# Patient Record
Sex: FEMALE | Race: WHITE | NOT HISPANIC OR LATINO | Employment: OTHER | URBAN - METROPOLITAN AREA
[De-identification: names, ages, dates, MRNs, and addresses within clinical notes are randomized per-mention and may not be internally consistent; named-entity substitution may affect disease eponyms.]

---

## 2017-02-15 RX ORDER — DULOXETIN HYDROCHLORIDE 30 MG/1
30 CAPSULE, DELAYED RELEASE ORAL DAILY
Qty: 90 CAPSULE | Refills: 2 | Status: SHIPPED | OUTPATIENT
Start: 2017-02-15 | End: 2017-08-22 | Stop reason: SDUPTHER

## 2017-02-15 RX ORDER — ISOSORBIDE MONONITRATE 30 MG/1
30 TABLET, EXTENDED RELEASE ORAL DAILY
Qty: 90 TABLET | Refills: 2 | Status: SHIPPED | OUTPATIENT
Start: 2017-02-15 | End: 2017-08-22 | Stop reason: SDUPTHER

## 2017-03-03 ENCOUNTER — OFFICE VISIT (OUTPATIENT)
Dept: INTERNAL MEDICINE | Facility: CLINIC | Age: 77
End: 2017-03-03

## 2017-03-03 ENCOUNTER — HOSPITAL ENCOUNTER (OUTPATIENT)
Dept: GENERAL RADIOLOGY | Facility: HOSPITAL | Age: 77
Discharge: HOME | End: 2017-03-03
Admitting: NURSE PRACTITIONER

## 2017-03-03 VITALS
SYSTOLIC BLOOD PRESSURE: 120 MMHG | RESPIRATION RATE: 16 BRPM | OXYGEN SATURATION: 96 % | DIASTOLIC BLOOD PRESSURE: 70 MMHG | BODY MASS INDEX: 29.87 KG/M2 | WEIGHT: 174 LBS | HEART RATE: 76 BPM | TEMPERATURE: 97.6 F

## 2017-03-03 DIAGNOSIS — M27.0 TORUS PALATINUS: ICD-10-CM

## 2017-03-03 DIAGNOSIS — M25.562 ACUTE PAIN OF LEFT KNEE: Primary | ICD-10-CM

## 2017-03-03 PROCEDURE — 99213 OFFICE O/P EST LOW 20 MIN: CPT | Performed by: NURSE PRACTITIONER

## 2017-03-03 PROCEDURE — 73562 X-RAY EXAM OF KNEE 3: CPT

## 2017-03-03 NOTE — PROGRESS NOTES
Vitals:    03/03/17 0959   BP: 120/70   Pulse: 76   Resp: 16   Temp: 97.6 °F (36.4 °C)   SpO2: 96%     Last 2 weights    03/03/17  0959   Weight: 174 lb (78.9 kg)     Social History   Substance Use Topics   • Smoking status: Former Smoker   • Smokeless tobacco: Not on file   • Alcohol use No       Subjective     HPI  Pt presents to office today with left knee pain for the past few weeks. She denies any trauma or acute injury. She states she just noticed it starting to hurt, especially when she exercised on her stationary bike. Some day she has to limp but is still able to bear weight on it. Pain is primarily located right under the knee cap. She has not been taking anything for pain relief at this time.     Also pts roof of mouth has been hurting for the past couple of days. She is unsure what happened. She does see a dentist regularly.     The following portions of the patient's history were reviewed and updated as appropriate: allergies, current medications, past medical history, past social history and problem list.    Review of Systems   Constitutional: Negative.    HENT:        Roof of mouth soreness   Respiratory: Negative.    Cardiovascular: Negative.    Musculoskeletal:        Left knee pain         Objective     Physical Exam   Constitutional: She is oriented to person, place, and time. She appears well-developed and well-nourished.   HENT:   Head: Normocephalic and atraumatic.   Torus palatinus noted that is slightly inflamed. No signs of infections noted   Neck: Normal range of motion.   Cardiovascular: Normal rate, regular rhythm and normal heart sounds.    Pulmonary/Chest: Effort normal and breath sounds normal.   Musculoskeletal: Normal range of motion.        Left knee: Tenderness found.        Legs:  Full extension pt can feel pain below patella. When palpating pain is reproducible as well. No redness, swelling noted.   Neurological: She is alert and oriented to person, place, and time.   Nursing  note and vitals reviewed.      Assessment/Plan   Navi was seen today for knee pain and oral pain.    Diagnoses and all orders for this visit:    Acute pain of left knee  -     XR Knee 3 View Left    Torus palatinus           -x ray left knee  -likely pt ate something hot or something sharp his torus palatinus. If pt notices foul odor, increase in size, or abnormal taste in mouth she is to go to her dentist. In the meantime salt water gargles  -ibuprofen, rest, knee sleeve, avoid heavy lifting and strain on knee.  -cont home meds  -FU prn or if symptoms persist/worsen

## 2017-04-20 RX ORDER — ATORVASTATIN CALCIUM 10 MG/1
10 TABLET, FILM COATED ORAL DAILY
Qty: 30 TABLET | Refills: 6 | Status: SHIPPED | OUTPATIENT
Start: 2017-04-20 | End: 2017-10-26 | Stop reason: SDUPTHER

## 2017-04-20 RX ORDER — AMLODIPINE BESYLATE AND BENAZEPRIL HYDROCHLORIDE 2.5; 1 MG/1; MG/1
1 CAPSULE ORAL DAILY
Qty: 30 CAPSULE | Refills: 6 | Status: SHIPPED | OUTPATIENT
Start: 2017-04-20 | End: 2017-10-26 | Stop reason: SDUPTHER

## 2017-05-04 ENCOUNTER — OFFICE VISIT (OUTPATIENT)
Dept: INTERNAL MEDICINE | Facility: CLINIC | Age: 77
End: 2017-05-04

## 2017-05-04 VITALS
TEMPERATURE: 98.2 F | WEIGHT: 182 LBS | SYSTOLIC BLOOD PRESSURE: 156 MMHG | DIASTOLIC BLOOD PRESSURE: 79 MMHG | BODY MASS INDEX: 31.07 KG/M2 | HEIGHT: 64 IN | RESPIRATION RATE: 16 BRPM | OXYGEN SATURATION: 92 % | HEART RATE: 78 BPM

## 2017-05-04 DIAGNOSIS — I10 ESSENTIAL HYPERTENSION: ICD-10-CM

## 2017-05-04 DIAGNOSIS — E11.9 NON-INSULIN DEPENDENT TYPE 2 DIABETES MELLITUS (HCC): ICD-10-CM

## 2017-05-04 DIAGNOSIS — E78.49 OTHER HYPERLIPIDEMIA: ICD-10-CM

## 2017-05-04 DIAGNOSIS — Z00.00 MEDICARE ANNUAL WELLNESS VISIT, INITIAL: Primary | ICD-10-CM

## 2017-05-04 DIAGNOSIS — M17.12 PRIMARY OSTEOARTHRITIS OF LEFT KNEE: ICD-10-CM

## 2017-05-04 DIAGNOSIS — I25.10 ATHEROSCLEROSIS OF NATIVE CORONARY ARTERY OF NATIVE HEART WITHOUT ANGINA PECTORIS: ICD-10-CM

## 2017-05-04 PROBLEM — M17.9 OSTEOARTHRITIS OF KNEE: Status: ACTIVE | Noted: 2017-05-04

## 2017-05-04 PROCEDURE — G0438 PPPS, INITIAL VISIT: HCPCS | Performed by: INTERNAL MEDICINE

## 2017-05-04 PROCEDURE — 96160 PT-FOCUSED HLTH RISK ASSMT: CPT | Performed by: INTERNAL MEDICINE

## 2017-05-04 PROCEDURE — 99214 OFFICE O/P EST MOD 30 MIN: CPT | Performed by: INTERNAL MEDICINE

## 2017-05-04 RX ORDER — LISINOPRIL 10 MG/1
10 TABLET ORAL DAILY
Qty: 90 TABLET | Refills: 3 | Status: SHIPPED | OUTPATIENT
Start: 2017-05-04 | End: 2018-01-15

## 2017-05-05 LAB
ALBUMIN SERPL-MCNC: 4.2 G/DL (ref 3.5–5.2)
ALBUMIN/GLOB SERPL: 1.5 G/DL
ALP SERPL-CCNC: 117 U/L (ref 39–117)
ALT SERPL-CCNC: 19 U/L (ref 1–33)
APPEARANCE UR: CLEAR
AST SERPL-CCNC: 18 U/L (ref 1–32)
BACTERIA #/AREA URNS HPF: ABNORMAL /HPF
BASOPHILS # BLD AUTO: 0.07 10*3/MM3 (ref 0–0.2)
BASOPHILS NFR BLD AUTO: 0.7 % (ref 0–1.5)
BILIRUB SERPL-MCNC: 0.3 MG/DL (ref 0.1–1.2)
BILIRUB UR QL STRIP: NEGATIVE
BUN SERPL-MCNC: 22 MG/DL (ref 8–23)
BUN/CREAT SERPL: 19.8 (ref 7–25)
CALCIUM SERPL-MCNC: 10.2 MG/DL (ref 8.6–10.5)
CASTS URNS MICRO: ABNORMAL
CHLORIDE SERPL-SCNC: 96 MMOL/L (ref 98–107)
CO2 SERPL-SCNC: 26.3 MMOL/L (ref 22–29)
COLOR UR: YELLOW
CREAT SERPL-MCNC: 1.11 MG/DL (ref 0.57–1)
EOSINOPHIL # BLD AUTO: 0.23 10*3/MM3 (ref 0–0.7)
EOSINOPHIL NFR BLD AUTO: 2.2 % (ref 0.3–6.2)
EPI CELLS #/AREA URNS HPF: ABNORMAL /HPF
ERYTHROCYTE [DISTWIDTH] IN BLOOD BY AUTOMATED COUNT: 13.2 % (ref 11.7–13)
GLOBULIN SER CALC-MCNC: 2.8 GM/DL
GLUCOSE SERPL-MCNC: 103 MG/DL (ref 65–99)
GLUCOSE UR QL: NEGATIVE
HBA1C MFR BLD: 5.94 % (ref 4.8–5.6)
HCT VFR BLD AUTO: 42.8 % (ref 35.6–45.5)
HGB BLD-MCNC: 14.3 G/DL (ref 11.9–15.5)
HGB UR QL STRIP: NEGATIVE
IMM GRANULOCYTES # BLD: 0.03 10*3/MM3 (ref 0–0.03)
IMM GRANULOCYTES NFR BLD: 0.3 % (ref 0–0.5)
KETONES UR QL STRIP: NEGATIVE
LEUKOCYTE ESTERASE UR QL STRIP: (no result)
LYMPHOCYTES # BLD AUTO: 2.87 10*3/MM3 (ref 0.9–4.8)
LYMPHOCYTES NFR BLD AUTO: 27.2 % (ref 19.6–45.3)
MCH RBC QN AUTO: 31.5 PG (ref 26.9–32)
MCHC RBC AUTO-ENTMCNC: 33.4 G/DL (ref 32.4–36.3)
MCV RBC AUTO: 94.3 FL (ref 80.5–98.2)
MICROALBUMIN UR-MCNC: 176.5 UG/ML
MONOCYTES # BLD AUTO: 0.64 10*3/MM3 (ref 0.2–1.2)
MONOCYTES NFR BLD AUTO: 6.1 % (ref 5–12)
NEUTROPHILS # BLD AUTO: 6.72 10*3/MM3 (ref 1.9–8.1)
NEUTROPHILS NFR BLD AUTO: 63.5 % (ref 42.7–76)
NITRITE UR QL STRIP: NEGATIVE
PH UR STRIP: 5.5 [PH] (ref 5–8)
PLATELET # BLD AUTO: 271 10*3/MM3 (ref 140–500)
POTASSIUM SERPL-SCNC: 4.1 MMOL/L (ref 3.5–5.2)
PROT SERPL-MCNC: 7 G/DL (ref 6–8.5)
PROT UR QL STRIP: (no result)
RBC # BLD AUTO: 4.54 10*6/MM3 (ref 3.9–5.2)
RBC #/AREA URNS HPF: ABNORMAL /HPF
SODIUM SERPL-SCNC: 139 MMOL/L (ref 136–145)
SP GR UR: 1.03 (ref 1–1.03)
T4 FREE SERPL-MCNC: 1.03 NG/DL (ref 0.93–1.7)
TSH SERPL DL<=0.005 MIU/L-ACNC: 0.77 MIU/ML (ref 0.27–4.2)
UROBILINOGEN UR STRIP-MCNC: (no result) MG/DL
WBC # BLD AUTO: 10.56 10*3/MM3 (ref 4.5–10.7)
WBC #/AREA URNS HPF: ABNORMAL /HPF

## 2017-05-09 RX ORDER — METOPROLOL SUCCINATE 50 MG/1
50 TABLET, EXTENDED RELEASE ORAL DAILY
Qty: 30 TABLET | Refills: 6 | Status: SHIPPED | OUTPATIENT
Start: 2017-05-09 | End: 2017-10-13 | Stop reason: SDUPTHER

## 2017-05-23 ENCOUNTER — OFFICE VISIT (OUTPATIENT)
Dept: INTERNAL MEDICINE | Facility: CLINIC | Age: 77
End: 2017-05-23

## 2017-05-23 VITALS
HEIGHT: 64 IN | RESPIRATION RATE: 16 BRPM | TEMPERATURE: 98 F | DIASTOLIC BLOOD PRESSURE: 90 MMHG | SYSTOLIC BLOOD PRESSURE: 130 MMHG | BODY MASS INDEX: 29.71 KG/M2 | HEART RATE: 66 BPM | OXYGEN SATURATION: 90 % | WEIGHT: 174 LBS

## 2017-05-23 DIAGNOSIS — I10 ESSENTIAL HYPERTENSION: Primary | ICD-10-CM

## 2017-05-23 PROCEDURE — 99213 OFFICE O/P EST LOW 20 MIN: CPT | Performed by: NURSE PRACTITIONER

## 2017-05-23 RX ORDER — CHLORTHALIDONE 25 MG/1
25 TABLET ORAL DAILY
Qty: 30 TABLET | Refills: 2 | Status: SHIPPED | OUTPATIENT
Start: 2017-05-23 | End: 2017-08-15 | Stop reason: SDUPTHER

## 2017-05-23 RX ORDER — CHLORTHALIDONE 25 MG/1
25 TABLET ORAL DAILY
COMMUNITY
End: 2017-05-23 | Stop reason: SDUPTHER

## 2017-06-02 ENCOUNTER — OFFICE VISIT (OUTPATIENT)
Dept: ORTHOPEDIC SURGERY | Facility: CLINIC | Age: 77
End: 2017-06-02

## 2017-06-02 VITALS — HEIGHT: 64 IN | TEMPERATURE: 99.6 F | WEIGHT: 175 LBS | BODY MASS INDEX: 29.88 KG/M2

## 2017-06-02 DIAGNOSIS — M25.562 ACUTE PAIN OF LEFT KNEE: Primary | ICD-10-CM

## 2017-06-02 PROCEDURE — 20610 DRAIN/INJ JOINT/BURSA W/O US: CPT | Performed by: NURSE PRACTITIONER

## 2017-06-02 PROCEDURE — 99213 OFFICE O/P EST LOW 20 MIN: CPT | Performed by: NURSE PRACTITIONER

## 2017-06-02 RX ORDER — BUPIVACAINE HYDROCHLORIDE 2.5 MG/ML
2 INJECTION, SOLUTION INFILTRATION; PERINEURAL
Status: COMPLETED | OUTPATIENT
Start: 2017-06-02 | End: 2017-06-02

## 2017-06-02 RX ORDER — LIDOCAINE HYDROCHLORIDE 10 MG/ML
4 INJECTION, SOLUTION INFILTRATION; PERINEURAL
Status: COMPLETED | OUTPATIENT
Start: 2017-06-02 | End: 2017-06-02

## 2017-06-02 RX ORDER — METHYLPREDNISOLONE ACETATE 80 MG/ML
80 INJECTION, SUSPENSION INTRA-ARTICULAR; INTRALESIONAL; INTRAMUSCULAR; SOFT TISSUE
Status: COMPLETED | OUTPATIENT
Start: 2017-06-02 | End: 2017-06-02

## 2017-06-02 RX ADMIN — BUPIVACAINE HYDROCHLORIDE 2 ML: 2.5 INJECTION, SOLUTION INFILTRATION; PERINEURAL at 14:11

## 2017-06-02 RX ADMIN — LIDOCAINE HYDROCHLORIDE 4 ML: 10 INJECTION, SOLUTION INFILTRATION; PERINEURAL at 14:11

## 2017-06-02 RX ADMIN — METHYLPREDNISOLONE ACETATE 80 MG: 80 INJECTION, SUSPENSION INTRA-ARTICULAR; INTRALESIONAL; INTRAMUSCULAR; SOFT TISSUE at 14:11

## 2017-06-02 NOTE — PROGRESS NOTES
Patient: Navi Kinney  YOB: 1940 76 y.o. female  Medical Record Number: [id number]    Chief Complaints:   Chief Complaint   Patient presents with   • Left Knee - Pain       History of Present Illness:Navi Kinney is a 76 y.o. female who presents With complaints of left knee pain.  Patient reports that it started about 3 weeks ago.  She denies any specific injury.  She describes the pain as a moderate ache with swelling, worse with sitting standing, slightly better with rest.  She saw her primary care physician who sent her for x-rays and then referred her here.    Allergies: No Known Allergies    Medications:   Current Outpatient Prescriptions   Medication Sig Dispense Refill   • amLODIPine-benazepril (LOTREL 2.5-10) 2.5-10 MG per capsule Take 1 capsule by mouth Daily. 30 capsule 6   • aspirin 81 MG tablet Take  by mouth.     • Cyanocobalamin (VITAMIN B-12 PO) Take  by mouth.     • DULoxetine (CYMBALTA) 30 MG capsule Take 1 capsule by mouth Daily. 90 capsule 2   • isosorbide mononitrate (IMDUR) 30 MG 24 hr tablet Take 1 tablet by mouth Daily. 90 tablet 2   • lisinopril (PRINIVIL,ZESTRIL) 10 MG tablet Take 1 tablet by mouth Daily. 90 tablet 3   • metoprolol succinate XL (TOPROL-XL) 50 MG 24 hr tablet Take 1 tablet by mouth Daily. 30 tablet 6   • nitroglycerin (NITROSTAT) 0.4 MG SL tablet Place  under the tongue.     • atorvastatin (LIPITOR) 10 MG tablet Take 1 tablet by mouth Daily. 30 tablet 6   • carvedilol (COREG) 6.25 MG tablet Take  by mouth daily.     • chlorthalidone (HYGROTON) 25 MG tablet Take 1 tablet by mouth Daily. 30 tablet 2   • pioglitazone (ACTOS) 15 MG tablet Take  by mouth.       No current facility-administered medications for this visit.          The following portions of the patient's history were reviewed and updated as appropriate: allergies, current medications, past family history, past medical history, past social history, past surgical history and problem list.    Review  "of Systems:   A 14 point review of systems was performed. All systems negative except pertinent positives/negative listed in HPI above    Physical Exam:   Vitals:    06/02/17 1321   Temp: 99.6 °F (37.6 °C)   Weight: 175 lb (79.4 kg)   Height: 64\" (162.6 cm)       General: A and O x 3, ASA, NAD    SCLERA:    Normal    DENTITION:   Normal  Skin clear no unusual lesions noted  Left knee patient has trace amount of effusion noted with 115° flexion neutral in extension with a positive Jada negative Lockman calf is soft and nontender    Radiology:  Xrays previous x-rays were reviewed and show moderate arthritic changes.    Assessment/Plan:  Osteoarthritis left knee    We will proceed with left knee aspiration then injection, refer the patient to outpatient physical therapy, and we'll otherwise see her back as needed    Large Joint Arthrocentesis  Date/Time: 6/2/2017 2:11 PM  Consent given by: patient  Site marked: site marked  Timeout: Immediately prior to procedure a time out was called to verify the correct patient, procedure, equipment, support staff and site/side marked as required   Supporting Documentation  Indications: pain and joint swelling   Procedure Details  Location: knee - L knee  Preparation: Patient was prepped and draped in the usual sterile fashion  Needle size: 18 G  Approach: anterolateral  Medications administered: 4 mL lidocaine 1 %; 80 mg methylPREDNISolone acetate 80 MG/ML; 2 mL bupivacaine  Aspirate amount: 12 mL  Aspirate: clear          "

## 2017-08-15 RX ORDER — CHLORTHALIDONE 25 MG/1
25 TABLET ORAL DAILY
Qty: 30 TABLET | Refills: 2 | Status: SHIPPED | OUTPATIENT
Start: 2017-08-15 | End: 2017-11-27 | Stop reason: SDUPTHER

## 2017-08-22 RX ORDER — ISOSORBIDE MONONITRATE 30 MG/1
TABLET, EXTENDED RELEASE ORAL
Qty: 90 TABLET | Refills: 0 | Status: SHIPPED | OUTPATIENT
Start: 2017-08-22 | End: 2018-01-15 | Stop reason: SDUPTHER

## 2017-08-22 RX ORDER — DULOXETIN HYDROCHLORIDE 30 MG/1
CAPSULE, DELAYED RELEASE ORAL
Qty: 90 CAPSULE | Refills: 0 | Status: SHIPPED | OUTPATIENT
Start: 2017-08-22 | End: 2018-01-15 | Stop reason: SDUPTHER

## 2017-08-31 ENCOUNTER — OFFICE VISIT (OUTPATIENT)
Dept: INTERNAL MEDICINE | Facility: CLINIC | Age: 77
End: 2017-08-31

## 2017-08-31 VITALS
HEIGHT: 64 IN | DIASTOLIC BLOOD PRESSURE: 68 MMHG | BODY MASS INDEX: 30.05 KG/M2 | TEMPERATURE: 98.5 F | SYSTOLIC BLOOD PRESSURE: 110 MMHG | OXYGEN SATURATION: 92 % | HEART RATE: 78 BPM | WEIGHT: 176 LBS | RESPIRATION RATE: 16 BRPM

## 2017-08-31 DIAGNOSIS — R10.33 PERIUMBILICAL ABDOMINAL PAIN: ICD-10-CM

## 2017-08-31 DIAGNOSIS — R10.32 LEFT LOWER QUADRANT PAIN: Primary | ICD-10-CM

## 2017-08-31 PROCEDURE — 99213 OFFICE O/P EST LOW 20 MIN: CPT | Performed by: NURSE PRACTITIONER

## 2017-09-02 LAB
ALBUMIN SERPL-MCNC: 4.1 G/DL (ref 3.5–5.2)
ALBUMIN/GLOB SERPL: 1.6 G/DL
ALP SERPL-CCNC: 87 U/L (ref 39–117)
ALT SERPL-CCNC: 14 U/L (ref 1–33)
APPEARANCE UR: ABNORMAL
AST SERPL-CCNC: 12 U/L (ref 1–32)
BACTERIA #/AREA URNS HPF: ABNORMAL /HPF
BACTERIA UR CULT: NORMAL
BACTERIA UR CULT: NORMAL
BASOPHILS # BLD AUTO: 0.05 10*3/MM3 (ref 0–0.2)
BASOPHILS NFR BLD AUTO: 0.5 % (ref 0–1.5)
BILIRUB SERPL-MCNC: 0.6 MG/DL (ref 0.1–1.2)
BILIRUB UR QL STRIP: NEGATIVE
BUN SERPL-MCNC: 24 MG/DL (ref 8–23)
BUN/CREAT SERPL: 19.5 (ref 7–25)
CALCIUM SERPL-MCNC: 9.7 MG/DL (ref 8.6–10.5)
CASTS URNS MICRO: ABNORMAL
CASTS URNS QL MICRO: PRESENT /LPF
CHLORIDE SERPL-SCNC: 99 MMOL/L (ref 98–107)
CO2 SERPL-SCNC: 28.6 MMOL/L (ref 22–29)
COLOR UR: YELLOW
CREAT SERPL-MCNC: 1.23 MG/DL (ref 0.57–1)
EOSINOPHIL # BLD AUTO: 0.15 10*3/MM3 (ref 0–0.7)
EOSINOPHIL NFR BLD AUTO: 1.5 % (ref 0.3–6.2)
EPI CELLS #/AREA URNS HPF: >10 /HPF
ERYTHROCYTE [DISTWIDTH] IN BLOOD BY AUTOMATED COUNT: 13.3 % (ref 11.7–13)
GLOBULIN SER CALC-MCNC: 2.6 GM/DL
GLUCOSE SERPL-MCNC: 120 MG/DL (ref 65–99)
GLUCOSE UR QL: NEGATIVE
HCT VFR BLD AUTO: 40.5 % (ref 35.6–45.5)
HGB BLD-MCNC: 13.4 G/DL (ref 11.9–15.5)
HGB UR QL STRIP: NEGATIVE
IMM GRANULOCYTES # BLD: 0.05 10*3/MM3 (ref 0–0.03)
IMM GRANULOCYTES NFR BLD: 0.5 % (ref 0–0.5)
KETONES UR QL STRIP: NEGATIVE
LEUKOCYTE ESTERASE UR QL STRIP: ABNORMAL
LYMPHOCYTES # BLD AUTO: 2.02 10*3/MM3 (ref 0.9–4.8)
LYMPHOCYTES NFR BLD AUTO: 20.4 % (ref 19.6–45.3)
MCH RBC QN AUTO: 31.5 PG (ref 26.9–32)
MCHC RBC AUTO-ENTMCNC: 33.1 G/DL (ref 32.4–36.3)
MCV RBC AUTO: 95.3 FL (ref 80.5–98.2)
MICRO URNS: ABNORMAL
MONOCYTES # BLD AUTO: 0.77 10*3/MM3 (ref 0.2–1.2)
MONOCYTES NFR BLD AUTO: 7.8 % (ref 5–12)
MUCOUS THREADS URNS QL MICRO: PRESENT /HPF
NEUTROPHILS # BLD AUTO: 6.85 10*3/MM3 (ref 1.9–8.1)
NEUTROPHILS NFR BLD AUTO: 69.3 % (ref 42.7–76)
NITRITE UR QL STRIP: NEGATIVE
PH UR STRIP: 6 [PH] (ref 5–7.5)
PLATELET # BLD AUTO: 277 10*3/MM3 (ref 140–500)
POTASSIUM SERPL-SCNC: 4.1 MMOL/L (ref 3.5–5.2)
PROT SERPL-MCNC: 6.7 G/DL (ref 6–8.5)
PROT UR QL STRIP: NEGATIVE
RBC # BLD AUTO: 4.25 10*6/MM3 (ref 3.9–5.2)
RBC #/AREA URNS HPF: ABNORMAL /HPF
SODIUM SERPL-SCNC: 142 MMOL/L (ref 136–145)
SP GR UR: 1.02 (ref 1–1.03)
URINALYSIS REFLEX: ABNORMAL
UROBILINOGEN UR STRIP-MCNC: 0.2 MG/DL (ref 0.2–1)
WBC # BLD AUTO: 9.89 10*3/MM3 (ref 4.5–10.7)
WBC #/AREA URNS HPF: ABNORMAL /HPF

## 2017-10-13 ENCOUNTER — TRANSCRIBE ORDERS (OUTPATIENT)
Dept: ADMINISTRATIVE | Facility: HOSPITAL | Age: 77
End: 2017-10-13

## 2017-10-13 ENCOUNTER — OFFICE VISIT (OUTPATIENT)
Dept: INTERNAL MEDICINE | Facility: CLINIC | Age: 77
End: 2017-10-13

## 2017-10-13 VITALS
BODY MASS INDEX: 29.37 KG/M2 | HEIGHT: 64 IN | OXYGEN SATURATION: 95 % | DIASTOLIC BLOOD PRESSURE: 68 MMHG | RESPIRATION RATE: 16 BRPM | SYSTOLIC BLOOD PRESSURE: 116 MMHG | TEMPERATURE: 98 F | WEIGHT: 172 LBS | HEART RATE: 92 BPM

## 2017-10-13 DIAGNOSIS — Z12.31 VISIT FOR SCREENING MAMMOGRAM: Primary | ICD-10-CM

## 2017-10-13 DIAGNOSIS — K52.9 CHRONIC DIARRHEA: Primary | ICD-10-CM

## 2017-10-13 DIAGNOSIS — F43.9 STRESS AT HOME: ICD-10-CM

## 2017-10-13 PROCEDURE — 99214 OFFICE O/P EST MOD 30 MIN: CPT | Performed by: NURSE PRACTITIONER

## 2017-10-13 NOTE — PROGRESS NOTES
Vitals:    10/13/17 1232   BP: 116/68   Pulse: 92   Resp: 16   Temp: 98 °F (36.7 °C)   SpO2: 95%     Last 2 weights    10/13/17  1232   Weight: 172 lb (78 kg)     Social History   Substance Use Topics   • Smoking status: Former Smoker   • Smokeless tobacco: Not on file      Comment: quit 28 yrs ago   • Alcohol use No       Subjective     HPI  Pt presents to office today  With chronic diarrhea. After having baby 30 years starting having diarrhea that persisted, went to specialist which they didn't find anything. About 5 years after start of symptoms it stopped for years, and flared up again. Pt suffered through it as she didn't think anything could be done and recently the past 6 months it came back again. She has no control over it and often soils self. immodium works for her but she didn't know if it safe to take long term. Pt does no have gallbladder and had this removed 10 years ago. Past colonoscopy was normal per pt. Unable to find report. She would like to know if there is some she may take every day of if there is any testing that can be done. Denies weight loss, abdominal cramping, fever, night sweats. Denies recent travel or abnormal foods.    The following portions of the patient's history were reviewed and updated as appropriate: allergies, current medications, past medical history, past social history and problem list.    Review of Systems   Constitutional: Negative.    Respiratory: Negative.    Cardiovascular: Negative.    Gastrointestinal: Positive for diarrhea.       Objective     Physical Exam   Constitutional: She is oriented to person, place, and time. Vital signs are normal. She appears well-developed and well-nourished.   HENT:   Head: Normocephalic and atraumatic.   Neck: Normal range of motion.   Cardiovascular: Normal rate, regular rhythm and normal heart sounds.    Pulmonary/Chest: Effort normal and breath sounds normal.   Abdominal: Soft. Normal appearance. Bowel sounds are increased. There  is no tenderness.   Musculoskeletal: Normal range of motion.   Neurological: She is oriented to person, place, and time.   Nursing note and vitals reviewed.      Assessment/Plan   Navi was seen today for diarrhea.    Diagnoses and all orders for this visit:    Chronic diarrhea  -     CBC & Differential  -     Comprehensive Metabolic Panel  -     Amylase  -     Lipase  -     Stool Culture - Stool, Per Rectum  -     Clostridium Difficile EIA - Stool, Per Rectum    Stress at home  -     CBC & Differential  -     Comprehensive Metabolic Panel  -     Amylase  -     Lipase  -     Stool Culture - Stool, Per Rectum  -     Clostridium Difficile EIA - Stool, Per Rectum         -lab work today  -gave pt names of viberzi and humberto to see what issuance would cover.  -cont home meds  -FU in 2 weeks  -also advise pt to try metamucil, increase fiber, and probiotic  -spent more than 30 min in room with pt discussing hpi/pe/plan of care

## 2017-10-14 LAB
ALBUMIN SERPL-MCNC: 4.3 G/DL (ref 3.5–5.2)
ALBUMIN/GLOB SERPL: 1.7 G/DL
ALP SERPL-CCNC: 95 U/L (ref 39–117)
ALT SERPL-CCNC: 18 U/L (ref 1–33)
AMYLASE SERPL-CCNC: 76 U/L (ref 28–100)
AST SERPL-CCNC: 14 U/L (ref 1–32)
BASOPHILS # BLD AUTO: 0.06 10*3/MM3 (ref 0–0.2)
BASOPHILS NFR BLD AUTO: 0.7 % (ref 0–1.5)
BILIRUB SERPL-MCNC: 0.4 MG/DL (ref 0.1–1.2)
BUN SERPL-MCNC: 23 MG/DL (ref 8–23)
BUN/CREAT SERPL: 19.2 (ref 7–25)
CALCIUM SERPL-MCNC: 10.1 MG/DL (ref 8.6–10.5)
CHLORIDE SERPL-SCNC: 101 MMOL/L (ref 98–107)
CO2 SERPL-SCNC: 29.5 MMOL/L (ref 22–29)
CREAT SERPL-MCNC: 1.2 MG/DL (ref 0.57–1)
EOSINOPHIL # BLD AUTO: 0.11 10*3/MM3 (ref 0–0.7)
EOSINOPHIL NFR BLD AUTO: 1.3 % (ref 0.3–6.2)
ERYTHROCYTE [DISTWIDTH] IN BLOOD BY AUTOMATED COUNT: 12.9 % (ref 11.7–13)
GLOBULIN SER CALC-MCNC: 2.5 GM/DL
GLUCOSE SERPL-MCNC: 192 MG/DL (ref 65–99)
HCT VFR BLD AUTO: 43 % (ref 35.6–45.5)
HGB BLD-MCNC: 13.8 G/DL (ref 11.9–15.5)
IMM GRANULOCYTES # BLD: 0 10*3/MM3 (ref 0–0.03)
IMM GRANULOCYTES NFR BLD: 0 % (ref 0–0.5)
LIPASE SERPL-CCNC: 39 U/L (ref 13–60)
LYMPHOCYTES # BLD AUTO: 1.7 10*3/MM3 (ref 0.9–4.8)
LYMPHOCYTES NFR BLD AUTO: 19.6 % (ref 19.6–45.3)
MCH RBC QN AUTO: 31.6 PG (ref 26.9–32)
MCHC RBC AUTO-ENTMCNC: 32.1 G/DL (ref 32.4–36.3)
MCV RBC AUTO: 98.4 FL (ref 80.5–98.2)
MONOCYTES # BLD AUTO: 0.72 10*3/MM3 (ref 0.2–1.2)
MONOCYTES NFR BLD AUTO: 8.3 % (ref 5–12)
NEUTROPHILS # BLD AUTO: 6.08 10*3/MM3 (ref 1.9–8.1)
NEUTROPHILS NFR BLD AUTO: 70.1 % (ref 42.7–76)
PLATELET # BLD AUTO: 271 10*3/MM3 (ref 140–500)
POTASSIUM SERPL-SCNC: 4 MMOL/L (ref 3.5–5.2)
PROT SERPL-MCNC: 6.8 G/DL (ref 6–8.5)
RBC # BLD AUTO: 4.37 10*6/MM3 (ref 3.9–5.2)
SODIUM SERPL-SCNC: 142 MMOL/L (ref 136–145)
WBC # BLD AUTO: 8.67 10*3/MM3 (ref 4.5–10.7)

## 2017-10-16 RX ORDER — METOPROLOL SUCCINATE 50 MG/1
TABLET, EXTENDED RELEASE ORAL
Qty: 30 TABLET | Refills: 6 | Status: SHIPPED | OUTPATIENT
Start: 2017-10-16 | End: 2018-01-15 | Stop reason: SDUPTHER

## 2017-10-22 LAB
BACTERIA SPEC CULT: NORMAL
BACTERIA SPEC CULT: NORMAL
C DIFF TOX A+B STL QL IA: NEGATIVE
CAMPYLOBACTER STL CULT: NORMAL
E COLI SXT STL QL IA: NEGATIVE
SALM + SHIG STL CULT: NORMAL

## 2017-10-26 DIAGNOSIS — K52.9 CHRONIC DIARRHEA: Primary | ICD-10-CM

## 2017-10-26 DIAGNOSIS — K58.0 IRRITABLE BOWEL SYNDROME WITH DIARRHEA: ICD-10-CM

## 2017-10-26 RX ORDER — AMLODIPINE BESYLATE AND BENAZEPRIL HYDROCHLORIDE 2.5; 1 MG/1; MG/1
CAPSULE ORAL
Qty: 30 CAPSULE | Refills: 5 | Status: SHIPPED | OUTPATIENT
Start: 2017-10-26 | End: 2018-01-15 | Stop reason: SDUPTHER

## 2017-10-26 RX ORDER — ATORVASTATIN CALCIUM 10 MG/1
TABLET, FILM COATED ORAL
Qty: 30 TABLET | Refills: 5 | Status: SHIPPED | OUTPATIENT
Start: 2017-10-26 | End: 2018-01-15 | Stop reason: SDUPTHER

## 2017-11-07 ENCOUNTER — OFFICE VISIT (OUTPATIENT)
Dept: GASTROENTEROLOGY | Facility: CLINIC | Age: 77
End: 2017-11-07

## 2017-11-07 ENCOUNTER — OFFICE VISIT (OUTPATIENT)
Dept: INTERNAL MEDICINE | Facility: CLINIC | Age: 77
End: 2017-11-07

## 2017-11-07 ENCOUNTER — HOSPITAL ENCOUNTER (OUTPATIENT)
Dept: MAMMOGRAPHY | Facility: HOSPITAL | Age: 77
Discharge: HOME | End: 2017-11-07
Attending: INTERNAL MEDICINE | Admitting: INTERNAL MEDICINE

## 2017-11-07 VITALS
TEMPERATURE: 98.1 F | WEIGHT: 176.6 LBS | HEIGHT: 64 IN | SYSTOLIC BLOOD PRESSURE: 106 MMHG | DIASTOLIC BLOOD PRESSURE: 78 MMHG | BODY MASS INDEX: 30.15 KG/M2

## 2017-11-07 VITALS
TEMPERATURE: 98.4 F | DIASTOLIC BLOOD PRESSURE: 75 MMHG | HEART RATE: 81 BPM | WEIGHT: 176.8 LBS | HEIGHT: 64 IN | BODY MASS INDEX: 30.19 KG/M2 | SYSTOLIC BLOOD PRESSURE: 119 MMHG | OXYGEN SATURATION: 94 %

## 2017-11-07 DIAGNOSIS — E78.49 OTHER HYPERLIPIDEMIA: ICD-10-CM

## 2017-11-07 DIAGNOSIS — E11.9 NON-INSULIN DEPENDENT TYPE 2 DIABETES MELLITUS (HCC): ICD-10-CM

## 2017-11-07 DIAGNOSIS — I10 ESSENTIAL HYPERTENSION: Primary | ICD-10-CM

## 2017-11-07 DIAGNOSIS — K58.0 IRRITABLE BOWEL SYNDROME WITH DIARRHEA: Primary | ICD-10-CM

## 2017-11-07 DIAGNOSIS — N18.30 CHRONIC KIDNEY DISEASE, STAGE III (MODERATE) (HCC): ICD-10-CM

## 2017-11-07 DIAGNOSIS — K43.2 INCISIONAL HERNIA, WITHOUT OBSTRUCTION OR GANGRENE: ICD-10-CM

## 2017-11-07 DIAGNOSIS — F32.89 OTHER DEPRESSION: ICD-10-CM

## 2017-11-07 DIAGNOSIS — Z12.31 VISIT FOR SCREENING MAMMOGRAM: ICD-10-CM

## 2017-11-07 DIAGNOSIS — I25.10 ATHEROSCLEROSIS OF NATIVE CORONARY ARTERY OF NATIVE HEART WITHOUT ANGINA PECTORIS: ICD-10-CM

## 2017-11-07 PROCEDURE — G0202 SCR MAMMO BI INCL CAD: HCPCS

## 2017-11-07 PROCEDURE — 77063 BREAST TOMOSYNTHESIS BI: CPT

## 2017-11-07 PROCEDURE — G0008 ADMIN INFLUENZA VIRUS VAC: HCPCS | Performed by: INTERNAL MEDICINE

## 2017-11-07 PROCEDURE — 99214 OFFICE O/P EST MOD 30 MIN: CPT | Performed by: INTERNAL MEDICINE

## 2017-11-07 PROCEDURE — 90662 IIV NO PRSV INCREASED AG IM: CPT | Performed by: INTERNAL MEDICINE

## 2017-11-07 PROCEDURE — 99203 OFFICE O/P NEW LOW 30 MIN: CPT | Performed by: INTERNAL MEDICINE

## 2017-11-07 RX ORDER — DICYCLOMINE HCL 20 MG
20 TABLET ORAL
Qty: 90 TABLET | Refills: 5 | Status: SHIPPED | OUTPATIENT
Start: 2017-11-07 | End: 2017-12-07

## 2017-11-07 RX ORDER — LOPERAMIDE HYDROCHLORIDE 2 MG/1
2 CAPSULE ORAL 4 TIMES DAILY PRN
COMMUNITY
End: 2017-12-13

## 2017-11-07 NOTE — PROGRESS NOTES
Chief Complaint   Patient presents with   • Diarrhea     Subjective      HPI     Navi Kinney is a 76 y.o. female who presents for evaluation of diarrhea.  Symptoms have been present dating back at least 30 years.  She reports bowels open 2-3 times/day.  Her biggest issue is with fecal urgency.  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 She has used imodium a few times with very good results, but has been reluctant to use on a more regular basis.  She has had prior workup including colonoscopy in 2013.  She had recent stool testing which was normal.  She had cholecystectomy 10yrs ago.  No nocturnal sx.  No unintentional weight loss.      Past Medical History:   Diagnosis Date   • Arthritis    • Cancer    • Coronary artery disease    • Depression    • Diabetes mellitus    • Hyperlipidemia    • Hypertension    • Neuromuscular disorder        Current Outpatient Prescriptions:   •  amLODIPine-benazepril (LOTREL 2.5-10) 2.5-10 MG per capsule, TAKE ONE CAPSULE BY MOUTH EVERY DAY, Disp: 30 capsule, Rfl: 5  •  aspirin 81 MG tablet, Take  by mouth., Disp: , Rfl:   •  atorvastatin (LIPITOR) 10 MG tablet, TAKE ONE TABLET BY MOUTH EVERY DAY, Disp: 30 tablet, Rfl: 5  •  carvedilol (COREG) 6.25 MG tablet, Take  by mouth daily., Disp: , Rfl:   •  chlorthalidone (HYGROTON) 25 MG tablet, Take 1 tablet by mouth Daily., Disp: 30 tablet, Rfl: 2  •  Cyanocobalamin (VITAMIN B-12 PO), Take  by mouth., Disp: , Rfl:   •  DULoxetine (CYMBALTA) 30 MG capsule, TAKE ONE CAPSULE BY MOUTH EVERY DAY, Disp: 90 capsule, Rfl: 0  •  isosorbide mononitrate (IMDUR) 30 MG 24 hr tablet, TAKE ONE TABLET BY MOUTH EVERY DAY, Disp: 90 tablet, Rfl: 0  •  lisinopril (PRINIVIL,ZESTRIL) 10 MG tablet, Take 1 tablet by mouth Daily., Disp: 90 tablet, Rfl: 3  •  metoprolol succinate XL (TOPROL-XL) 50 MG 24 hr tablet, TAKE ONE TABLET BY MOUTH DAILY, Disp: 30 tablet, Rfl: 6  •  nitroglycerin (NITROSTAT) 0.4 MG SL tablet, Place  under the tongue.,  Disp: , Rfl:   •  pioglitazone (ACTOS) 15 MG tablet, Take  by mouth., Disp: , Rfl:   •  dicyclomine (BENTYL) 20 MG tablet, Take 1 tablet by mouth 4 (Four) Times a Day Before Meals & at Bedtime As Needed (cramping) for up to 30 days., Disp: 90 tablet, Rfl: 5  •  loperamide (IMODIUM) 2 MG capsule, Take 2 mg by mouth 4 (Four) Times a Day As Needed for Diarrhea., Disp: , Rfl:      No Known Allergies  Social History     Social History   • Marital status:      Spouse name: N/A   • Number of children: N/A   • Years of education: N/A     Occupational History   • Not on file.     Social History Main Topics   • Smoking status: Former Smoker   • Smokeless tobacco: Not on file      Comment: quit 28 yrs ago   • Alcohol use No   • Drug use: Defer   • Sexual activity: Defer     Other Topics Concern   • Not on file     Social History Narrative     Family History   Problem Relation Age of Onset   • Heart disease Mother    • Hypertension Mother      Review of Systems   Constitutional: Positive for fatigue.   Gastrointestinal: Positive for diarrhea.   All other systems reviewed and are negative.       Objective   Vitals:    11/07/17 1526   BP: 106/78   Temp: 98.1 °F (36.7 °C)     Physical Exam   Constitutional: She is oriented to person, place, and time. She appears well-developed and well-nourished.   HENT:   Head: Normocephalic and atraumatic.   Mouth/Throat: Oropharynx is clear and moist.   Eyes: EOM are normal. No scleral icterus.   Neck: Normal range of motion. Neck supple. No thyromegaly present.   Cardiovascular: Normal rate, regular rhythm and normal heart sounds.  Exam reveals no gallop and no friction rub.    No murmur heard.  Pulmonary/Chest: Effort normal and breath sounds normal. She has no wheezes. She has no rales. She exhibits no tenderness.   Abdominal: Soft. Bowel sounds are normal. She exhibits no distension. There is no tenderness. There is no rebound and no guarding. No hernia.   Musculoskeletal: Normal  range of motion. She exhibits no edema.   Lymphadenopathy:     She has no cervical adenopathy.   Neurological: She is alert and oriented to person, place, and time.   Skin: Skin is warm and dry.   Psychiatric: She has a normal mood and affect. Judgment and thought content normal.   Vitals reviewed.       Assessment/Plan   Assessment:     1. Irritable bowel syndrome with diarrhea      Plan:   Check fecal calprotectin and celiac panel  Trial of Bentyl prior to meals   Ok to use as needed imodium  If above measures not effective, will then given 2 week course of Xifaxin  May also consider testing C74 levels at some point to rule out bile acid diarrhea    Return in about 8 weeks (around 1/2/2018).        Musa Barajas M.D.  [facility]  [address] [place] [address]  Office: [phone]

## 2017-11-08 ENCOUNTER — TELEPHONE (OUTPATIENT)
Dept: GASTROENTEROLOGY | Facility: CLINIC | Age: 77
End: 2017-11-08

## 2017-11-08 LAB
ALBUMIN SERPL-MCNC: 4.2 G/DL (ref 3.5–4.8)
ALBUMIN/GLOB SERPL: 1.6 {RATIO} (ref 1.2–2.2)
ALP SERPL-CCNC: 96 IU/L (ref 39–117)
ALT SERPL-CCNC: 16 IU/L (ref 0–32)
APPEARANCE UR: CLEAR
AST SERPL-CCNC: 16 IU/L (ref 0–40)
BACTERIA #/AREA URNS HPF: NORMAL /HPF
BASOPHILS # BLD AUTO: 0.1 X10E3/UL (ref 0–0.2)
BASOPHILS NFR BLD AUTO: 1 %
BILIRUB SERPL-MCNC: 0.3 MG/DL (ref 0–1.2)
BILIRUB UR QL STRIP: NEGATIVE
BUN SERPL-MCNC: 25 MG/DL (ref 8–27)
BUN/CREAT SERPL: 24 (ref 12–28)
CALCIUM SERPL-MCNC: 9.5 MG/DL (ref 8.7–10.3)
CASTS URNS MICRO: NORMAL
CHLORIDE SERPL-SCNC: 101 MMOL/L (ref 96–106)
CHOLEST SERPL-MCNC: 171 MG/DL (ref 100–199)
CO2 SERPL-SCNC: 24 MMOL/L (ref 18–29)
COLOR UR: YELLOW
CREAT SERPL-MCNC: 1.06 MG/DL (ref 0.57–1)
EOSINOPHIL # BLD AUTO: 0.2 X10E3/UL (ref 0–0.4)
EOSINOPHIL NFR BLD AUTO: 1 %
EPI CELLS #/AREA URNS HPF: NORMAL /HPF
ERYTHROCYTE [DISTWIDTH] IN BLOOD BY AUTOMATED COUNT: 13.2 % (ref 12.3–15.4)
GFR SERPLBLD CREATININE-BSD FMLA CKD-EPI: 51 ML/MIN/1.73
GFR SERPLBLD CREATININE-BSD FMLA CKD-EPI: 59 ML/MIN/1.73
GLIADIN PEPTIDE IGA SER-ACNC: 4 UNITS (ref 0–19)
GLIADIN PEPTIDE IGG SER-ACNC: 4 UNITS (ref 0–19)
GLOBULIN SER CALC-MCNC: 2.6 G/DL (ref 1.5–4.5)
GLUCOSE SERPL-MCNC: 137 MG/DL (ref 65–99)
GLUCOSE UR QL: NEGATIVE
HBA1C MFR BLD: 6.3 % (ref 4.8–5.6)
HCT VFR BLD AUTO: 41.1 % (ref 34–46.6)
HDLC SERPL-MCNC: 45 MG/DL
HGB BLD-MCNC: 13.9 G/DL (ref 11.1–15.9)
HGB UR QL STRIP: NEGATIVE
IGA SERPL-MCNC: 139 MG/DL (ref 64–422)
IMM GRANULOCYTES # BLD: 0 X10E3/UL (ref 0–0.1)
IMM GRANULOCYTES NFR BLD: 0 %
KETONES UR QL STRIP: NEGATIVE
LDLC SERPL CALC-MCNC: 49 MG/DL (ref 0–99)
LDLC/HDLC SERPL: 1.1 RATIO UNITS (ref 0–3.2)
LEUKOCYTE ESTERASE UR QL STRIP: (no result)
LYMPHOCYTES # BLD AUTO: 2.2 X10E3/UL (ref 0.7–3.1)
LYMPHOCYTES NFR BLD AUTO: 20 %
MCH RBC QN AUTO: 31.2 PG (ref 26.6–33)
MCHC RBC AUTO-ENTMCNC: 33.8 G/DL (ref 31.5–35.7)
MCV RBC AUTO: 92 FL (ref 79–97)
MICROALBUMIN UR-MCNC: 42.3 UG/ML
MONOCYTES # BLD AUTO: 0.8 X10E3/UL (ref 0.1–0.9)
MONOCYTES NFR BLD AUTO: 7 %
NEUTROPHILS # BLD AUTO: 7.8 X10E3/UL (ref 1.4–7)
NEUTROPHILS NFR BLD AUTO: 71 %
NITRITE UR QL STRIP: NEGATIVE
PH UR STRIP: 6 [PH] (ref 5–8)
PLATELET # BLD AUTO: 278 X10E3/UL (ref 150–379)
POTASSIUM SERPL-SCNC: 3.9 MMOL/L (ref 3.5–5.2)
PROT SERPL-MCNC: 6.8 G/DL (ref 6–8.5)
PROT UR QL STRIP: NEGATIVE
RBC # BLD AUTO: 4.45 X10E6/UL (ref 3.77–5.28)
RBC #/AREA URNS HPF: NORMAL /HPF
SODIUM SERPL-SCNC: 143 MMOL/L (ref 134–144)
SP GR UR: 1.02 (ref 1–1.03)
T4 FREE SERPL-MCNC: 0.9 NG/DL (ref 0.82–1.77)
TRIGL SERPL-MCNC: 387 MG/DL (ref 0–149)
TSH SERPL DL<=0.005 MIU/L-ACNC: 1.47 UIU/ML (ref 0.45–4.5)
TTG IGA SER-ACNC: <2 U/ML (ref 0–3)
TTG IGG SER-ACNC: <2 U/ML (ref 0–5)
UROBILINOGEN UR STRIP-MCNC: (no result) MG/DL
VLDLC SERPL CALC-MCNC: 77 MG/DL (ref 5–40)
WBC # BLD AUTO: 11.1 X10E3/UL (ref 3.4–10.8)
WBC #/AREA URNS HPF: NORMAL /HPF

## 2017-11-08 NOTE — TELEPHONE ENCOUNTER
----- Message from Musa Barajas MD sent at 11/8/2017  7:37 AM EST -----  Please request colonoscopy report from [facility] circa 2013.  Thanks.

## 2017-11-08 NOTE — TELEPHONE ENCOUNTER
Called [facility] and requested C/S and path results from 2013 be faxed to [phone]. Records will be faxed.

## 2017-11-12 ENCOUNTER — RESULTS ENCOUNTER (OUTPATIENT)
Dept: GASTROENTEROLOGY | Facility: CLINIC | Age: 77
End: 2017-11-12

## 2017-11-12 DIAGNOSIS — K58.0 IRRITABLE BOWEL SYNDROME WITH DIARRHEA: ICD-10-CM

## 2017-11-15 LAB
CALPROTECTIN STL-MCNT: <16 UG/G (ref 0–120)
NTI FECAL TRPT (PP ORANGE): NORMAL
NTI O/P KIT: NORMAL

## 2017-11-17 RX ORDER — FENOFIBRATE 145 MG/1
145 TABLET, COATED ORAL DAILY
Qty: 30 TABLET | Refills: 5 | Status: SHIPPED | OUTPATIENT
Start: 2017-11-17 | End: 2018-05-02 | Stop reason: SDUPTHER

## 2017-11-19 NOTE — PROGRESS NOTES
Subjective   Navi Kinney is a 76 y.o. female.   She is here today for regular follow-up for hypertension hyperlipidemia native CAD non-insulin-dependent type 2 diabetes chronic kidney disease stage III depression incisional hernia  History of Present Illness   She is here today for regular follow-up for hypertension hyperlipidemia native CAD non-insulin-dependent diabetes type 2 along with chronic kidney disease stage III along with depression incisional hernia  The following portions of the patient's history were reviewed and updated as appropriate: allergies, current medications, past family history, past medical history, past social history, past surgical history and problem list.    Review of Systems   Gastrointestinal: Positive for abdominal distention (incisional hernia).   All other systems reviewed and are negative.      Objective   Physical Exam   Constitutional: She is oriented to person, place, and time. She appears well-developed and well-nourished. She is cooperative.   HENT:   Head: Normocephalic and atraumatic.   Right Ear: Hearing, tympanic membrane, external ear and ear canal normal.   Left Ear: Hearing, tympanic membrane, external ear and ear canal normal.   Nose: Nose normal.   Mouth/Throat: Uvula is midline, oropharynx is clear and moist and mucous membranes are normal.   Eyes: Conjunctivae, EOM and lids are normal. Pupils are equal, round, and reactive to light.   Neck: Phonation normal. Neck supple. Carotid bruit is not present.   Cardiovascular: Normal rate, regular rhythm and normal heart sounds.  Exam reveals no gallop and no friction rub.    No murmur heard.  Pulmonary/Chest: Effort normal and breath sounds normal. No respiratory distress.   Abdominal: Soft. Bowel sounds are normal. She exhibits no distension and no mass. There is no hepatosplenomegaly. There is no tenderness. There is no rebound and no guarding. A hernia (incisional hernia) is present.   Musculoskeletal: She exhibits no  edema.   Neurological: She is alert and oriented to person, place, and time. Coordination and gait normal.   Skin: Skin is warm and dry.   Psychiatric: She has a normal mood and affect. Her speech is normal and behavior is normal. Judgment and thought content normal.   Nursing note and vitals reviewed.      Assessment/Plan   Diagnoses and all orders for this visit:    Essential hypertension  -     Hemoglobin A1c  -     Lipid Panel With LDL / HDL Ratio  -     MicroAlbumin, Urine, Random - Urine, Clean Catch  -     Comprehensive Metabolic Panel  -     CBC & Differential  -     Urinalysis With Microscopic - Urine, Clean Catch  -     TSH  -     T4, Free    Other hyperlipidemia  -     Hemoglobin A1c  -     Lipid Panel With LDL / HDL Ratio  -     MicroAlbumin, Urine, Random - Urine, Clean Catch  -     Comprehensive Metabolic Panel  -     CBC & Differential  -     Urinalysis With Microscopic - Urine, Clean Catch  -     TSH  -     T4, Free    Atherosclerosis of native coronary artery of native heart without angina pectoris  -     Hemoglobin A1c  -     Lipid Panel With LDL / HDL Ratio  -     MicroAlbumin, Urine, Random - Urine, Clean Catch  -     Comprehensive Metabolic Panel  -     CBC & Differential  -     Urinalysis With Microscopic - Urine, Clean Catch  -     TSH  -     T4, Free    Non-insulin dependent type 2 diabetes mellitus  -     Hemoglobin A1c  -     Lipid Panel With LDL / HDL Ratio  -     MicroAlbumin, Urine, Random - Urine, Clean Catch  -     Comprehensive Metabolic Panel  -     CBC & Differential  -     Urinalysis With Microscopic - Urine, Clean Catch  -     TSH  -     T4, Free    Chronic kidney disease, stage III (moderate)  -     Hemoglobin A1c  -     Lipid Panel With LDL / HDL Ratio  -     MicroAlbumin, Urine, Random - Urine, Clean Catch  -     Comprehensive Metabolic Panel  -     CBC & Differential  -     Urinalysis With Microscopic - Urine, Clean Catch  -     TSH  -     T4, Free    Other depression  -      Hemoglobin A1c  -     Lipid Panel With LDL / HDL Ratio  -     MicroAlbumin, Urine, Random - Urine, Clean Catch  -     Comprehensive Metabolic Panel  -     CBC & Differential  -     Urinalysis With Microscopic - Urine, Clean Catch  -     TSH  -     T4, Free    Incisional hernia, without obstruction or gangrene  -     Ambulatory Referral to General Surgery    Other orders  -     Flu Vaccine High Dose PF 65YR+  -     Microscopic Examination      Hypertension well-controlled on current medication including Lotrel and other medications  Chronic kidney disease stage III follow closely with nephrology  Depression stable on current medication  Incisional hernia referral general surgery for possible repair  Non-insulin-dependent type 2 diabetes follow hemoglobin A 1C with yearly of multiple visits and low-carb diet  Native CAD no evidence of chest pain or anginal equivalence but she is female and she is diabetic so look for any clues or symptoms  Hyperlipidemia keep LDL less than 70 with proper diet exercise medication and she is tolerating Lipitor we will look for CK and liver enzymes and no evidence of muscle aches

## 2017-11-27 RX ORDER — CHLORTHALIDONE 25 MG/1
25 TABLET ORAL DAILY
Qty: 30 TABLET | Refills: 5 | Status: SHIPPED | OUTPATIENT
Start: 2017-11-27 | End: 2018-07-05 | Stop reason: SDUPTHER

## 2017-11-27 RX ORDER — CHLORTHALIDONE 25 MG/1
25 TABLET ORAL DAILY
Qty: 30 TABLET | Refills: 5 | Status: SHIPPED | OUTPATIENT
Start: 2017-11-27 | End: 2017-11-27 | Stop reason: SDUPTHER

## 2017-12-06 ENCOUNTER — TELEPHONE (OUTPATIENT)
Dept: GASTROENTEROLOGY | Facility: CLINIC | Age: 77
End: 2017-12-06

## 2017-12-06 NOTE — TELEPHONE ENCOUNTER
Called pt and advised that per Dr Barajas: her celiac panel was negative and her fecal calprotectin stool test was normal. Pt verb understanding and states the medication Dr Barajas started on her seems to be helping.

## 2017-12-13 ENCOUNTER — PREP FOR SURGERY (OUTPATIENT)
Dept: OTHER | Facility: HOSPITAL | Age: 77
End: 2017-12-13

## 2017-12-13 ENCOUNTER — OFFICE VISIT (OUTPATIENT)
Dept: SURGERY | Facility: CLINIC | Age: 77
End: 2017-12-13

## 2017-12-13 VITALS — HEART RATE: 63 BPM | BODY MASS INDEX: 29.88 KG/M2 | OXYGEN SATURATION: 96 % | WEIGHT: 175 LBS | HEIGHT: 64 IN

## 2017-12-13 DIAGNOSIS — K43.2 RECURRENT INCISIONAL HERNIA: Primary | ICD-10-CM

## 2017-12-13 DIAGNOSIS — E11.9 NON-INSULIN DEPENDENT TYPE 2 DIABETES MELLITUS (HCC): ICD-10-CM

## 2017-12-13 DIAGNOSIS — I25.10 ATHEROSCLEROSIS OF NATIVE CORONARY ARTERY OF NATIVE HEART WITHOUT ANGINA PECTORIS: Primary | ICD-10-CM

## 2017-12-13 DIAGNOSIS — R06.09 DYSPNEA ON EXERTION: ICD-10-CM

## 2017-12-13 DIAGNOSIS — I10 ESSENTIAL HYPERTENSION: ICD-10-CM

## 2017-12-13 DIAGNOSIS — N18.30 CHRONIC KIDNEY DISEASE, STAGE III (MODERATE) (HCC): ICD-10-CM

## 2017-12-13 DIAGNOSIS — K43.2 INCISIONAL HERNIA, WITHOUT OBSTRUCTION OR GANGRENE: ICD-10-CM

## 2017-12-13 PROCEDURE — 99214 OFFICE O/P EST MOD 30 MIN: CPT | Performed by: SURGERY

## 2017-12-13 RX ORDER — CEFAZOLIN SODIUM 2 G/100ML
2 INJECTION, SOLUTION INTRAVENOUS ONCE
Status: CANCELLED | OUTPATIENT
Start: 2018-01-26 | End: 2018-01-26

## 2017-12-13 RX ORDER — SODIUM CHLORIDE 0.9 % (FLUSH) 0.9 %
1-10 SYRINGE (ML) INJECTION AS NEEDED
Status: CANCELLED | OUTPATIENT
Start: 2018-01-26

## 2017-12-13 RX ORDER — ACETAMINOPHEN 325 MG/1
650 TABLET ORAL ONCE
Status: CANCELLED | OUTPATIENT
Start: 2018-01-26 | End: 2018-01-26

## 2017-12-13 RX ORDER — DICYCLOMINE HCL 20 MG
20 TABLET ORAL EVERY 6 HOURS PRN
COMMUNITY
End: 2019-11-08

## 2017-12-13 NOTE — PROGRESS NOTES
SURGERY  Navi VESTA Kinney   1940 12/13/17    Chief Complaint:  Incisional hernia    HPI    Patient is a very nice 76 y.o. female who comes in for follow-up visit regarding a surgical hernia.  I actually saw her back in 2016, at which time the hernia was detected, but was not very symptomatic.  Despite that, I recommended that we go ahead and proceed, and that she get clearance from her cardiologist.  She did not follow-up thereafter    Her situation is already in that she has a hernia from a coronary artery bypass graft.  These are not extremely rare, but are unusual.  They pose a significant difficulty for repair, due to the rigidity of the structures surrounding it, which are the sternum in the ribs, and the morbidity associated with any attempt to envelop those ribs as an anchoring device.    She does say the area has gotten larger, and is not particularly painful.  It sticks out all the time except when she is lying down.  She doesn't feel that there is any times when her bowel function is impeded by the hernia, and doesn't feel that her hernia ever contains intestinal contents.  She does have chronic diarrhea, but this has improved since she saw Dr. Lee, and was started on Bentyl.    I looked back at her old records, and she's already had this repaired once, by Dr. Jaqueline grossman, when her cholecystectomy was carried out.  She does not recall that.  For confirmation, I signed into our old records system, found the operative note, and the report in fact does list an incisional hernia repair, which Dr. grossman did with figure-of-eight 0 Ethibonds.    Complicating factors surrounding our intended surgery including her chronic kidney disease stage III, diabetes, coronary artery disease (Dr. Nereida Henson), sleep apnea with noncompliance of use of apparatus, hypertension.  She is concerned about her cardiac status and will follow up with Dr. Henson prior to considering surgery.    Past Medical History:   Diagnosis  Date   • Anxiety    • Arthritis    • Carotid artery stenosis     mild stenosis bilaterally per US   • CKD (chronic kidney disease)     Stage III   • Coronary artery disease    • Depression    • Diabetes mellitus     Type II   • Hyperlipidemia    • Hypertension    • Neuromuscular disorder    • Sinus bradycardia    • Sleep apnea     does not uses CPAP or BiPAP     Past Surgical History:   Procedure Laterality Date   • CARDIAC CATHETERIZATION N/A 2010    Dr. Araiza; total RCA, occ SVG to RCA, subtotal small mild Cx, patent LIMA to LAD, MYA to OM; EF normal   • CATARACT EXTRACTION W/ INTRAOCULAR LENS IMPLANT  2012    Dr. Carlos   • CHOLECYSTECTOMY N/A 08/22/2008    Dr. Karlie Rabago   • COLONOSCOPY N/A 12/01/2013   • CORONARY ARTERY BYPASS GRAFT N/A 11/11/1997    Quadruple CABG-Dr. Henson   • INCISIONAL HERNIA REPAIR N/A 08/22/2008    Dr. Karlie Rabago   • TOTAL HIP ARTHROPLASTY Right 04/29/2015    Right anterior approach total hip arthroplasty-Dr. Albert Espino   • TOTAL HIP ARTHROPLASTY Left 12/22/2014    Left anterior approach total hip arthroplasty-Dr. Albert Espino     Family History   Problem Relation Age of Onset   • Heart disease Mother    • Hypertension Mother      Social History     Social History   • Marital status:      Spouse name: N/A   • Number of children: 3   • Years of education: N/A     Occupational History   •  Retired     Social History Main Topics   • Smoking status: Former Smoker     Types: Cigarettes     Quit date: 1989   • Smokeless tobacco: Never Used      Comment: quit 28 yrs ago   • Alcohol use No   • Drug use: Defer   • Sexual activity: Defer     Other Topics Concern   • Not on file     Social History Narrative     Occupation/Additional Social Hx: Retired, will have assistance from a tearing Genoveva, if surgery is needed.  If planning a trip to [place] in April with her sister and 2 other ladies, and is very intent upon being recuperated by that time      Current Outpatient Prescriptions:   •  " amLODIPine-benazepril (LOTREL 2.5-10) 2.5-10 MG per capsule, TAKE ONE CAPSULE BY MOUTH EVERY DAY, Disp: 30 capsule, Rfl: 5  •  aspirin 81 MG tablet, Take 81 mg by mouth Daily., Disp: , Rfl:   •  atorvastatin (LIPITOR) 10 MG tablet, TAKE ONE TABLET BY MOUTH EVERY DAY, Disp: 30 tablet, Rfl: 5  •  Cyanocobalamin (VITAMIN B-12 PO), Take 1 tablet by mouth Daily., Disp: , Rfl:   •  dicyclomine (BENTYL) 20 MG tablet, Take 20 mg by mouth Every 6 (Six) Hours., Disp: , Rfl:   •  DULoxetine (CYMBALTA) 30 MG capsule, TAKE ONE CAPSULE BY MOUTH EVERY DAY, Disp: 90 capsule, Rfl: 0  •  isosorbide mononitrate (IMDUR) 30 MG 24 hr tablet, TAKE ONE TABLET BY MOUTH EVERY DAY, Disp: 90 tablet, Rfl: 0  •  metoprolol succinate XL (TOPROL-XL) 50 MG 24 hr tablet, TAKE ONE TABLET BY MOUTH DAILY, Disp: 30 tablet, Rfl: 6  •  nitroglycerin (NITROSTAT) 0.4 MG SL tablet, Place 0.4 mg under the tongue Every 5 (Five) Minutes As Needed., Disp: , Rfl:   •  carvedilol (COREG) 6.25 MG tablet, Take 6.25 mg by mouth Daily., Disp: , Rfl:   •  chlorthalidone (HYGROTON) 25 MG tablet, Take 1 tablet by mouth Daily., Disp: 30 tablet, Rfl: 5  •  fenofibrate (TRICOR) 145 MG tablet, Take 1 tablet by mouth Daily., Disp: 30 tablet, Rfl: 5  •  lisinopril (PRINIVIL,ZESTRIL) 10 MG tablet, Take 1 tablet by mouth Daily., Disp: 90 tablet, Rfl: 3    No Known Allergies    Review of Systems   HENT: Positive for postnasal drip.    Gastrointestinal: Positive for diarrhea.   Hematological: Bruises/bleeds easily.   All other systems reviewed and are negative.      Vitals:    12/13/17 1428   Pulse: 63   SpO2: 96%   Weight: 79.4 kg (175 lb)   Height: 162.6 cm (64\")       PHYSICAL EXAM:    Pulse 63  Ht 162.6 cm (64\")  Wt 79.4 kg (175 lb)  LMP  (LMP Unknown)  SpO2 96%  BMI 30.04 kg/m2  Body mass index is 30.04 kg/(m^2).    Constitutional: well developed, well nourished, Appears stated age  Eyes: sclera nonicteric, conjunctiva not injected or edematous  ENMT: Hearing intact, " trachea midline, thyroid without masses  CVS: RRR, no murmur, peripheral edema not present, inferior to the sternum which has a bicycle the nature at the inferior aspect, is a very protuberant mass, at least 8 cm in diameters, protruding at least 3 cm, thin overlying skin, with large sac, and tissue that somewhat difficult to reduce, fatty in nature, with mediastinal chest tube incisional scars inferior to that  Respiratory: CTA, normal respiratory effort   Gastrointestinal: no hepatosplenomegaly, abdomen soft, nontender, nondistended, abdominal hernia not identified  Genitourinary: inguinal hernia not identified  Musculoskeletal: gait normal, muscle mass normal  Skin: warm and dry, no rashes visible  Neurological: awake and alert, seems to have reasonable capacity for understanding for medical decision making  Psychiatric: appears to have reasonable judgement, pleasant  Lymphatics: no cervical adenopathy     Radiographic Findings: None, except chest x-ray with mild cardiomegaly, 2015    Lab Findings: Creatinine is 1.06, hemoglobin A1c 6.3, triglycerides 387, hemoglobin 13.9, platelets 278    Pamphlet reviewed: Hernia    IMPRESSION:  · Subxiphoid incisional hernia, recurrent, with defect probably at least 6 cm, with the surrounding structures being that of bone associated with the sternum and ribs area and this is not a situation in which a advancement flap will allow the fascia to come together such that a nonsynthetic mesh can be used.  In this location, a permanent synthetic mesh will be necessary  · Diabetes with elevated hemoglobin A1c at 6.3  · Sleep apnea with noncompliance  · Hypertension and hypertriglyceridemia, followed by Dr. Nereida Henson, on amlodipine, Lipitor, isosorbide, metoprolol, Nitrostat when necessary, lisinopril, chlorthalidone  · Reported chronic kidney disease stage III, with creatinine of 1.06  · Cymbalta use    PLAN:  · Open recurrent incisional hernia repair with mesh.  I discussed with  her the difficulty of this location, being the immediate proximity to the bones, and the need to use a permanent mesh.  I think is quite likely that she'll need to stay in the hospital overnight due to the location and the discomfort associated with mesh secured in this location.  I think an open repair is going to be in her best interest due to the size of the sac, and the potential for seroma here.  A drain will likely be necessary  · Follow-up with Dr. Henson prior to the surgery, for clearance from a cardiac standpoint.    Nereida Almodovar M.D.  12/13/17      Addendum  1/6/2018  Patient was seen by Dr. Nereida Henson in her office, and has been cleared for surgery with acceptable risk.  She did not recommend any medication changes.  2:58 PM

## 2018-01-15 ENCOUNTER — APPOINTMENT (OUTPATIENT)
Dept: PREADMISSION TESTING | Facility: HOSPITAL | Age: 78
End: 2018-01-15

## 2018-01-15 VITALS
HEART RATE: 67 BPM | WEIGHT: 174.2 LBS | SYSTOLIC BLOOD PRESSURE: 110 MMHG | OXYGEN SATURATION: 96 % | DIASTOLIC BLOOD PRESSURE: 63 MMHG | HEIGHT: 64 IN | RESPIRATION RATE: 16 BRPM | TEMPERATURE: 96.9 F | BODY MASS INDEX: 29.74 KG/M2

## 2018-01-15 LAB
ANION GAP SERPL CALCULATED.3IONS-SCNC: 12.3 MMOL/L
BUN BLD-MCNC: 36 MG/DL (ref 8–23)
BUN/CREAT SERPL: 28.6 (ref 7–25)
CALCIUM SPEC-SCNC: 9.4 MG/DL (ref 8.6–10.5)
CHLORIDE SERPL-SCNC: 100 MMOL/L (ref 98–107)
CO2 SERPL-SCNC: 24.7 MMOL/L (ref 22–29)
CREAT BLD-MCNC: 1.26 MG/DL (ref 0.57–1)
DEPRECATED RDW RBC AUTO: 44.3 FL (ref 37–54)
ERYTHROCYTE [DISTWIDTH] IN BLOOD BY AUTOMATED COUNT: 12.8 % (ref 11.7–13)
GFR SERPL CREATININE-BSD FRML MDRD: 41 ML/MIN/1.73
GLUCOSE BLD-MCNC: 118 MG/DL (ref 65–99)
HCT VFR BLD AUTO: 42.9 % (ref 35.6–45.5)
HGB BLD-MCNC: 13.9 G/DL (ref 11.9–15.5)
MCH RBC QN AUTO: 30.8 PG (ref 26.9–32)
MCHC RBC AUTO-ENTMCNC: 32.4 G/DL (ref 32.4–36.3)
MCV RBC AUTO: 94.9 FL (ref 80.5–98.2)
PLATELET # BLD AUTO: 301 10*3/MM3 (ref 140–500)
PMV BLD AUTO: 11.2 FL (ref 6–12)
POTASSIUM BLD-SCNC: 4.1 MMOL/L (ref 3.5–5.2)
RBC # BLD AUTO: 4.52 10*6/MM3 (ref 3.9–5.2)
SODIUM BLD-SCNC: 137 MMOL/L (ref 136–145)
WBC NRBC COR # BLD: 8.53 10*3/MM3 (ref 4.5–10.7)

## 2018-01-15 PROCEDURE — 85027 COMPLETE CBC AUTOMATED: CPT | Performed by: SURGERY

## 2018-01-15 PROCEDURE — 36415 COLL VENOUS BLD VENIPUNCTURE: CPT

## 2018-01-15 PROCEDURE — 80048 BASIC METABOLIC PNL TOTAL CA: CPT | Performed by: SURGERY

## 2018-01-15 RX ORDER — ISOSORBIDE DINITRATE 30 MG/1
30 TABLET ORAL NIGHTLY
Status: ON HOLD | COMMUNITY
End: 2018-01-26

## 2018-01-15 RX ORDER — DULOXETIN HYDROCHLORIDE 30 MG/1
30 CAPSULE, DELAYED RELEASE ORAL NIGHTLY
COMMUNITY
End: 2018-02-23 | Stop reason: SDUPTHER

## 2018-01-15 RX ORDER — AMLODIPINE BESYLATE 2.5 MG/1
2.5 TABLET ORAL NIGHTLY
Status: ON HOLD | COMMUNITY
End: 2018-01-26

## 2018-01-15 RX ORDER — ATORVASTATIN CALCIUM 10 MG/1
10 TABLET, FILM COATED ORAL NIGHTLY
COMMUNITY
End: 2018-03-05 | Stop reason: ALTCHOICE

## 2018-01-15 RX ORDER — METOPROLOL TARTRATE 50 MG/1
50 TABLET, FILM COATED ORAL NIGHTLY
Status: ON HOLD | COMMUNITY
End: 2018-01-26

## 2018-01-15 NOTE — DISCHARGE INSTRUCTIONS
Take the following medications the morning of surgery with a small sip of water:  PLEASE CALL OFFICE SO WE KNOW WHAT MEDS YOU NEED TO TAKE THE MORNING OF SURGERY    ARRIVE AT HOSPITAL AT 05:30 AM ON January 26, 2018    General Instructions:  • Do not eat solid food after midnight the night before surgery.  • You may drink clear liquids day of surgery but must stop at least one hour before your hospital arrival time.  • It is beneficial for you to have a clear drink that contains carbohydrates the day of surgery.  We suggest a 12 to 20 ounce bottle of Gatorade or Powerade for non-diabetic patients or a 12 to 20 ounce bottle of G2 or Powerade Zero for diabetic patients. (Pediatric patients, are not advised to drink a 12 to 20 ounce carbohydrate drink)    Clear liquids are liquids you can see through.  Nothing red in color.     Plain water                               Sports drinks  Sodas                                   Gelatin (Jell-O)  Fruit juices without pulp such as white grape juice and apple juice  Popsicles that contain no fruit or yogurt  Tea or coffee (no cream or milk added)  Gatorade / Powerade  G2 / Powerade Zero    • Infants may have breast milk up to four hours before surgery.  • Infants drinking formula may drink formula up to six hours before surgery.   • Patients who avoid smoking, chewing tobacco and alcohol for 4 weeks prior to surgery have a reduced risk of post-operative complications.  Quit smoking as many days before surgery as you can.  • Do not smoke, use chewing tobacco or drink alcohol the day of surgery.   • If applicable bring your C-PAP/ BI-PAP machine.  • Bring any papers given to you in the doctor’s office.  • Wear clean comfortable clothes and socks.  • Do not wear contact lenses or make-up.  Bring a case for your glasses.   • Bring crutches or walker if applicable.  • Remove all piercings.  Leave jewelry and any other valuables at home.  • Hair extensions with metal clips must be  removed prior to surgery.  • The Pre-Admission Testing nurse will instruct you to bring medications if unable to obtain an accurate list in Pre-Admission Testing.      Preventing a Surgical Site Infection:  • For 2 to 3 days before surgery, avoid shaving with a razor because the razor can irritate skin and make it easier to develop an infection.  • The night prior to surgery sleep in a clean bed with clean clothing.  Do not allow pets to sleep with you.  • Shower on the morning of surgery using a fresh bar of anti-bacterial soap (such as Dial) and clean washcloth.  Dry with a clean towel and dress in clean clothing.  • Ask your surgeon if you will be receiving antibiotics prior to surgery.  • Make sure you, your family, and all healthcare providers clean their hands with soap and water or an alcohol based hand  before caring for you or your wound.    Day of surgery:  Upon arrival, a Pre-op nurse and Anesthesiologist will review your health history, obtain vital signs, and answer questions you may have.  The only belongings needed at this time will be your home medications and if applicable your C-PAP/BI-PAP machine.  If you are staying overnight your family can leave the rest of your belongings in the car and bring them to your room later.  A Pre-op nurse will start an IV and you may receive medication in preparation for surgery, including something to help you relax.  Your family will be able to see you in the Pre-op area.  While you are in surgery your family should notify the waiting room  if they leave the waiting room area and provide a contact phone number.    Please be aware that surgery does come with discomfort.  We want to make every effort to control your discomfort so please discuss any uncontrolled symptoms with your nurse.   Your doctor will most likely have prescribed pain medications.      If you are going home after surgery you will receive individualized written care  instructions before being discharged.  A responsible adult must drive you to and from the hospital on the day of your surgery and stay with you for 24 hours.    If you are staying overnight following surgery, you will be transported to your hospital room following the recovery period.  [facility] has all private rooms.    If you have any questions please call Pre-Admission Testing at [phone].  Deductibles and co-payments are collected on the day of service. Please be prepared to pay the required co-pay, deductible or deposit on the day of service as defined by your plan.

## 2018-01-26 ENCOUNTER — HOSPITAL ENCOUNTER (OUTPATIENT)
Facility: HOSPITAL | Age: 78
Setting detail: OBSERVATION
Discharge: HOME | End: 2018-01-27
Attending: SURGERY | Admitting: SURGERY

## 2018-01-26 ENCOUNTER — ANESTHESIA (OUTPATIENT)
Dept: PERIOP | Facility: HOSPITAL | Age: 78
End: 2018-01-26

## 2018-01-26 ENCOUNTER — ANESTHESIA EVENT (OUTPATIENT)
Dept: PERIOP | Facility: HOSPITAL | Age: 78
End: 2018-01-26

## 2018-01-26 DIAGNOSIS — K43.2 RECURRENT INCISIONAL HERNIA: ICD-10-CM

## 2018-01-26 PROBLEM — K43.9 VENTRAL HERNIA WITHOUT OBSTRUCTION OR GANGRENE: Status: ACTIVE | Noted: 2018-01-26

## 2018-01-26 LAB
GLUCOSE BLDC GLUCOMTR-MCNC: 125 MG/DL (ref 70–130)
GLUCOSE BLDC GLUCOMTR-MCNC: 167 MG/DL (ref 70–130)
GLUCOSE BLDC GLUCOMTR-MCNC: 167 MG/DL (ref 70–130)
GLUCOSE BLDC GLUCOMTR-MCNC: 206 MG/DL (ref 70–130)
GLUCOSE BLDC GLUCOMTR-MCNC: 209 MG/DL (ref 70–130)

## 2018-01-26 PROCEDURE — 25010000003 CEFAZOLIN IN DEXTROSE 2-4 GM/100ML-% SOLUTION: Performed by: SURGERY

## 2018-01-26 PROCEDURE — 49568 PR IMPLANT MESH HERNIA REPAIR/DEBRIDEMENT CLOSURE: CPT | Performed by: SURGERY

## 2018-01-26 PROCEDURE — C1781 MESH (IMPLANTABLE): HCPCS | Performed by: SURGERY

## 2018-01-26 PROCEDURE — G0378 HOSPITAL OBSERVATION PER HR: HCPCS

## 2018-01-26 PROCEDURE — 82962 GLUCOSE BLOOD TEST: CPT

## 2018-01-26 PROCEDURE — 49565 PR REPAIR RECURR INCIS HERNIA,REDUC: CPT | Performed by: SURGERY

## 2018-01-26 PROCEDURE — 25010000002 ONDANSETRON PER 1 MG: Performed by: ANESTHESIOLOGY

## 2018-01-26 PROCEDURE — 25010000002 DEXAMETHASONE PER 1 MG: Performed by: ANESTHESIOLOGY

## 2018-01-26 PROCEDURE — 25010000002 PROPOFOL 10 MG/ML EMULSION: Performed by: ANESTHESIOLOGY

## 2018-01-26 PROCEDURE — 25010000002 FENTANYL CITRATE (PF) 100 MCG/2ML SOLUTION: Performed by: ANESTHESIOLOGY

## 2018-01-26 DEVICE — BARD MESH
Type: IMPLANTABLE DEVICE | Site: ABDOMEN | Status: FUNCTIONAL
Brand: BARD MESH

## 2018-01-26 RX ORDER — ISOSORBIDE MONONITRATE 30 MG/1
30 TABLET, EXTENDED RELEASE ORAL NIGHTLY
COMMUNITY
End: 2018-02-23 | Stop reason: SDUPTHER

## 2018-01-26 RX ORDER — ACETAMINOPHEN 325 MG/1
650 TABLET ORAL ONCE
Status: COMPLETED | OUTPATIENT
Start: 2018-01-26 | End: 2018-01-26

## 2018-01-26 RX ORDER — DICYCLOMINE HYDROCHLORIDE 10 MG/1
10 CAPSULE ORAL 4 TIMES DAILY PRN
Status: DISCONTINUED | OUTPATIENT
Start: 2018-01-26 | End: 2018-01-27 | Stop reason: HOSPADM

## 2018-01-26 RX ORDER — OXYCODONE AND ACETAMINOPHEN 7.5; 325 MG/1; MG/1
1 TABLET ORAL ONCE AS NEEDED
Status: DISCONTINUED | OUTPATIENT
Start: 2018-01-26 | End: 2018-01-27 | Stop reason: HOSPADM

## 2018-01-26 RX ORDER — DEXTROSE, SODIUM CHLORIDE, AND POTASSIUM CHLORIDE 5; .45; .15 G/100ML; G/100ML; G/100ML
100 INJECTION INTRAVENOUS CONTINUOUS
Status: DISCONTINUED | OUTPATIENT
Start: 2018-01-26 | End: 2018-01-27 | Stop reason: HOSPADM

## 2018-01-26 RX ORDER — ONDANSETRON 4 MG/1
4 TABLET, ORALLY DISINTEGRATING ORAL EVERY 6 HOURS PRN
Status: DISCONTINUED | OUTPATIENT
Start: 2018-01-26 | End: 2018-01-27 | Stop reason: HOSPADM

## 2018-01-26 RX ORDER — FENTANYL CITRATE 50 UG/ML
25 INJECTION, SOLUTION INTRAMUSCULAR; INTRAVENOUS
Status: DISCONTINUED | OUTPATIENT
Start: 2018-01-26 | End: 2018-01-26 | Stop reason: HOSPADM

## 2018-01-26 RX ORDER — DULOXETIN HYDROCHLORIDE 30 MG/1
30 CAPSULE, DELAYED RELEASE ORAL NIGHTLY
Status: DISCONTINUED | OUTPATIENT
Start: 2018-01-26 | End: 2018-01-27 | Stop reason: HOSPADM

## 2018-01-26 RX ORDER — BUPIVACAINE HYDROCHLORIDE AND EPINEPHRINE 5; 5 MG/ML; UG/ML
INJECTION, SOLUTION PERINEURAL AS NEEDED
Status: DISCONTINUED | OUTPATIENT
Start: 2018-01-26 | End: 2018-01-26 | Stop reason: HOSPADM

## 2018-01-26 RX ORDER — PROMETHAZINE HYDROCHLORIDE 25 MG/ML
12.5 INJECTION, SOLUTION INTRAMUSCULAR; INTRAVENOUS ONCE AS NEEDED
Status: DISCONTINUED | OUTPATIENT
Start: 2018-01-26 | End: 2018-01-26 | Stop reason: HOSPADM

## 2018-01-26 RX ORDER — METOPROLOL SUCCINATE 50 MG/1
50 TABLET, EXTENDED RELEASE ORAL NIGHTLY
Status: DISCONTINUED | OUTPATIENT
Start: 2018-01-26 | End: 2018-01-27 | Stop reason: HOSPADM

## 2018-01-26 RX ORDER — MAGNESIUM HYDROXIDE 1200 MG/15ML
LIQUID ORAL AS NEEDED
Status: DISCONTINUED | OUTPATIENT
Start: 2018-01-26 | End: 2018-01-26 | Stop reason: HOSPADM

## 2018-01-26 RX ORDER — PROMETHAZINE HYDROCHLORIDE 25 MG/1
12.5 TABLET ORAL ONCE AS NEEDED
Status: DISCONTINUED | OUTPATIENT
Start: 2018-01-26 | End: 2018-01-26 | Stop reason: HOSPADM

## 2018-01-26 RX ORDER — NITROGLYCERIN 0.4 MG/1
0.4 TABLET SUBLINGUAL
Status: DISCONTINUED | OUTPATIENT
Start: 2018-01-26 | End: 2018-01-27 | Stop reason: HOSPADM

## 2018-01-26 RX ORDER — ROCURONIUM BROMIDE 10 MG/ML
INJECTION, SOLUTION INTRAVENOUS AS NEEDED
Status: DISCONTINUED | OUTPATIENT
Start: 2018-01-26 | End: 2018-01-26 | Stop reason: SURG

## 2018-01-26 RX ORDER — KETAMINE HYDROCHLORIDE 100 MG/ML
INJECTION INTRAMUSCULAR; INTRAVENOUS AS NEEDED
Status: DISCONTINUED | OUTPATIENT
Start: 2018-01-26 | End: 2018-01-26 | Stop reason: SURG

## 2018-01-26 RX ORDER — ONDANSETRON 4 MG/1
4 TABLET, FILM COATED ORAL EVERY 6 HOURS PRN
Status: DISCONTINUED | OUTPATIENT
Start: 2018-01-26 | End: 2018-01-27 | Stop reason: HOSPADM

## 2018-01-26 RX ORDER — KETOROLAC TROMETHAMINE 30 MG/ML
15 INJECTION, SOLUTION INTRAMUSCULAR; INTRAVENOUS EVERY 6 HOURS PRN
Status: DISCONTINUED | OUTPATIENT
Start: 2018-01-26 | End: 2018-01-27 | Stop reason: HOSPADM

## 2018-01-26 RX ORDER — SODIUM CHLORIDE 0.9 % (FLUSH) 0.9 %
1-10 SYRINGE (ML) INJECTION AS NEEDED
Status: DISCONTINUED | OUTPATIENT
Start: 2018-01-26 | End: 2018-01-26 | Stop reason: HOSPADM

## 2018-01-26 RX ORDER — FENTANYL CITRATE 50 UG/ML
50 INJECTION, SOLUTION INTRAMUSCULAR; INTRAVENOUS
Status: DISCONTINUED | OUTPATIENT
Start: 2018-01-26 | End: 2018-01-26 | Stop reason: HOSPADM

## 2018-01-26 RX ORDER — AMLODIPINE BESYLATE AND BENAZEPRIL HYDROCHLORIDE 2.5; 1 MG/1; MG/1
1 CAPSULE ORAL NIGHTLY
COMMUNITY
End: 2018-05-02 | Stop reason: SDUPTHER

## 2018-01-26 RX ORDER — NALOXONE HCL 0.4 MG/ML
0.2 VIAL (ML) INJECTION AS NEEDED
Status: DISCONTINUED | OUTPATIENT
Start: 2018-01-26 | End: 2018-01-26 | Stop reason: HOSPADM

## 2018-01-26 RX ORDER — PROMETHAZINE HYDROCHLORIDE 25 MG/1
25 SUPPOSITORY RECTAL ONCE AS NEEDED
Status: DISCONTINUED | OUTPATIENT
Start: 2018-01-26 | End: 2018-01-26 | Stop reason: HOSPADM

## 2018-01-26 RX ORDER — LABETALOL HYDROCHLORIDE 5 MG/ML
5 INJECTION, SOLUTION INTRAVENOUS
Status: DISCONTINUED | OUTPATIENT
Start: 2018-01-26 | End: 2018-01-26 | Stop reason: HOSPADM

## 2018-01-26 RX ORDER — DIPHENHYDRAMINE HYDROCHLORIDE 50 MG/ML
12.5 INJECTION INTRAMUSCULAR; INTRAVENOUS
Status: DISCONTINUED | OUTPATIENT
Start: 2018-01-26 | End: 2018-01-26 | Stop reason: HOSPADM

## 2018-01-26 RX ORDER — CEFAZOLIN SODIUM 2 G/100ML
2 INJECTION, SOLUTION INTRAVENOUS ONCE
Status: COMPLETED | OUTPATIENT
Start: 2018-01-26 | End: 2018-01-26

## 2018-01-26 RX ORDER — DEXAMETHASONE SODIUM PHOSPHATE 10 MG/ML
INJECTION INTRAMUSCULAR; INTRAVENOUS AS NEEDED
Status: DISCONTINUED | OUTPATIENT
Start: 2018-01-26 | End: 2018-01-26 | Stop reason: SURG

## 2018-01-26 RX ORDER — PROPOFOL 10 MG/ML
VIAL (ML) INTRAVENOUS AS NEEDED
Status: DISCONTINUED | OUTPATIENT
Start: 2018-01-26 | End: 2018-01-26 | Stop reason: SURG

## 2018-01-26 RX ORDER — METOPROLOL SUCCINATE 50 MG/1
50 TABLET, EXTENDED RELEASE ORAL NIGHTLY
COMMUNITY
End: 2018-05-16 | Stop reason: SDUPTHER

## 2018-01-26 RX ORDER — ATORVASTATIN CALCIUM 10 MG/1
10 TABLET, FILM COATED ORAL NIGHTLY
Status: DISCONTINUED | OUTPATIENT
Start: 2018-01-26 | End: 2018-01-27 | Stop reason: HOSPADM

## 2018-01-26 RX ORDER — EPHEDRINE SULFATE 50 MG/ML
5 INJECTION, SOLUTION INTRAVENOUS ONCE AS NEEDED
Status: DISCONTINUED | OUTPATIENT
Start: 2018-01-26 | End: 2018-01-26 | Stop reason: HOSPADM

## 2018-01-26 RX ORDER — FENTANYL CITRATE 50 UG/ML
INJECTION, SOLUTION INTRAMUSCULAR; INTRAVENOUS AS NEEDED
Status: DISCONTINUED | OUTPATIENT
Start: 2018-01-26 | End: 2018-01-26 | Stop reason: SURG

## 2018-01-26 RX ORDER — CEFAZOLIN SODIUM 2 G/100ML
2 INJECTION, SOLUTION INTRAVENOUS EVERY 8 HOURS
Status: COMPLETED | OUTPATIENT
Start: 2018-01-26 | End: 2018-01-27

## 2018-01-26 RX ORDER — MIDAZOLAM HYDROCHLORIDE 1 MG/ML
2 INJECTION INTRAMUSCULAR; INTRAVENOUS
Status: DISCONTINUED | OUTPATIENT
Start: 2018-01-26 | End: 2018-01-26 | Stop reason: HOSPADM

## 2018-01-26 RX ORDER — ONDANSETRON 2 MG/ML
4 INJECTION INTRAMUSCULAR; INTRAVENOUS ONCE AS NEEDED
Status: DISCONTINUED | OUTPATIENT
Start: 2018-01-26 | End: 2018-01-26 | Stop reason: HOSPADM

## 2018-01-26 RX ORDER — NALOXONE HCL 0.4 MG/ML
0.1 VIAL (ML) INJECTION
Status: DISCONTINUED | OUTPATIENT
Start: 2018-01-26 | End: 2018-01-27 | Stop reason: HOSPADM

## 2018-01-26 RX ORDER — METOCLOPRAMIDE HYDROCHLORIDE 5 MG/ML
10 INJECTION INTRAMUSCULAR; INTRAVENOUS EVERY 6 HOURS PRN
Status: DISCONTINUED | OUTPATIENT
Start: 2018-01-26 | End: 2018-01-27 | Stop reason: HOSPADM

## 2018-01-26 RX ORDER — OXYCODONE AND ACETAMINOPHEN 10; 325 MG/1; MG/1
1 TABLET ORAL EVERY 4 HOURS PRN
Status: DISCONTINUED | OUTPATIENT
Start: 2018-01-26 | End: 2018-01-27 | Stop reason: HOSPADM

## 2018-01-26 RX ORDER — PROMETHAZINE HYDROCHLORIDE 25 MG/1
25 TABLET ORAL ONCE AS NEEDED
Status: DISCONTINUED | OUTPATIENT
Start: 2018-01-26 | End: 2018-01-26 | Stop reason: HOSPADM

## 2018-01-26 RX ORDER — OXYCODONE AND ACETAMINOPHEN 7.5; 325 MG/1; MG/1
1 TABLET ORAL ONCE AS NEEDED
Status: DISCONTINUED | OUTPATIENT
Start: 2018-01-26 | End: 2018-01-26 | Stop reason: HOSPADM

## 2018-01-26 RX ORDER — HYDROMORPHONE HCL 110MG/55ML
0.5 PATIENT CONTROLLED ANALGESIA SYRINGE INTRAVENOUS
Status: DISCONTINUED | OUTPATIENT
Start: 2018-01-26 | End: 2018-01-26 | Stop reason: HOSPADM

## 2018-01-26 RX ORDER — FAMOTIDINE 10 MG/ML
20 INJECTION, SOLUTION INTRAVENOUS ONCE
Status: COMPLETED | OUTPATIENT
Start: 2018-01-26 | End: 2018-01-26

## 2018-01-26 RX ORDER — ONDANSETRON 2 MG/ML
INJECTION INTRAMUSCULAR; INTRAVENOUS AS NEEDED
Status: DISCONTINUED | OUTPATIENT
Start: 2018-01-26 | End: 2018-01-26 | Stop reason: SURG

## 2018-01-26 RX ORDER — HYDROMORPHONE HYDROCHLORIDE 1 MG/ML
0.5 INJECTION, SOLUTION INTRAMUSCULAR; INTRAVENOUS; SUBCUTANEOUS
Status: DISCONTINUED | OUTPATIENT
Start: 2018-01-26 | End: 2018-01-27 | Stop reason: HOSPADM

## 2018-01-26 RX ORDER — ISOSORBIDE MONONITRATE 30 MG/1
30 TABLET, EXTENDED RELEASE ORAL NIGHTLY
Status: DISCONTINUED | OUTPATIENT
Start: 2018-01-26 | End: 2018-01-27 | Stop reason: HOSPADM

## 2018-01-26 RX ORDER — LIDOCAINE HYDROCHLORIDE 10 MG/ML
0.5 INJECTION, SOLUTION EPIDURAL; INFILTRATION; INTRACAUDAL; PERINEURAL ONCE AS NEEDED
Status: DISCONTINUED | OUTPATIENT
Start: 2018-01-26 | End: 2018-01-26 | Stop reason: HOSPADM

## 2018-01-26 RX ORDER — HYDRALAZINE HYDROCHLORIDE 20 MG/ML
5 INJECTION INTRAMUSCULAR; INTRAVENOUS
Status: DISCONTINUED | OUTPATIENT
Start: 2018-01-26 | End: 2018-01-26 | Stop reason: HOSPADM

## 2018-01-26 RX ORDER — OXYCODONE HYDROCHLORIDE AND ACETAMINOPHEN 5; 325 MG/1; MG/1
TABLET ORAL
Qty: 20 TABLET | Refills: 0 | Status: SHIPPED | OUTPATIENT
Start: 2018-01-26 | End: 2018-02-05

## 2018-01-26 RX ORDER — ONDANSETRON 4 MG/1
4 TABLET, FILM COATED ORAL EVERY 8 HOURS PRN
Qty: 20 TABLET | Refills: 0 | Status: SHIPPED | OUTPATIENT
Start: 2018-01-26 | End: 2018-02-05

## 2018-01-26 RX ORDER — MIDAZOLAM HYDROCHLORIDE 1 MG/ML
1 INJECTION INTRAMUSCULAR; INTRAVENOUS
Status: DISCONTINUED | OUTPATIENT
Start: 2018-01-26 | End: 2018-01-26 | Stop reason: HOSPADM

## 2018-01-26 RX ORDER — SODIUM CHLORIDE, SODIUM LACTATE, POTASSIUM CHLORIDE, CALCIUM CHLORIDE 600; 310; 30; 20 MG/100ML; MG/100ML; MG/100ML; MG/100ML
9 INJECTION, SOLUTION INTRAVENOUS CONTINUOUS
Status: DISCONTINUED | OUTPATIENT
Start: 2018-01-26 | End: 2018-01-26

## 2018-01-26 RX ORDER — FLUMAZENIL 0.1 MG/ML
0.2 INJECTION INTRAVENOUS AS NEEDED
Status: DISCONTINUED | OUTPATIENT
Start: 2018-01-26 | End: 2018-01-26 | Stop reason: HOSPADM

## 2018-01-26 RX ORDER — CHLORTHALIDONE 25 MG/1
25 TABLET ORAL NIGHTLY
Status: DISCONTINUED | OUTPATIENT
Start: 2018-01-26 | End: 2018-01-27 | Stop reason: HOSPADM

## 2018-01-26 RX ORDER — ONDANSETRON 2 MG/ML
4 INJECTION INTRAMUSCULAR; INTRAVENOUS EVERY 6 HOURS PRN
Status: DISCONTINUED | OUTPATIENT
Start: 2018-01-26 | End: 2018-01-27 | Stop reason: HOSPADM

## 2018-01-26 RX ORDER — HYDROCODONE BITARTRATE AND ACETAMINOPHEN 7.5; 325 MG/1; MG/1
1 TABLET ORAL ONCE AS NEEDED
Status: DISCONTINUED | OUTPATIENT
Start: 2018-01-26 | End: 2018-01-26 | Stop reason: HOSPADM

## 2018-01-26 RX ADMIN — ISOSORBIDE MONONITRATE 30 MG: 30 TABLET ORAL at 20:53

## 2018-01-26 RX ADMIN — FENTANYL CITRATE 50 MCG: 50 INJECTION INTRAMUSCULAR; INTRAVENOUS at 07:49

## 2018-01-26 RX ADMIN — DULOXETINE HYDROCHLORIDE 30 MG: 30 CAPSULE, DELAYED RELEASE ORAL at 20:53

## 2018-01-26 RX ADMIN — KETAMINE HYDROCHLORIDE 20 MG: 100 INJECTION, SOLUTION INTRAMUSCULAR; INTRAVENOUS at 08:16

## 2018-01-26 RX ADMIN — SODIUM CHLORIDE, POTASSIUM CHLORIDE, SODIUM LACTATE AND CALCIUM CHLORIDE 9 ML/HR: 600; 310; 30; 20 INJECTION, SOLUTION INTRAVENOUS at 06:30

## 2018-01-26 RX ADMIN — FAMOTIDINE 20 MG: 10 INJECTION, SOLUTION INTRAVENOUS at 07:19

## 2018-01-26 RX ADMIN — CEFAZOLIN SODIUM 2 G: 2 INJECTION, SOLUTION INTRAVENOUS at 07:41

## 2018-01-26 RX ADMIN — CEFAZOLIN SODIUM 2 G: 2 INJECTION, SOLUTION INTRAVENOUS at 15:59

## 2018-01-26 RX ADMIN — METOPROLOL SUCCINATE 50 MG: 50 TABLET, FILM COATED, EXTENDED RELEASE ORAL at 20:53

## 2018-01-26 RX ADMIN — ROCURONIUM BROMIDE 50 MG: 10 INJECTION INTRAVENOUS at 07:27

## 2018-01-26 RX ADMIN — PROPOFOL 135 MG: 10 INJECTION, EMULSION INTRAVENOUS at 07:27

## 2018-01-26 RX ADMIN — ONDANSETRON 4 MG: 2 INJECTION INTRAMUSCULAR; INTRAVENOUS at 08:54

## 2018-01-26 RX ADMIN — ATORVASTATIN CALCIUM 10 MG: 10 TABLET, FILM COATED ORAL at 20:53

## 2018-01-26 RX ADMIN — SUGAMMADEX 313 MG: 100 INJECTION, SOLUTION INTRAVENOUS at 09:01

## 2018-01-26 RX ADMIN — CHLORTHALIDONE 25 MG: 25 TABLET ORAL at 20:53

## 2018-01-26 RX ADMIN — DEXAMETHASONE SODIUM PHOSPHATE 8 MG: 10 INJECTION INTRAMUSCULAR; INTRAVENOUS at 07:51

## 2018-01-26 RX ADMIN — ACETAMINOPHEN 650 MG: 325 TABLET ORAL at 06:31

## 2018-01-26 RX ADMIN — DICYCLOMINE HYDROCHLORIDE 10 MG: 10 CAPSULE ORAL at 22:01

## 2018-01-26 RX ADMIN — POTASSIUM CHLORIDE, DEXTROSE MONOHYDRATE AND SODIUM CHLORIDE 100 ML/HR: 150; 5; 450 INJECTION, SOLUTION INTRAVENOUS at 15:59

## 2018-01-26 RX ADMIN — FENTANYL CITRATE 100 MCG: 50 INJECTION INTRAMUSCULAR; INTRAVENOUS at 07:27

## 2018-01-26 RX ADMIN — DAKIN'S SOLUTION 0.125% (QUARTER STRENGTH) 946 ML: 0.12 SOLUTION at 16:32

## 2018-01-26 RX ADMIN — CEFAZOLIN SODIUM 2 G: 2 INJECTION, SOLUTION INTRAVENOUS at 23:29

## 2018-01-26 NOTE — H&P
Chief Complaint:  Incisional hernia     HPI    Patient here for her hernia repair, being recurrent and subxyphoid.     Patient is a very nice 76 y.o. female who comes in for follow-up visit regarding a surgical hernia.  I actually saw her back in 2016, at which time the hernia was detected, but was not very symptomatic.  Despite that, I recommended that we go ahead and proceed, and that she get clearance from her cardiologist.  She did not follow-up thereafter     Her situation is already in that she has a hernia from a coronary artery bypass graft.  These are not extremely rare, but are unusual.  They pose a significant difficulty for repair, due to the rigidity of the structures surrounding it, which are the sternum in the ribs, and the morbidity associated with any attempt to envelop those ribs as an anchoring device.     She does say the area has gotten larger, and is not particularly painful.  It sticks out all the time except when she is lying down.  She doesn't feel that there is any times when her bowel function is impeded by the hernia, and doesn't feel that her hernia ever contains intestinal contents.  She does have chronic diarrhea, but this has improved since she saw Dr. Lee, and was started on Bentyl.     I looked back at her old records, and she's already had this repaired once, by Dr. Jaqueline grossman, when her cholecystectomy was carried out.  She does not recall that.  For confirmation, I signed into our old records system, found the operative note, and the report in fact does list an incisional hernia repair, which Dr. grossman did with figure-of-eight 0 Ethibonds.     Complicating factors surrounding our intended surgery including her chronic kidney disease stage III, diabetes, coronary artery disease (Dr. Nereida Henson), sleep apnea with noncompliance of use of apparatus, hypertension.  She is concerned about her cardiac status and will follow up with Dr. Henson prior to considering surgery.       Medical History         Past Medical History:   Diagnosis Date   • Anxiety     • Arthritis     • Carotid artery stenosis       mild stenosis bilaterally per US   • CKD (chronic kidney disease)       Stage III   • Coronary artery disease     • Depression     • Diabetes mellitus       Type II   • Hyperlipidemia     • Hypertension     • Neuromuscular disorder     • Sinus bradycardia     • Sleep apnea       does not uses CPAP or BiPAP          Surgical History          Past Surgical History:   Procedure Laterality Date   • CARDIAC CATHETERIZATION N/A 2010     Dr. Araiza; total RCA, occ SVG to RCA, subtotal small mild Cx, patent LIMA to LAD, MYA to OM; EF normal   • CATARACT EXTRACTION W/ INTRAOCULAR LENS IMPLANT   2012     Dr. Carlos   • CHOLECYSTECTOMY N/A 08/22/2008     Dr. Karlie Rabago   • COLONOSCOPY N/A 12/01/2013   • CORONARY ARTERY BYPASS GRAFT N/A 11/11/1997     Quadruple CABG-Dr. Henson   • INCISIONAL HERNIA REPAIR N/A 08/22/2008     Dr. Karlie Rabago   • TOTAL HIP ARTHROPLASTY Right 04/29/2015     Right anterior approach total hip arthroplasty-Dr. Albert Espino   • TOTAL HIP ARTHROPLASTY Left 12/22/2014     Left anterior approach total hip arthroplasty-Dr. Albert Espino               Family History   Problem Relation Age of Onset   • Heart disease Mother     • Hypertension Mother         Social History    Social History            Social History   • Marital status:        Spouse name: N/A   • Number of children: 3   • Years of education: N/A           Occupational History   •   Retired             Social History Main Topics   • Smoking status: Former Smoker       Types: Cigarettes       Quit date: 1989   • Smokeless tobacco: Never Used         Comment: quit 28 yrs ago   • Alcohol use No   • Drug use: Defer   • Sexual activity: Defer           Other Topics Concern   • Not on file      Social History Narrative         Occupation/Additional Social Hx: Retired, will have assistance from a marquita Tomas, if  "surgery is needed.  If planning a trip to [place] in April with her sister and 2 other ladies, and is very intent upon being recuperated by that time        Current Outpatient Prescriptions:   •  amLODIPine-benazepril (LOTREL 2.5-10) 2.5-10 MG per capsule, TAKE ONE CAPSULE BY MOUTH EVERY DAY, Disp: 30 capsule, Rfl: 5  •  aspirin 81 MG tablet, Take 81 mg by mouth Daily., Disp: , Rfl:   •  atorvastatin (LIPITOR) 10 MG tablet, TAKE ONE TABLET BY MOUTH EVERY DAY, Disp: 30 tablet, Rfl: 5  •  Cyanocobalamin (VITAMIN B-12 PO), Take 1 tablet by mouth Daily., Disp: , Rfl:   •  dicyclomine (BENTYL) 20 MG tablet, Take 20 mg by mouth Every 6 (Six) Hours., Disp: , Rfl:   •  DULoxetine (CYMBALTA) 30 MG capsule, TAKE ONE CAPSULE BY MOUTH EVERY DAY, Disp: 90 capsule, Rfl: 0  •  isosorbide mononitrate (IMDUR) 30 MG 24 hr tablet, TAKE ONE TABLET BY MOUTH EVERY DAY, Disp: 90 tablet, Rfl: 0  •  metoprolol succinate XL (TOPROL-XL) 50 MG 24 hr tablet, TAKE ONE TABLET BY MOUTH DAILY, Disp: 30 tablet, Rfl: 6  •  nitroglycerin (NITROSTAT) 0.4 MG SL tablet, Place 0.4 mg under the tongue Every 5 (Five) Minutes As Needed., Disp: , Rfl:   •  carvedilol (COREG) 6.25 MG tablet, Take 6.25 mg by mouth Daily., Disp: , Rfl:   •  chlorthalidone (HYGROTON) 25 MG tablet, Take 1 tablet by mouth Daily., Disp: 30 tablet, Rfl: 5  •  fenofibrate (TRICOR) 145 MG tablet, Take 1 tablet by mouth Daily., Disp: 30 tablet, Rfl: 5  •  lisinopril (PRINIVIL,ZESTRIL) 10 MG tablet, Take 1 tablet by mouth Daily., Disp: 90 tablet, Rfl: 3     No Known Allergies     Review of Systems   HENT: Positive for postnasal drip.    Gastrointestinal: Positive for diarrhea.   Hematological: Bruises/bleeds easily.   All other systems reviewed and are negative.         Vitals        Vitals:     12/13/17 1428   Pulse: 63   SpO2: 96%   Weight: 79.4 kg (175 lb)   Height: 162.6 cm (64\")            PHYSICAL EXAM:     Pulse 63  Ht 162.6 cm (64\")  Wt 79.4 kg (175 lb)  LMP  (LMP Unknown)  " SpO2 96%  BMI 30.04 kg/m2  Body mass index is 30.04 kg/(m^2).     Constitutional: well developed, well nourished, Appears stated age  Eyes: sclera nonicteric, conjunctiva not injected or edematous  ENMT: Hearing intact, trachea midline, thyroid without masses  CVS: RRR, no murmur, peripheral edema not present, inferior to the sternum which has a bicycle the nature at the inferior aspect, is a very protuberant mass, at least 8 cm in diameters, protruding at least 3 cm, thin overlying skin, with large sac, and tissue that somewhat difficult to reduce, fatty in nature, with mediastinal chest tube incisional scars inferior to that  Respiratory: CTA, normal respiratory effort   Gastrointestinal: no hepatosplenomegaly, abdomen soft, nontender, nondistended, abdominal hernia not identified  Genitourinary: inguinal hernia not identified  Musculoskeletal: gait normal, muscle mass normal  Skin: warm and dry, no rashes visible  Neurological: awake and alert, seems to have reasonable capacity for understanding for medical decision making  Psychiatric: appears to have reasonable judgement, pleasant  Lymphatics: no cervical adenopathy      Radiographic Findings: None, except chest x-ray with mild cardiomegaly, 2015     Lab Findings: Creatinine is 1.06, hemoglobin A1c 6.3, triglycerides 387, hemoglobin 13.9, platelets 278     Pamphlet reviewed: Hernia     IMPRESSION:  · Subxiphoid incisional hernia, recurrent, with defect probably at least 6 cm, with the surrounding structures being that of bone associated with the sternum and ribs area and this is not a situation in which a advancement flap will allow the fascia to come together such that a nonsynthetic mesh can be used.  In this location, a permanent synthetic mesh will be necessary  · Diabetes with elevated hemoglobin A1c at 6.3  · Sleep apnea with noncompliance  · Hypertension and hypertriglyceridemia, followed by Dr. Nereida Henson, on amlodipine, Lipitor, isosorbide,  metoprolol, Nitrostat when necessary, lisinopril, chlorthalidone  · Reported chronic kidney disease stage III, with creatinine of 1.06  · Cymbalta use     PLAN:  · Open recurrent incisional hernia repair with mesh.  I discussed with her the difficulty of this location, being the immediate proximity to the bones, and the need to use a permanent mesh.  I think is quite likely that she'll need to stay in the hospital overnight due to the location and the discomfort associated with mesh secured in this location.  I think an open repair is going to be in her best interest due to the size of the sac, and the potential for seroma here.  A drain will likely be necessary  · Follow-up with Dr. Henson prior to the surgery, for clearance from a cardiac standpoint.     Nereida Almodovar M.D.  12/13/17        Addendum  1/6/2018  Patient was seen by Dr. Nereida Henson in her office, and has been cleared for surgery with acceptable risk.  She did not recommend any medication changes.

## 2018-01-26 NOTE — PLAN OF CARE
Problem: Perioperative Period (Adult)  Goal: Signs and Symptoms of Listed Potential Problems Will be Absent or Manageable (Perioperative Period)  Outcome: Ongoing (interventions implemented as appropriate)   01/26/18 0943   Perioperative Period   Problems Assessed (Perioperative Period) all   Problems Present (Perioperative Period) pain

## 2018-01-26 NOTE — ANESTHESIA PREPROCEDURE EVALUATION
Anesthesia Evaluation     Patient summary reviewed and Nursing notes reviewed   NPO Solid Status: > 8 hours  NPO Liquid Status: > 8 hours     Airway   Mallampati: II  TM distance: >3 FB  Neck ROM: full  no difficulty expected  Dental          Pulmonary - normal exam   (+) shortness of breath (dyspnea on exertion), sleep apnea,   (-) recent URI  Cardiovascular   Exercise tolerance: good (4-7 METS)    NYHA Classification: I  ECG reviewed  Patient on routine beta blocker and Beta blocker given within 24 hours of surgery  Rhythm: regular  Rate: normal    (+) hypertension well controlled, CAD, CABG (1997), PVD (carotid artery stenosis), hyperlipidemia  (-) angina, CHF      Neuro/Psych  (+) numbness (lumbar radiculopathy), psychiatric history Depression,     GI/Hepatic/Renal/Endo    (+)  renal disease (ckd stage III, folllowed by nephrology) CRI, diabetes mellitus type 2 well controlled,   (-) hypothyroidism    Musculoskeletal     Abdominal  - normal exam   Substance History   (-) alcohol use, drug use     OB/GYN negative ob/gyn ROS         Other   (+) arthritis                                           Anesthesia Plan    ASA 3     general     intravenous induction   Anesthetic plan and risks discussed with patient and child.    Plan discussed with attending.

## 2018-01-26 NOTE — ANESTHESIA POSTPROCEDURE EVALUATION
"Patient: Navi Kinney    Procedure Summary     Date Anesthesia Start Anesthesia Stop Room / Location    01/26/18 0723 0919  [facility] /  [facility]       Procedure Diagnosis Surgeon Provider    open recurrent INCISIONAL HERNIA REPAIR with mesh (N/A Abdomen) Recurrent incisional hernia  (Recurrent incisional hernia [K43.2]) MD Radha Hernandez MD          Anesthesia Type: general  Last vitals  BP   123/74 (01/26/18 1015)   Temp   36.4 °C (97.6 °F) (01/26/18 1015)   Pulse   64 (01/26/18 1015)   Resp   16 (01/26/18 1015)     SpO2   97 % (01/26/18 1015)     Post Anesthesia Care and Evaluation    Patient location during evaluation: PACU  Patient participation: complete - patient participated  Level of consciousness: awake and alert  Pain management: adequate  Airway patency: patent  Anesthetic complications: No anesthetic complications    Cardiovascular status: acceptable  Respiratory status: acceptable  Hydration status: acceptable    Comments: /74  Pulse 64  Temp 36.4 °C (97.6 °F) (Oral)   Resp 16  Ht 162.6 cm (64\")  Wt 78.3 kg (172 lb 9.6 oz)  LMP  (LMP Unknown)  SpO2 97%  BMI 29.63 kg/m2        "

## 2018-01-26 NOTE — PLAN OF CARE
Problem: Patient Care Overview (Adult)  Goal: Plan of Care Review  Outcome: Ongoing (interventions implemented as appropriate)   01/26/18 0655   Coping/Psychosocial Response Interventions   Plan Of Care Reviewed With patient   Patient Care Overview   Progress progress toward functional goals as expected     Goal: Adult Individualization and Mutuality  Outcome: Ongoing (interventions implemented as appropriate)   01/26/18 0655   Individualization   Patient Specific Preferences pt goes by Navi and is hard of hearing, does not wear hearing aides.     Goal: Discharge Needs Assessment  Outcome: Ongoing (interventions implemented as appropriate)   01/26/18 0633 01/26/18 0655   Discharge Needs Assessment   Concerns To Be Addressed --  denies needs/concerns at this time   Living Environment   Transportation Available car;family or friend will provide --    Self-Care   Equipment Currently Used at Home none --        Problem: Perioperative Period (Adult)  Goal: Signs and Symptoms of Listed Potential Problems Will be Absent or Manageable (Perioperative Period)  Outcome: Ongoing (interventions implemented as appropriate)   01/26/18 0655   Perioperative Period   Problems Assessed (Perioperative Period) pain;infection;perioperative injury   Problems Present (Perioperative Period) none

## 2018-01-26 NOTE — OP NOTE
SURGERY  Operative Note :  NISHI    Navi Kinney  1940    Procedure Date: 01/26/18    Pre-op Diagnosis:  · Recurrent incisional hernia    Post-op Diagnosis:  · Recurrent subxiphoid incisional hernia, CABG incision  · Mediastinal chest tube incisional hernia    Procedure:   · Repair of recurrent and initial ventral hernia, with underlay, extraperitoneal polypropylene mesh    Surgeon: Nishi    Assistant: Bryce    Indications:  · Nice 77-year-old, who underwent a CABG, and had a resultant subxiphoid hernia.  She thereafter had surgery by Dr. Jaqueline grossman, where the hernia was fixed.  She presents now with a large protrusion at the site, at least 8 cm.    Findings:   · Defect at subxiphoid region, 3 cm, with adjacent defect 8 mm from mediastinal chest tube,  by about 2 cm  · Mesh 10 x 7 cm, vertically oriented, laying beneath the xiphoid, and extending below the mediastinal chest tube defect by 3 cm  · PRP and Exparel used    Recommendations:   · Overnight observation, and instruction on drain care, with discharge in the morning.  Rx will be written for such, and likely can be discharged by phone confirmation of stable evening.  · Follow-up in the office when drain output is less than 20 cc daily, for removal    Specimens:   · None    EBL: Less than 50 cc    Technique:     Gen. anesthetic was induced, Daly catheter placed, abdomen prepped with Hibiclens and draped sterilely.  Area had been marked in holding, and was situated immediately below a bifid xiphoid.    I began by excising the old scar elliptically, vertically, and then carrying down through subcutaneous tissue with the cautery area in the midportion of my incision, the amount of subcutaneous fat with minimal, only about a centimeter in depth, elsewhere 2-3.  I dissected down around the sac to the neck.  I then transected the sac about 2 cm outside the fascial plane, such that I would be able to reapproximate the peritoneum below the  mesh.    A then used a Ronak, and went circumferentially and dissected the peritoneum off the undersurface of the fascia.  This was done for distance of about 3-4 cm circumferentially.    In the superior aspect, this clearly went below the xiphoid and sternum.  Our the xiphoid appeared 0.8 and actually resected this with the cautery.    As I took down the fatty tissue lying over the anterior fascia, to allow placement of our sutures, I detected the additional hernia of the mediastinal chest tube defect, and thus had to dissect down further both extraperitoneally under the fascia, and over the fascia anteriorly.    The peritoneum was then closed with a running 3-0 Vicryl, to ensure that this mesh would be completely extraperitoneal, thus allowing us to use the straightforward polypropylene mesh, without the need for Seprafilm technology.    I then measured the defect, and fashioned my mesh approximately 10 x 7 cm.  Initially I had cut it larger than this, then went into the abdomen, removed again and tailored it.  Thus it was ovoid ultimately.  I then secured on either side of the xiphoid with 0 PDS, through the abdominal wall, through the mesh then back out through the abdominal wall.  This was done circumferentially around the periphery of the mesh.  In particular the inferior sutures were difficult because it was quite a distance from the defect that was 3 cm to the inferior portion of the mesh, due to the adjacent small defect, and thus I had to work under a roof of fascia fairly extensively for this portion.  Ultimately I placed approximately 15 sutures.    I then tied the silk and the mesh laid very nicely in a flat manner.  I then tacked the defect back together with interrupted 0 PDS just to have the mesh covered, with very thin filmy tissue.    I then injected Exparel intramuscularly circumferentially, and sprayed over the fascia with rich PRP.  Related drain into that area, then closed the deep fascial  layer of the subcutaneous fat with interrupted 3-0 Vicryl.  I placed the poor PRP over that, and then closed with interrupted 3-0 Vicryl to the dermis, running 4-0 Vicryl to the skin.  Skin affix    Nereida Almodovar MD  01/26/18  9:11 AM

## 2018-01-27 VITALS
OXYGEN SATURATION: 97 % | SYSTOLIC BLOOD PRESSURE: 129 MMHG | WEIGHT: 172.6 LBS | HEART RATE: 65 BPM | DIASTOLIC BLOOD PRESSURE: 77 MMHG | TEMPERATURE: 99.1 F | RESPIRATION RATE: 18 BRPM | HEIGHT: 64 IN | BODY MASS INDEX: 29.47 KG/M2

## 2018-01-27 LAB — GLUCOSE BLDC GLUCOMTR-MCNC: 118 MG/DL (ref 70–130)

## 2018-01-27 PROCEDURE — G0378 HOSPITAL OBSERVATION PER HR: HCPCS

## 2018-01-27 PROCEDURE — 82962 GLUCOSE BLOOD TEST: CPT

## 2018-01-27 RX ADMIN — DAKIN'S SOLUTION 0.125% (QUARTER STRENGTH) 946 ML: 0.12 SOLUTION at 08:58

## 2018-01-27 RX ADMIN — LISINOPRIL: 10 TABLET ORAL at 08:59

## 2018-01-27 RX ADMIN — OXYCODONE HYDROCHLORIDE AND ACETAMINOPHEN 1 TABLET: 10; 325 TABLET ORAL at 09:59

## 2018-01-27 NOTE — PLAN OF CARE
Problem: Patient Care Overview (Adult)  Goal: Plan of Care Review  Outcome: Ongoing (interventions implemented as appropriate)   01/26/18 1800   Coping/Psychosocial Response Interventions   Plan Of Care Reviewed With patient   Patient Care Overview   Progress improving   Outcome Evaluation   Outcome Summary/Follow up Plan Patient is not complaining of pain at this time. Patient is ambulating independently. Daly catheter removed and patient voiding per BRP. Drain care demostrated and taught to patient. Patient anticipates d/c home tomorrow.      Goal: Adult Individualization and Mutuality  Outcome: Ongoing (interventions implemented as appropriate)   01/26/18 0655   Individualization   Patient Specific Preferences pt goes by Navi and is hard of hearing, does not wear hearing aides.     Goal: Discharge Needs Assessment  Outcome: Ongoing (interventions implemented as appropriate)   01/26/18 0633 01/26/18 1800   Discharge Needs Assessment   Discharge Disposition --  home or self-care   Living Environment   Transportation Available car;family or friend will provide --        Problem: Perioperative Period (Adult)  Goal: Signs and Symptoms of Listed Potential Problems Will be Absent or Manageable (Perioperative Period)  Outcome: Ongoing (interventions implemented as appropriate)   01/26/18 1800   Perioperative Period   Problems Assessed (Perioperative Period) all   Problems Present (Perioperative Period) pain       Problem: Pain, Acute (Adult)  Goal: Identify Related Risk Factors and Signs and Symptoms  Outcome: Ongoing (interventions implemented as appropriate)   01/26/18 1800   Pain, Acute   Related Risk Factors (Acute Pain) surgery   Signs and Symptoms (Acute Pain) verbalization of pain descriptors;facial mask of pain/grimace     Goal: Acceptable Pain Control/Comfort Level  Outcome: Ongoing (interventions implemented as appropriate)   01/26/18 1800   Pain, Acute (Adult)   Acceptable Pain Control/Comfort Level achieves  outcome

## 2018-01-27 NOTE — PLAN OF CARE
Problem: Patient Care Overview (Adult)  Goal: Plan of Care Review  Outcome: Ongoing (interventions implemented as appropriate)   01/27/18 0432   Coping/Psychosocial Response Interventions   Plan Of Care Reviewed With patient   Patient Care Overview   Progress improving   Outcome Evaluation   Outcome Summary/Follow up Plan POST-op hernia repair, incision open to air, no drainage, sabina drain patent, voiding without difficulty, denies any pain, educated on the importance of BP monitoring related to history of HTN     Goal: Adult Individualization and Mutuality  Outcome: Ongoing (interventions implemented as appropriate)   01/27/18 0432   Individualization   Patient Specific Preferences none stated     Goal: Discharge Needs Assessment  Outcome: Ongoing (interventions implemented as appropriate)   01/26/18 0633 01/26/18 0655 01/27/18 0432   Discharge Needs Assessment   Concerns To Be Addressed --  denies needs/concerns at this time --    Equipment Needed After Discharge --  --  wound care supplies   Discharge Facility/Level Of Care Needs --  --  home with home health   Discharge Disposition --  --  still a patient   Current Health   Anticipated Changes Related to Illness --  --  none   Living Environment   Transportation Available car;family or friend will provide --  --    Self-Care   Equipment Currently Used at Home none --  --        Problem: Perioperative Period (Adult)  Goal: Signs and Symptoms of Listed Potential Problems Will be Absent or Manageable (Perioperative Period)  Outcome: Ongoing (interventions implemented as appropriate)   01/27/18 0432   Perioperative Period   Problems Assessed (Perioperative Period) all   Problems Present (Perioperative Period) pain;situational response       Problem: Pain, Acute (Adult)  Goal: Identify Related Risk Factors and Signs and Symptoms  Outcome: Outcome(s) achieved Date Met: 01/27/18 01/27/18 0432   Pain, Acute   Related Risk Factors (Acute Pain) surgery   Signs and  Symptoms (Acute Pain) verbalization of pain descriptors;facial mask of pain/grimace     Goal: Acceptable Pain Control/Comfort Level  Outcome: Ongoing (interventions implemented as appropriate)   01/27/18 0432   Pain, Acute (Adult)   Acceptable Pain Control/Comfort Level achieves outcome       Problem: Fall Risk (Adult)  Goal: Identify Related Risk Factors and Signs and Symptoms  Outcome: Outcome(s) achieved Date Met: 01/27/18 01/27/18 0432   Fall Risk   Fall Risk: Related Risk Factors culprit medication(s);gait/mobility problems;environment unfamiliar   Fall Risk: Signs and Symptoms presence of risk factors     Goal: Absence of Falls  Outcome: Ongoing (interventions implemented as appropriate)   01/27/18 0432   Fall Risk (Adult)   Absence of Falls achieves outcome

## 2018-01-27 NOTE — PLAN OF CARE
Problem: Patient Care Overview (Adult)  Goal: Plan of Care Review  Outcome: Ongoing (interventions implemented as appropriate)   01/27/18 1010   Coping/Psychosocial Response Interventions   Plan Of Care Reviewed With patient   Patient Care Overview   Progress improving   Outcome Evaluation   Outcome Summary/Follow up Plan Postop hernia repair. incision open to air, no drainage. RUPA drain in place and patient, bulb cleansed with dakins solution. educated patient and family on drain care. hx htn, BP stable, continue to monitor. DC home with family.      Goal: Adult Individualization and Mutuality  Outcome: Ongoing (interventions implemented as appropriate)    Goal: Discharge Needs Assessment  Outcome: Ongoing (interventions implemented as appropriate)      Problem: Perioperative Period (Adult)  Goal: Signs and Symptoms of Listed Potential Problems Will be Absent or Manageable (Perioperative Period)  Outcome: Ongoing (interventions implemented as appropriate)   01/27/18 1010   Perioperative Period   Problems Assessed (Perioperative Period) all   Problems Present (Perioperative Period) pain       Problem: Pain, Acute (Adult)  Goal: Acceptable Pain Control/Comfort Level  Outcome: Ongoing (interventions implemented as appropriate)   01/27/18 1010   Pain, Acute (Adult)   Acceptable Pain Control/Comfort Level achieves outcome       Problem: Fall Risk (Adult)  Goal: Absence of Falls  Outcome: Ongoing (interventions implemented as appropriate)   01/27/18 1010   Fall Risk (Adult)   Absence of Falls achieves outcome

## 2018-02-05 ENCOUNTER — OFFICE VISIT (OUTPATIENT)
Dept: SURGERY | Facility: CLINIC | Age: 78
End: 2018-02-05

## 2018-02-05 DIAGNOSIS — K43.9 VENTRAL HERNIA WITHOUT OBSTRUCTION OR GANGRENE: ICD-10-CM

## 2018-02-05 DIAGNOSIS — K43.2 INCISIONAL HERNIA, WITHOUT OBSTRUCTION OR GANGRENE: Primary | ICD-10-CM

## 2018-02-05 DIAGNOSIS — K43.2 RECURRENT INCISIONAL HERNIA: ICD-10-CM

## 2018-02-05 DIAGNOSIS — K43.2 RECURRENT VENTRAL INCISIONAL HERNIA: ICD-10-CM

## 2018-02-05 PROCEDURE — 99024 POSTOP FOLLOW-UP VISIT: CPT | Performed by: SURGERY

## 2018-02-05 NOTE — PROGRESS NOTES
SURGERY: [facility]  Post op Visit  Navi Kinney  02/05/18    Navi comes back in today after a recurrent incisional hernia on 1/26/18 which included both a recurrent subxyphoid and mediastinal chest tube hernias.  This was repaired via underlay extraperitoneal polypropylene mesh, 10 X 7 cm.      She looks great.  Her drain has minimal.  We will remove that.    She wants to go back to work tomorrow.  i've finally acquiesced to her returning 1/2 days starting this Friday and continuing next week, with full days thereafter.    Nereida Almodovar MD

## 2018-02-23 RX ORDER — DULOXETIN HYDROCHLORIDE 30 MG/1
30 CAPSULE, DELAYED RELEASE ORAL NIGHTLY
Qty: 90 CAPSULE | Refills: 1 | Status: SHIPPED | OUTPATIENT
Start: 2018-02-23 | End: 2018-06-01 | Stop reason: SDUPTHER

## 2018-02-23 RX ORDER — ISOSORBIDE MONONITRATE 30 MG/1
30 TABLET, EXTENDED RELEASE ORAL NIGHTLY
Qty: 90 TABLET | Refills: 1 | Status: SHIPPED | OUTPATIENT
Start: 2018-02-23 | End: 2018-06-01 | Stop reason: SDUPTHER

## 2018-03-02 ENCOUNTER — TELEPHONE (OUTPATIENT)
Dept: SURGERY | Facility: CLINIC | Age: 78
End: 2018-03-02

## 2018-03-02 NOTE — TELEPHONE ENCOUNTER
Pt calling stating she had ventral sx in Jan. Says she is still having pain at inc site. Wants to know if this is normal after this long? Wants to know if she should come see you?    Yes, for that location, in the subxyphoid region, it is very common to have pain this long.  If she is having swelling, fever, redness, or escalating pain, it is not normal and she should be seen if that is the case.  Nereida Almodovar MD

## 2018-03-05 ENCOUNTER — OFFICE VISIT (OUTPATIENT)
Dept: SURGERY | Facility: CLINIC | Age: 78
End: 2018-03-05

## 2018-03-05 ENCOUNTER — TELEPHONE (OUTPATIENT)
Dept: SURGERY | Facility: CLINIC | Age: 78
End: 2018-03-05

## 2018-03-05 VITALS — OXYGEN SATURATION: 96 % | HEIGHT: 65 IN | BODY MASS INDEX: 29.66 KG/M2 | WEIGHT: 178 LBS | HEART RATE: 79 BPM

## 2018-03-05 DIAGNOSIS — K43.2 INCISIONAL HERNIA, WITHOUT OBSTRUCTION OR GANGRENE: Primary | ICD-10-CM

## 2018-03-05 PROCEDURE — 99024 POSTOP FOLLOW-UP VISIT: CPT | Performed by: SURGERY

## 2018-03-05 NOTE — TELEPHONE ENCOUNTER
Since i didn't have to add cases on today, i can see her today.  Have her get here at 11:30.  Nereida Almodovar MD

## 2018-03-05 NOTE — PROGRESS NOTES
SURGERY: [facility]  Post op Visit  Navi Kinney  03/05/18    Navi comes back in today after a recurrent incisional hernia on 1/26/18 which included both a recurrent subxyphoid and mediastinal chest tube hernias.  This was repaired via underlay extraperitoneal polypropylene mesh, 10 X 7 cm.  On her postop visit, she looks great, drain was removed, and after much discussion, I finally acquiesced to her returning half days starting quickly after surgery, she saying that she just needed to do that.    She comes in today, now saying that she's having intense pain of a burning nature, tugging nature, that occurs to the left of the incision, that occurs only when she starting a certain direction.  She confides that she's become somewhat emotional over this, as she has a new gram baby, that she plans to go and see in [place].  I believe that she is afraid this will impact her ability to go, or that she will be able to be a full capacity.  At any rate, she becomes tearful about it while discussing him in the office.  She says that this has been a bigger deal than she anticipated.    On exam I can't see any changes physically, which she having a little bit of puffiness at the subcutaneous region, which is expected after the subxiphoid hernias, due to the degree of empty space that's present, with fixed adjacent tissues, and little tissue to fill that void.  She doesn't have point tenderness or anything to suggest a recurrence of a hernia.    We discussed the possibility that this could be fluid accumulation, and which case we could get a CT scan and drain it, but that would increase the risk that the mesh would become infected.  We discussed the fact that it could be a stitch that's pulling, and I think it's less likely that its and neuroma.    Now that she's been reassured, she just can watch it over the next week or so, and with it is still bothering her next week, she can call and we will get a CT.  If there is fluid we can  consider aspiration, and if not, and she has a point tenderness that can be identified, we can consider a steroid injection.    Nereida Almodovar MD  03/05/18

## 2018-03-23 ENCOUNTER — OFFICE VISIT (OUTPATIENT)
Dept: ORTHOPEDIC SURGERY | Facility: CLINIC | Age: 78
End: 2018-03-23

## 2018-03-23 VITALS — HEIGHT: 64 IN | TEMPERATURE: 98.4 F | BODY MASS INDEX: 30.56 KG/M2 | WEIGHT: 179 LBS

## 2018-03-23 DIAGNOSIS — G89.29 CHRONIC PAIN OF LEFT KNEE: Primary | ICD-10-CM

## 2018-03-23 DIAGNOSIS — M25.562 CHRONIC PAIN OF LEFT KNEE: Primary | ICD-10-CM

## 2018-03-23 DIAGNOSIS — M25.562 ACUTE PAIN OF LEFT KNEE: ICD-10-CM

## 2018-03-23 PROCEDURE — 20610 DRAIN/INJ JOINT/BURSA W/O US: CPT | Performed by: NURSE PRACTITIONER

## 2018-03-23 PROCEDURE — 73562 X-RAY EXAM OF KNEE 3: CPT | Performed by: NURSE PRACTITIONER

## 2018-03-23 RX ORDER — METHYLPREDNISOLONE ACETATE 80 MG/ML
80 INJECTION, SUSPENSION INTRA-ARTICULAR; INTRALESIONAL; INTRAMUSCULAR; SOFT TISSUE
Status: COMPLETED | OUTPATIENT
Start: 2018-03-23 | End: 2018-03-23

## 2018-03-23 RX ORDER — LIDOCAINE HYDROCHLORIDE 20 MG/ML
2 INJECTION, SOLUTION EPIDURAL; INFILTRATION; INTRACAUDAL; PERINEURAL
Status: COMPLETED | OUTPATIENT
Start: 2018-03-23 | End: 2018-03-23

## 2018-03-23 RX ADMIN — LIDOCAINE HYDROCHLORIDE 2 ML: 20 INJECTION, SOLUTION EPIDURAL; INFILTRATION; INTRACAUDAL; PERINEURAL at 15:49

## 2018-03-23 RX ADMIN — METHYLPREDNISOLONE ACETATE 80 MG: 80 INJECTION, SUSPENSION INTRA-ARTICULAR; INTRALESIONAL; INTRAMUSCULAR; SOFT TISSUE at 15:49

## 2018-03-23 NOTE — PROGRESS NOTES
Large Joint Arthrocentesis  Date/Time: 3/23/2018 3:49 PM  Consent given by: patient  Site marked: site marked  Timeout: Immediately prior to procedure a time out was called to verify the correct patient, procedure, equipment, support staff and site/side marked as required   Supporting Documentation  Indications: pain   Procedure Details  Location: knee - L knee  Needle size: 25 G  Approach: anteromedial  Medications administered: 2 mL lidocaine PF 2% 2 %; 80 mg methylPREDNISolone acetate 80 MG/ML  Patient tolerance: patient tolerated the procedure well with no immediate complications        3 views left knee were ordered and reviewed today secondary to pain and show arthritic changes.  Comparative views are unchanged.  Patient had previous left knee cortisone injection like to have another one today.  She understands the risks that were discussed including pain, infection, elevated blood sugar.  Patient verbalized understanding would like to proceed with injection    Lyla SARABIA

## 2018-05-02 RX ORDER — FENOFIBRATE 145 MG/1
145 TABLET, COATED ORAL DAILY
Qty: 30 TABLET | Refills: 5 | Status: SHIPPED | OUTPATIENT
Start: 2018-05-02 | End: 2018-10-30 | Stop reason: SDUPTHER

## 2018-05-02 RX ORDER — AMLODIPINE BESYLATE AND BENAZEPRIL HYDROCHLORIDE 2.5; 1 MG/1; MG/1
1 CAPSULE ORAL NIGHTLY
Qty: 90 CAPSULE | Refills: 3 | Status: SHIPPED | OUTPATIENT
Start: 2018-05-02 | End: 2019-03-07 | Stop reason: SDUPTHER

## 2018-05-04 RX ORDER — ATORVASTATIN CALCIUM 10 MG/1
10 TABLET, FILM COATED ORAL DAILY
Qty: 30 TABLET | Refills: 3 | Status: SHIPPED | OUTPATIENT
Start: 2018-05-04 | End: 2018-09-12 | Stop reason: SDUPTHER

## 2018-05-16 RX ORDER — METOPROLOL SUCCINATE 50 MG/1
50 TABLET, EXTENDED RELEASE ORAL NIGHTLY
Qty: 30 TABLET | Refills: 3 | Status: SHIPPED | OUTPATIENT
Start: 2018-05-16 | End: 2018-09-21 | Stop reason: SDUPTHER

## 2018-06-01 RX ORDER — ISOSORBIDE MONONITRATE 30 MG/1
30 TABLET, EXTENDED RELEASE ORAL NIGHTLY
Qty: 90 TABLET | Refills: 1 | Status: SHIPPED | OUTPATIENT
Start: 2018-06-01 | End: 2018-11-28 | Stop reason: SDUPTHER

## 2018-06-01 RX ORDER — DULOXETIN HYDROCHLORIDE 30 MG/1
30 CAPSULE, DELAYED RELEASE ORAL NIGHTLY
Qty: 90 CAPSULE | Refills: 1 | Status: SHIPPED | OUTPATIENT
Start: 2018-06-01 | End: 2018-11-28 | Stop reason: SDUPTHER

## 2018-07-05 RX ORDER — CHLORTHALIDONE 25 MG/1
25 TABLET ORAL DAILY
Qty: 30 TABLET | Refills: 5 | Status: SHIPPED | OUTPATIENT
Start: 2018-07-05 | End: 2019-01-03 | Stop reason: SDUPTHER

## 2018-07-09 ENCOUNTER — OFFICE VISIT (OUTPATIENT)
Dept: INTERNAL MEDICINE | Facility: CLINIC | Age: 78
End: 2018-07-09

## 2018-07-09 VITALS
OXYGEN SATURATION: 96 % | TEMPERATURE: 97.8 F | BODY MASS INDEX: 29.88 KG/M2 | WEIGHT: 175 LBS | DIASTOLIC BLOOD PRESSURE: 68 MMHG | HEIGHT: 64 IN | SYSTOLIC BLOOD PRESSURE: 100 MMHG | HEART RATE: 94 BPM

## 2018-07-09 DIAGNOSIS — E11.9 NON-INSULIN DEPENDENT TYPE 2 DIABETES MELLITUS (HCC): ICD-10-CM

## 2018-07-09 DIAGNOSIS — N18.30 CHRONIC KIDNEY DISEASE, STAGE III (MODERATE) (HCC): ICD-10-CM

## 2018-07-09 DIAGNOSIS — I10 ESSENTIAL HYPERTENSION: ICD-10-CM

## 2018-07-09 DIAGNOSIS — Z00.00 MEDICARE ANNUAL WELLNESS VISIT, SUBSEQUENT: Primary | ICD-10-CM

## 2018-07-09 DIAGNOSIS — E78.2 MIXED HYPERLIPIDEMIA: ICD-10-CM

## 2018-07-09 DIAGNOSIS — M25.512 ACUTE PAIN OF BOTH SHOULDERS: ICD-10-CM

## 2018-07-09 DIAGNOSIS — I25.10 ATHEROSCLEROSIS OF NATIVE CORONARY ARTERY OF NATIVE HEART WITHOUT ANGINA PECTORIS: ICD-10-CM

## 2018-07-09 DIAGNOSIS — M25.511 ACUTE PAIN OF BOTH SHOULDERS: ICD-10-CM

## 2018-07-09 PROCEDURE — G0439 PPPS, SUBSEQ VISIT: HCPCS | Performed by: INTERNAL MEDICINE

## 2018-07-09 PROCEDURE — 99214 OFFICE O/P EST MOD 30 MIN: CPT | Performed by: INTERNAL MEDICINE

## 2018-07-09 PROCEDURE — 96160 PT-FOCUSED HLTH RISK ASSMT: CPT | Performed by: INTERNAL MEDICINE

## 2018-07-09 NOTE — PROGRESS NOTES
QUICK REFERENCE INFORMATION:  The ABCs of the Annual Wellness Visit    Subsequent Medicare Wellness Visit    HEALTH RISK ASSESSMENT    1940    Recent Hospitalizations:  Recently treated at the following:  [facility].        Current Medical Providers:  Patient Care Team:  Prince Ibrahim MD as PCP - General  Nereida Henson MD as Consulting Physician (Cardiology)        Smoking Status:  History   Smoking Status   • Former Smoker   • Packs/day: 1.00   • Years: 25.00   • Types: Cigarettes   • Quit date: 1989   Smokeless Tobacco   • Never Used       Alcohol Consumption:  History   Alcohol Use No       Depression Screen:   PHQ-2/PHQ-9 Depression Screening 7/9/2018   Little interest or pleasure in doing things 0   Feeling down, depressed, or hopeless 0   Trouble falling or staying asleep, or sleeping too much -   Feeling tired or having little energy -   Poor appetite or overeating -   Feeling bad about yourself - or that you are a failure or have let yourself or your family down -   Trouble concentrating on things, such as reading the newspaper or watching television -   Moving or speaking so slowly that other people could have noticed. Or the opposite - being so fidgety or restless that you have been moving around a lot more than usual -   Thoughts that you would be better off dead, or of hurting yourself in some way -   Total Score 0   If you checked off any problems, how difficult have these problems made it for you to do your work, take care of things at home, or get along with other people? -       Health Habits and Functional and Cognitive Screening:  Functional & Cognitive Status 7/9/2018   Do you have difficulty preparing food and eating? No   Do you have difficulty bathing yourself, getting dressed or grooming yourself? No   Do you have difficulty using the toilet? No   Do you have difficulty moving around from place to place? No   Do you have trouble with steps or getting out of a bed  or a chair? No   In the past year have you fallen or experienced a near fall? No   Current Diet Well Balanced Diet   Dental Exam Up to date   Eye Exam Up to date   Exercise (times per week) 0 times per week   Current Exercise Activities Include None   Do you need help using the phone?  No   Are you deaf or do you have serious difficulty hearing?  No   Do you need help with transportation? No   Do you need help shopping? No   Do you need help preparing meals?  No   Do you need help with housework?  No   Do you need help with laundry? No   Do you need help taking your medications? No   Do you need help managing money? No   Do you ever drive or ride in a car without wearing a seat belt? No   Have you felt unusual stress, anger or loneliness in the last month? No   Who do you live with? Alone   If you need help, do you have trouble finding someone available to you? No   Have you been bothered in the last four weeks by sexual problems? No   Do you have difficulty concentrating, remembering or making decisions? No           Does the patient have evidence of cognitive impairment? No    Aspirin use counseling: Taking ASA appropriately as indicated      Recent Lab Results:  CMP:  Lab Results   Component Value Date     (H) 11/07/2017    BUN 36 (H) 01/15/2018    CREATININE 1.26 (H) 01/15/2018    EGFRIFNONA 41 (L) 01/15/2018    EGFRIFAFRI 59 (L) 11/07/2017    BCR 28.6 (H) 01/15/2018     01/15/2018    K 4.1 01/15/2018    CO2 24.7 01/15/2018    CALCIUM 9.4 01/15/2018    PROTENTOTREF 6.8 11/07/2017    ALBUMIN 4.2 11/07/2017    LABGLOBREF 2.6 11/07/2017    LABIL2 1.6 11/07/2017    BILITOT 0.3 11/07/2017    ALKPHOS 96 11/07/2017    AST 16 11/07/2017    ALT 16 11/07/2017     Lipid Panel:  Lab Results   Component Value Date    TRIG 387 (H) 11/07/2017    HDL 45 11/07/2017    VLDL 77 (H) 11/07/2017    LDLHDL 1.1 11/07/2017     HbA1c:  Lab Results   Component Value Date    HGBA1C 6.3 (H) 11/07/2017       Visual Acuity:  No  exam data present    Age-appropriate Screening Schedule:  Refer to the list below for future screening recommendations based on patient's age, sex and/or medical conditions. Orders for these recommended tests are listed in the plan section. The patient has been provided with a written plan.    Health Maintenance   Topic Date Due   • ZOSTER VACCINE (1 of 2) 12/27/1991   • HEMOGLOBIN A1C  05/07/2018   • DIABETIC FOOT EXAM  05/15/2018   • PNEUMOCOCCAL VACCINES (65+ LOW/MEDIUM RISK) (2 of 2 - PPSV23) 11/30/2018 (Originally 10/14/2017)   • INFLUENZA VACCINE  08/01/2018   • LIPID PANEL  11/07/2018   • URINE MICROALBUMIN  11/07/2018   • MAMMOGRAM  11/07/2019   • COLONOSCOPY  01/08/2024   • TDAP/TD VACCINES (2 - Td) 05/04/2027        Subjective   History of Present Illness    Navi Kinney is a 77 y.o. female who presents for an Subsequent Wellness Visit.    The following portions of the patient's history were reviewed and updated as appropriate: allergies, current medications, past family history, past medical history, past social history, past surgical history and problem list.    Outpatient Medications Prior to Visit   Medication Sig Dispense Refill   • amLODIPine-benazepril (LOTREL 2.5-10) 2.5-10 MG per capsule Take 1 capsule by mouth Every Night. 90 capsule 3   • aspirin 81 MG tablet Take 81 mg by mouth Daily.     • atorvastatin (LIPITOR) 10 MG tablet Take 1 tablet by mouth Daily. 30 tablet 3   • chlorthalidone (HYGROTON) 25 MG tablet Take 1 tablet by mouth Daily. 30 tablet 5   • Cyanocobalamin (VITAMIN B-12 PO) Take 500 mcg by mouth Every Night.     • dicyclomine (BENTYL) 20 MG tablet Take 20 mg by mouth Every 6 (Six) Hours As Needed (diarrhea and cramping).     • DULoxetine (CYMBALTA) 30 MG capsule Take 1 capsule by mouth Every Night. 90 capsule 1   • fenofibrate (TRICOR) 145 MG tablet Take 1 tablet by mouth Daily. 30 tablet 5   • isosorbide mononitrate (IMDUR) 30 MG 24 hr tablet Take 1 tablet by mouth Every Night.  "90 tablet 1   • metoprolol succinate XL (TOPROL-XL) 50 MG 24 hr tablet Take 1 tablet by mouth Every Night. 30 tablet 3   • nitroglycerin (NITROSTAT) 0.4 MG SL tablet Place 0.4 mg under the tongue Every 5 (Five) Minutes As Needed.       No facility-administered medications prior to visit.        Patient Active Problem List   Diagnosis   • Acute upper respiratory infection   • Ankle pain   • Arthralgia of hip   • Atherosclerosis of coronary artery   • Chronic kidney disease, stage III (moderate)   • Depression   • Non-insulin dependent type 2 diabetes mellitus (CMS/HCC)   • Dyspnea on exertion   • Foot pain   • Hypertension   • Hyperlipidemia   • Degeneration of intervertebral disc of lumbar region   • Lumbar radiculopathy   • Spinal stenosis of lumbar region   • Osteoarthritis of hip   • Palpitations   • Seborrheic keratosis   • Visit for screening mammogram   • Torus palatinus   • Medicare annual wellness visit, initial   • Osteoarthritis of knee       Advance Care Planning:  has an advance directive - a copy has been provided and is in file    Identification of Risk Factors:  Risk factors include: weight  and inactivity.    Review of Systems    Compared to one year ago, the patient feels her physical health is the same.  Compared to one year ago, the patient feels her mental health is the same.    Objective     Physical Exam    Vitals:    07/09/18 1150   BP: 100/68   BP Location: Left arm   Patient Position: Sitting   Cuff Size: Adult   Pulse: 94   Temp: 97.8 °F (36.6 °C)   TempSrc: Oral   SpO2: 96%   Weight: 79.4 kg (175 lb)   Height: 162.6 cm (64.02\")   PainSc:   8       Patient's Body mass index is 30.02 kg/m². BMI is above normal parameters. Recommendations include: exercise counseling and nutrition counseling.      Assessment/Plan   Patient Self-Management and Personalized Health Advice  The patient has been provided with information about: diet, exercise and weight management and preventive services " including:   · Exercise counseling provided, Nutrition counseling provided.    Visit Diagnoses:  No diagnosis found.    No orders of the defined types were placed in this encounter.      Outpatient Encounter Prescriptions as of 7/9/2018   Medication Sig Dispense Refill   • amLODIPine-benazepril (LOTREL 2.5-10) 2.5-10 MG per capsule Take 1 capsule by mouth Every Night. 90 capsule 3   • aspirin 81 MG tablet Take 81 mg by mouth Daily.     • atorvastatin (LIPITOR) 10 MG tablet Take 1 tablet by mouth Daily. 30 tablet 3   • chlorthalidone (HYGROTON) 25 MG tablet Take 1 tablet by mouth Daily. 30 tablet 5   • Cyanocobalamin (VITAMIN B-12 PO) Take 500 mcg by mouth Every Night.     • dicyclomine (BENTYL) 20 MG tablet Take 20 mg by mouth Every 6 (Six) Hours As Needed (diarrhea and cramping).     • DULoxetine (CYMBALTA) 30 MG capsule Take 1 capsule by mouth Every Night. 90 capsule 1   • fenofibrate (TRICOR) 145 MG tablet Take 1 tablet by mouth Daily. 30 tablet 5   • isosorbide mononitrate (IMDUR) 30 MG 24 hr tablet Take 1 tablet by mouth Every Night. 90 tablet 1   • metoprolol succinate XL (TOPROL-XL) 50 MG 24 hr tablet Take 1 tablet by mouth Every Night. 30 tablet 3   • nitroglycerin (NITROSTAT) 0.4 MG SL tablet Place 0.4 mg under the tongue Every 5 (Five) Minutes As Needed.       No facility-administered encounter medications on file as of 7/9/2018.        Reviewed use of high risk medication in the elderly: yes  Reviewed for potential of harmful drug interactions in the elderly: yes    Follow Up:  No Follow-up on file.     An After Visit Summary and PPPS with all of these plans were given to the patient.

## 2018-07-09 NOTE — PATIENT INSTRUCTIONS
Medicare Wellness  Personal Prevention Plan of Service     Date of Office Visit:  2018  Encounter Provider:  Prince Ibrahim MD  Place of Service:  [facility] INTERNAL MEDICINE  Patient Name: Navi Kinney  :  1940    As part of the Medicare Wellness portion of your visit today, we are providing you with this personalized preventive plan of services (PPPS). This plan is based upon recommendations of the [place] Preventive Services Task Force (USPSTF) and the Advisory Committee on Immunization Practices (ACIP).    This lists the preventive care services that should be considered, and provides dates of when you are due. Items listed as completed are up-to-date and do not require any further intervention.    Health Maintenance   Topic Date Due   • ZOSTER VACCINE (1 of 2) 1991   • HEMOGLOBIN A1C  2018   • PNEUMOCOCCAL VACCINES (65+ LOW/MEDIUM RISK) (2 of 2 - PPSV23) 2018 (Originally 10/14/2017)   • INFLUENZA VACCINE  2018   • LIPID PANEL  2018   • URINE MICROALBUMIN  2018   • MEDICARE ANNUAL WELLNESS  2019   • DIABETIC FOOT EXAM  2019   • MAMMOGRAM  2019   • COLONOSCOPY  2024   • TDAP/TD VACCINES (2 - Td) 2027       No orders of the defined types were placed in this encounter.      No Follow-up on file.

## 2018-07-10 LAB
ALBUMIN SERPL-MCNC: 4.9 G/DL (ref 3.5–5.2)
ALBUMIN/GLOB SERPL: 2 G/DL
ALP SERPL-CCNC: 57 U/L (ref 39–117)
ALT SERPL-CCNC: 15 U/L (ref 1–33)
AST SERPL-CCNC: 13 U/L (ref 1–32)
BASOPHILS # BLD AUTO: 0.04 10*3/MM3 (ref 0–0.2)
BASOPHILS NFR BLD AUTO: 0.5 % (ref 0–1.5)
BILIRUB SERPL-MCNC: 0.5 MG/DL (ref 0.1–1.2)
BUN SERPL-MCNC: 28 MG/DL (ref 8–23)
BUN/CREAT SERPL: 20.9 (ref 7–25)
CALCIUM SERPL-MCNC: 9.9 MG/DL (ref 8.6–10.5)
CHLORIDE SERPL-SCNC: 94 MMOL/L (ref 98–107)
CHOLEST SERPL-MCNC: 207 MG/DL (ref 0–200)
CK SERPL-CCNC: 72 U/L (ref 20–180)
CO2 SERPL-SCNC: 26.3 MMOL/L (ref 22–29)
CREAT SERPL-MCNC: 1.34 MG/DL (ref 0.57–1)
CRP SERPL-MCNC: 0.35 MG/DL (ref 0–0.5)
EOSINOPHIL # BLD AUTO: 0.14 10*3/MM3 (ref 0–0.7)
EOSINOPHIL NFR BLD AUTO: 1.8 % (ref 0.3–6.2)
ERYTHROCYTE [DISTWIDTH] IN BLOOD BY AUTOMATED COUNT: 12.8 % (ref 11.7–13)
GLOBULIN SER CALC-MCNC: 2.4 GM/DL
GLUCOSE SERPL-MCNC: 122 MG/DL (ref 65–99)
HBA1C MFR BLD: 6.7 % (ref 4.8–5.6)
HCT VFR BLD AUTO: 43 % (ref 35.6–45.5)
HDLC SERPL-MCNC: 46 MG/DL (ref 40–60)
HGB BLD-MCNC: 14.2 G/DL (ref 11.9–15.5)
IMM GRANULOCYTES # BLD: 0.04 10*3/MM3 (ref 0–0.03)
IMM GRANULOCYTES NFR BLD: 0.5 % (ref 0–0.5)
LDLC SERPL CALC-MCNC: 114 MG/DL (ref 0–100)
LDLC/HDLC SERPL: 2.48 {RATIO}
LYMPHOCYTES # BLD AUTO: 2.18 10*3/MM3 (ref 0.9–4.8)
LYMPHOCYTES NFR BLD AUTO: 27.8 % (ref 19.6–45.3)
MCH RBC QN AUTO: 31.8 PG (ref 26.9–32)
MCHC RBC AUTO-ENTMCNC: 33 G/DL (ref 32.4–36.3)
MCV RBC AUTO: 96.2 FL (ref 80.5–98.2)
MICROALBUMIN UR-MCNC: 14.2 UG/ML
MONOCYTES # BLD AUTO: 0.58 10*3/MM3 (ref 0.2–1.2)
MONOCYTES NFR BLD AUTO: 7.4 % (ref 5–12)
NEUTROPHILS # BLD AUTO: 4.9 10*3/MM3 (ref 1.9–8.1)
NEUTROPHILS NFR BLD AUTO: 62.5 % (ref 42.7–76)
PLATELET # BLD AUTO: 347 10*3/MM3 (ref 140–500)
POTASSIUM SERPL-SCNC: 4.2 MMOL/L (ref 3.5–5.2)
PROT SERPL-MCNC: 7.3 G/DL (ref 6–8.5)
RBC # BLD AUTO: 4.47 10*6/MM3 (ref 3.9–5.2)
RHEUMATOID FACT SERPL-ACNC: <10 IU/ML (ref 0–13.9)
SODIUM SERPL-SCNC: 137 MMOL/L (ref 136–145)
T4 FREE SERPL-MCNC: 1.21 NG/DL (ref 0.93–1.7)
TRIGL SERPL-MCNC: 234 MG/DL (ref 0–150)
TSH SERPL DL<=0.005 MIU/L-ACNC: 0.85 MIU/ML (ref 0.27–4.2)
VLDLC SERPL CALC-MCNC: 46.8 MG/DL (ref 5–40)
WBC # BLD AUTO: 7.84 10*3/MM3 (ref 4.5–10.7)

## 2018-07-20 DIAGNOSIS — N28.9 RENAL INSUFFICIENCY: Primary | ICD-10-CM

## 2018-07-22 NOTE — PROGRESS NOTES
Subjective   Navi Kinney is a 77 y.o. female.   She is here today for Medicare annual wellness visit subsequent please see recommendations along with follow-up for acute pain of both shoulders since July 2 of this year as well as hypertension mixed hyperlipidemia and native CAD and NIDDM and chronic kidney disease stage III  History of Present Illness   She is here today for Medicare annual wellness visit subsequent with recommendations noted along with follow-up for acute pain of both shoulders since July 2 of this year which is not getting better as well as hypertension which is well-controlled on current medication mixed hyper lipidemia which is stable on current medication native CAD which is stable on current medication with no evidence of chest pain or anginal equivalents and NIDDM which has been fairly stable on current medication and chronic kidney disease stage III which has been stable as well  The following portions of the patient's history were reviewed and updated as appropriate: allergies, current medications, past family history, past medical history, past social history, past surgical history and problem list.    Review of Systems   Musculoskeletal: Positive for arthralgias (both shoulders).   All other systems reviewed and are negative.      Objective   Physical Exam   Constitutional: She is oriented to person, place, and time. She appears well-developed and well-nourished. She is cooperative.   HENT:   Head: Normocephalic and atraumatic.   Right Ear: Hearing, tympanic membrane, external ear and ear canal normal.   Left Ear: Hearing, tympanic membrane, external ear and ear canal normal.   Nose: Nose normal.   Mouth/Throat: Uvula is midline, oropharynx is clear and moist and mucous membranes are normal.   Eyes: Pupils are equal, round, and reactive to light. Conjunctivae, EOM and lids are normal.   Neck: Phonation normal. Neck supple. Carotid bruit is not present.   Cardiovascular: Normal rate,  regular rhythm and normal heart sounds.  Exam reveals no gallop and no friction rub.    No murmur heard.  Pulmonary/Chest: Effort normal and breath sounds normal. No respiratory distress.   Abdominal: Soft. Bowel sounds are normal. She exhibits no distension and no mass. There is no hepatosplenomegaly. There is no tenderness. There is no rebound and no guarding. No hernia.   Musculoskeletal: She exhibits no edema.        Right shoulder: She exhibits tenderness.        Left shoulder: She exhibits tenderness.   Neurological: She is alert and oriented to person, place, and time. Coordination and gait normal.   Skin: Skin is warm and dry.   Psychiatric: She has a normal mood and affect. Her speech is normal and behavior is normal. Judgment and thought content normal.   Nursing note and vitals reviewed.      Assessment/Plan   Diagnoses and all orders for this visit:    Medicare annual wellness visit, subsequent    Acute pain of both shoulders/ since 7/2/18  -     Rheumatoid Factor  -     C-reactive Protein  -     CK    Essential hypertension  -     CBC & Differential  -     Comprehensive Metabolic Panel    Mixed hyperlipidemia  -     Lipid Panel With LDL / HDL Ratio  -     T4, Free  -     TSH    Atherosclerosis of native coronary artery of native heart without angina pectoris    Non-insulin dependent type 2 diabetes mellitus (CMS/HCC)  -     Hemoglobin A1c  -     MicroAlbumin, Urine, Random - Urine, Clean Catch    Chronic kidney disease, stage III (moderate)      Medicare annual wellness visit subsequent please see recommendations  Acute pain of both shoulders since July 2 of this year we will check rheumatoid factor C-reactive protein and CK and she has no orthopedist  Hypertension well-controlled on current medication no changes needed  Mixed hyper lipidemia stable on current medication with no changes needed  Native CAD no evidence of chest pain or anginal equivalents continue current medication  NIDDM has been stable  on current medication we will check hemoglobin A1 C with urine microalbumin

## 2018-07-24 LAB
ALBUMIN SERPL-MCNC: 4.9 G/DL (ref 3.5–5.2)
BUN SERPL-MCNC: 27 MG/DL (ref 8–23)
BUN/CREAT SERPL: 22.1 (ref 7–25)
CALCIUM SERPL-MCNC: 10.1 MG/DL (ref 8.6–10.5)
CHLORIDE SERPL-SCNC: 98 MMOL/L (ref 98–107)
CO2 SERPL-SCNC: 26 MMOL/L (ref 22–29)
CREAT SERPL-MCNC: 1.22 MG/DL (ref 0.57–1)
GLUCOSE SERPL-MCNC: 106 MG/DL (ref 65–99)
PHOSPHATE SERPL-MCNC: 2.9 MG/DL (ref 2.5–4.5)
POTASSIUM SERPL-SCNC: 4.2 MMOL/L (ref 3.5–5.2)
SODIUM SERPL-SCNC: 139 MMOL/L (ref 136–145)

## 2018-07-25 ENCOUNTER — RESULTS ENCOUNTER (OUTPATIENT)
Dept: INTERNAL MEDICINE | Facility: CLINIC | Age: 78
End: 2018-07-25

## 2018-07-25 DIAGNOSIS — N28.9 RENAL INSUFFICIENCY: ICD-10-CM

## 2018-08-07 DIAGNOSIS — N28.9 ABNORMAL KIDNEY FUNCTION: Primary | ICD-10-CM

## 2018-08-12 ENCOUNTER — RESULTS ENCOUNTER (OUTPATIENT)
Dept: INTERNAL MEDICINE | Facility: CLINIC | Age: 78
End: 2018-08-12

## 2018-08-12 DIAGNOSIS — N28.9 ABNORMAL KIDNEY FUNCTION: ICD-10-CM

## 2018-08-15 ENCOUNTER — HOSPITAL ENCOUNTER (OUTPATIENT)
Dept: GENERAL RADIOLOGY | Facility: HOSPITAL | Age: 78
Discharge: HOME | End: 2018-08-15
Admitting: INTERNAL MEDICINE

## 2018-08-15 ENCOUNTER — OFFICE VISIT (OUTPATIENT)
Dept: INTERNAL MEDICINE | Facility: CLINIC | Age: 78
End: 2018-08-15

## 2018-08-15 VITALS
OXYGEN SATURATION: 94 % | HEIGHT: 64 IN | WEIGHT: 175.2 LBS | BODY MASS INDEX: 29.91 KG/M2 | TEMPERATURE: 98.4 F | SYSTOLIC BLOOD PRESSURE: 117 MMHG | HEART RATE: 81 BPM | DIASTOLIC BLOOD PRESSURE: 71 MMHG

## 2018-08-15 DIAGNOSIS — N18.30 CHRONIC KIDNEY DISEASE, STAGE III (MODERATE) (HCC): Primary | ICD-10-CM

## 2018-08-15 DIAGNOSIS — M25.521 RIGHT ELBOW PAIN: ICD-10-CM

## 2018-08-15 PROCEDURE — 99213 OFFICE O/P EST LOW 20 MIN: CPT | Performed by: INTERNAL MEDICINE

## 2018-08-15 PROCEDURE — 73070 X-RAY EXAM OF ELBOW: CPT

## 2018-08-16 LAB
ALBUMIN SERPL-MCNC: 4.3 G/DL (ref 3.5–5.2)
BUN SERPL-MCNC: 28 MG/DL (ref 8–23)
BUN/CREAT SERPL: 24.6 (ref 7–25)
CALCIUM SERPL-MCNC: 9.7 MG/DL (ref 8.6–10.5)
CHLORIDE SERPL-SCNC: 98 MMOL/L (ref 98–107)
CO2 SERPL-SCNC: 26.2 MMOL/L (ref 22–29)
CREAT SERPL-MCNC: 1.14 MG/DL (ref 0.57–1)
GLUCOSE SERPL-MCNC: 217 MG/DL (ref 65–99)
PHOSPHATE SERPL-MCNC: 2.8 MG/DL (ref 2.5–4.5)
POTASSIUM SERPL-SCNC: 3.8 MMOL/L (ref 3.5–5.2)
SODIUM SERPL-SCNC: 139 MMOL/L (ref 136–145)

## 2018-08-29 DIAGNOSIS — M19.021 ARTHRITIS OF RIGHT ELBOW: Primary | ICD-10-CM

## 2018-08-29 DIAGNOSIS — M77.8 TENDONITIS OF ELBOW, RIGHT: ICD-10-CM

## 2018-08-29 NOTE — PROGRESS NOTES
Subjective   aNvi Kinney is a 77 y.o. female.   She is here to follow-up for chronic kidney disease stage III along with chronic right elbow pain as well  History of Present Illness   She is here to follow-up for chronic kidney disease stage III which has been stable and chronic right elbow pain which is bothering her more  The following portions of the patient's history were reviewed and updated as appropriate: allergies, current medications, past family history, past medical history, past social history, past surgical history and problem list.    Review of Systems   Musculoskeletal: Positive for arthralgias (right elbow).   All other systems reviewed and are negative.      Objective   Physical Exam   Constitutional: She is oriented to person, place, and time. She appears well-developed and well-nourished. She is cooperative.   HENT:   Head: Normocephalic and atraumatic.   Right Ear: Hearing, tympanic membrane, external ear and ear canal normal.   Left Ear: Hearing, tympanic membrane, external ear and ear canal normal.   Nose: Nose normal.   Mouth/Throat: Uvula is midline, oropharynx is clear and moist and mucous membranes are normal.   Eyes: Pupils are equal, round, and reactive to light. Conjunctivae, EOM and lids are normal.   Neck: Phonation normal. Neck supple. Carotid bruit is not present.   Cardiovascular: Normal rate, regular rhythm and normal heart sounds.  Exam reveals no gallop and no friction rub.    No murmur heard.  Pulmonary/Chest: Effort normal and breath sounds normal. No respiratory distress.   Abdominal: Soft. Bowel sounds are normal. She exhibits no distension and no mass. There is no hepatosplenomegaly. There is no tenderness. There is no rebound and no guarding. No hernia.   Musculoskeletal: She exhibits no edema.        Right elbow: Tenderness found.   Neurological: She is alert and oriented to person, place, and time. Coordination and gait normal.   Skin: Skin is warm and dry.    Psychiatric: She has a normal mood and affect. Her speech is normal and behavior is normal. Judgment and thought content normal.   Nursing note and vitals reviewed.      Assessment/Plan   Diagnoses and all orders for this visit:    Chronic kidney disease, stage III (moderate)  -     Renal Function Panel    Right elbow pain  -     XR elbow 2 vw right      Chronic kidney disease stage III has been stable we will check renal function panel today  Right elbow pain not stable and we will get x-ray of the right elbow

## 2018-09-12 RX ORDER — ATORVASTATIN CALCIUM 10 MG/1
10 TABLET, FILM COATED ORAL DAILY
Qty: 30 TABLET | Refills: 3 | Status: SHIPPED | OUTPATIENT
Start: 2018-09-12 | End: 2019-01-11 | Stop reason: SDUPTHER

## 2018-09-17 ENCOUNTER — TELEPHONE (OUTPATIENT)
Dept: INTERNAL MEDICINE | Facility: CLINIC | Age: 78
End: 2018-09-17

## 2018-09-17 RX ORDER — AZITHROMYCIN 250 MG/1
TABLET, FILM COATED ORAL
Qty: 6 TABLET | Refills: 0 | Status: SHIPPED | OUTPATIENT
Start: 2018-09-17 | End: 2018-10-30

## 2018-09-17 RX ORDER — METHYLPREDNISOLONE 4 MG/1
TABLET ORAL
Qty: 21 EACH | Refills: 0 | Status: SHIPPED | OUTPATIENT
Start: 2018-09-17 | End: 2018-10-30

## 2018-09-17 NOTE — TELEPHONE ENCOUNTER
Pt called stating that she thinks she has a sinus infection and had a cough and sinus drainage since saturday

## 2018-09-19 RX ORDER — DICYCLOMINE HCL 20 MG
TABLET ORAL
Qty: 90 TABLET | Refills: 0 | Status: SHIPPED | OUTPATIENT
Start: 2018-09-19 | End: 2018-11-28 | Stop reason: SDUPTHER

## 2018-09-21 RX ORDER — METOPROLOL SUCCINATE 50 MG/1
50 TABLET, EXTENDED RELEASE ORAL NIGHTLY
Qty: 30 TABLET | Refills: 3 | Status: SHIPPED | OUTPATIENT
Start: 2018-09-21 | End: 2019-01-30 | Stop reason: SDUPTHER

## 2018-09-24 ENCOUNTER — TELEPHONE (OUTPATIENT)
Dept: INTERNAL MEDICINE | Facility: CLINIC | Age: 78
End: 2018-09-24

## 2018-09-24 NOTE — TELEPHONE ENCOUNTER
Pt was given a zpak and medrol dose pack on 9/17/18 due to Pt thinking she had a sinus inf. Pt called this morning and has completed both dose pack and ABX, she is still coughing, drainage from eyes and cannot get an appt until Friday with you. Please advise what she should do next?

## 2018-10-17 ENCOUNTER — TELEPHONE (OUTPATIENT)
Dept: INTERNAL MEDICINE | Facility: CLINIC | Age: 78
End: 2018-10-17

## 2018-10-30 ENCOUNTER — TRANSCRIBE ORDERS (OUTPATIENT)
Dept: ADMINISTRATIVE | Facility: HOSPITAL | Age: 78
End: 2018-10-30

## 2018-10-30 DIAGNOSIS — Z12.31 VISIT FOR SCREENING MAMMOGRAM: Primary | ICD-10-CM

## 2018-10-30 RX ORDER — FENOFIBRATE 145 MG/1
145 TABLET, COATED ORAL DAILY
Qty: 30 TABLET | Refills: 5 | Status: SHIPPED | OUTPATIENT
Start: 2018-10-30 | End: 2019-03-12

## 2018-11-13 ENCOUNTER — TRANSCRIBE ORDERS (OUTPATIENT)
Dept: ADMINISTRATIVE | Facility: HOSPITAL | Age: 78
End: 2018-11-13

## 2018-11-13 DIAGNOSIS — R91.8 LUNG NODULES: Primary | ICD-10-CM

## 2018-11-16 ENCOUNTER — HOSPITAL ENCOUNTER (OUTPATIENT)
Dept: CT IMAGING | Facility: HOSPITAL | Age: 78
Discharge: HOME | End: 2018-11-16
Attending: INTERNAL MEDICINE | Admitting: INTERNAL MEDICINE

## 2018-11-16 DIAGNOSIS — R91.8 LUNG NODULES: ICD-10-CM

## 2018-11-16 PROCEDURE — 71250 CT THORAX DX C-: CPT

## 2018-11-27 ENCOUNTER — APPOINTMENT (OUTPATIENT)
Dept: MAMMOGRAPHY | Facility: HOSPITAL | Age: 78
End: 2018-11-27

## 2018-11-28 RX ORDER — ISOSORBIDE MONONITRATE 30 MG/1
30 TABLET, EXTENDED RELEASE ORAL NIGHTLY
Qty: 90 TABLET | Refills: 2 | Status: SHIPPED | OUTPATIENT
Start: 2018-11-28 | End: 2019-08-09 | Stop reason: SDUPTHER

## 2018-11-28 RX ORDER — DULOXETIN HYDROCHLORIDE 30 MG/1
30 CAPSULE, DELAYED RELEASE ORAL NIGHTLY
Qty: 90 CAPSULE | Refills: 2 | Status: SHIPPED | OUTPATIENT
Start: 2018-11-28 | End: 2019-08-26 | Stop reason: SDUPTHER

## 2018-12-05 ENCOUNTER — TELEPHONE (OUTPATIENT)
Dept: INTERNAL MEDICINE | Facility: CLINIC | Age: 78
End: 2018-12-05

## 2018-12-05 DIAGNOSIS — R91.1 LUNG NODULE: Primary | ICD-10-CM

## 2018-12-19 ENCOUNTER — TELEPHONE (OUTPATIENT)
Dept: INTERNAL MEDICINE | Facility: CLINIC | Age: 78
End: 2018-12-19

## 2018-12-19 RX ORDER — METHYLPREDNISOLONE 4 MG/1
TABLET ORAL
Qty: 21 EACH | Refills: 0 | Status: SHIPPED | OUTPATIENT
Start: 2018-12-19 | End: 2019-01-15

## 2018-12-19 RX ORDER — DOXYCYCLINE HYCLATE 100 MG/1
100 TABLET, DELAYED RELEASE ORAL 2 TIMES DAILY
Qty: 20 TABLET | Refills: 0 | Status: SHIPPED | OUTPATIENT
Start: 2018-12-19 | End: 2019-01-15

## 2019-01-03 RX ORDER — CHLORTHALIDONE 25 MG/1
25 TABLET ORAL DAILY
Qty: 90 TABLET | Refills: 2 | Status: SHIPPED | OUTPATIENT
Start: 2019-01-03 | End: 2019-08-09 | Stop reason: SDUPTHER

## 2019-01-04 ENCOUNTER — HOSPITAL ENCOUNTER (OUTPATIENT)
Dept: MAMMOGRAPHY | Facility: HOSPITAL | Age: 79
Discharge: HOME | End: 2019-01-04
Admitting: INTERNAL MEDICINE

## 2019-01-04 DIAGNOSIS — Z12.31 VISIT FOR SCREENING MAMMOGRAM: ICD-10-CM

## 2019-01-04 PROCEDURE — 77063 BREAST TOMOSYNTHESIS BI: CPT

## 2019-01-04 PROCEDURE — 77067 SCR MAMMO BI INCL CAD: CPT

## 2019-01-11 RX ORDER — ATORVASTATIN CALCIUM 10 MG/1
10 TABLET, FILM COATED ORAL DAILY
Qty: 90 TABLET | Refills: 1 | Status: SHIPPED | OUTPATIENT
Start: 2019-01-11 | End: 2020-07-08

## 2019-01-15 ENCOUNTER — OFFICE VISIT (OUTPATIENT)
Dept: INTERNAL MEDICINE | Facility: CLINIC | Age: 79
End: 2019-01-15

## 2019-01-15 VITALS
OXYGEN SATURATION: 96 % | WEIGHT: 172 LBS | BODY MASS INDEX: 29.37 KG/M2 | DIASTOLIC BLOOD PRESSURE: 72 MMHG | HEIGHT: 64 IN | SYSTOLIC BLOOD PRESSURE: 107 MMHG | TEMPERATURE: 98.2 F | RESPIRATION RATE: 16 BRPM | HEART RATE: 64 BPM

## 2019-01-15 DIAGNOSIS — E78.2 MIXED HYPERLIPIDEMIA: ICD-10-CM

## 2019-01-15 DIAGNOSIS — I10 ESSENTIAL HYPERTENSION: Primary | ICD-10-CM

## 2019-01-15 DIAGNOSIS — I25.10 ATHEROSCLEROSIS OF NATIVE CORONARY ARTERY OF NATIVE HEART WITHOUT ANGINA PECTORIS: ICD-10-CM

## 2019-01-15 DIAGNOSIS — N18.30 CHRONIC KIDNEY DISEASE, STAGE III (MODERATE) (HCC): ICD-10-CM

## 2019-01-15 DIAGNOSIS — E11.9 NON-INSULIN DEPENDENT TYPE 2 DIABETES MELLITUS (HCC): ICD-10-CM

## 2019-01-15 PROCEDURE — 99214 OFFICE O/P EST MOD 30 MIN: CPT | Performed by: INTERNAL MEDICINE

## 2019-01-15 PROCEDURE — 90732 PPSV23 VACC 2 YRS+ SUBQ/IM: CPT | Performed by: INTERNAL MEDICINE

## 2019-01-15 PROCEDURE — G0009 ADMIN PNEUMOCOCCAL VACCINE: HCPCS | Performed by: INTERNAL MEDICINE

## 2019-01-15 RX ORDER — NITROGLYCERIN 0.4 MG/1
0.4 TABLET SUBLINGUAL
Qty: 90 TABLET | Refills: 3 | Status: SHIPPED | OUTPATIENT
Start: 2019-01-15 | End: 2022-07-27

## 2019-01-18 ENCOUNTER — TRANSCRIBE ORDERS (OUTPATIENT)
Dept: ADMINISTRATIVE | Facility: HOSPITAL | Age: 79
End: 2019-01-18

## 2019-01-18 DIAGNOSIS — R91.1 LUNG NODULE: Primary | ICD-10-CM

## 2019-01-29 NOTE — PROGRESS NOTES
Subjective   Navi Kinney is a 78 y.o. female.   She is here today for regular follow-up for hypertension mixed hyperlipidemia native CAD NIDDM and chronic kidney disease stage III and she has no complaints  History of Present Illness   She is here today for regular follow-up for hypertension which is stable on current medication mixed hyper lipidemia which is stable on current medication native CAD which is stable at this time NIDDM which is stable on current medication and chronic kidney disease stage III which has been stable up to this point as well  The following portions of the patient's history were reviewed and updated as appropriate: allergies, current medications, past family history, past medical history, past social history, past surgical history and problem list.    Review of Systems   Constitutional: Negative for fatigue.   Respiratory: Negative for shortness of breath and wheezing.    Endocrine: Negative for cold intolerance, polydipsia and polyuria.   Genitourinary: Negative for difficulty urinating.   Neurological: Negative for weakness.   Psychiatric/Behavioral: Negative for decreased concentration and dysphoric mood.   All other systems reviewed and are negative.      Objective   Physical Exam   Constitutional: She is oriented to person, place, and time. She appears well-developed and well-nourished. She is cooperative.   HENT:   Head: Normocephalic and atraumatic.   Right Ear: Hearing, tympanic membrane, external ear and ear canal normal.   Left Ear: Hearing, tympanic membrane, external ear and ear canal normal.   Nose: Nose normal.   Mouth/Throat: Uvula is midline, oropharynx is clear and moist and mucous membranes are normal.   Eyes: Conjunctivae, EOM and lids are normal. Pupils are equal, round, and reactive to light.   Neck: Phonation normal. Neck supple. Carotid bruit is not present.   Cardiovascular: Normal rate, regular rhythm and normal heart sounds. Exam reveals no gallop and no  friction rub.   No murmur heard.  Pulmonary/Chest: Effort normal and breath sounds normal. No respiratory distress.   Abdominal: Soft. Bowel sounds are normal. She exhibits no distension and no mass. There is no hepatosplenomegaly. There is no tenderness. There is no rebound and no guarding. No hernia.   Musculoskeletal: She exhibits no edema.   Neurological: She is alert and oriented to person, place, and time. Coordination and gait normal.   Skin: Skin is warm and dry.   Psychiatric: She has a normal mood and affect. Her speech is normal and behavior is normal. Judgment and thought content normal.   Nursing note and vitals reviewed.      Assessment/Plan   Diagnoses and all orders for this visit:    Essential hypertension    Mixed hyperlipidemia  -     CBC & Differential  -     Comprehensive Metabolic Panel  -     T4, Free  -     TSH  -     Urinalysis With Microscopic - Urine, Clean Catch  -     Lipid Panel With LDL / HDL Ratio    Atherosclerosis of native coronary artery of native heart without angina pectoris    Non-insulin dependent type 2 diabetes mellitus (CMS/HCC)    Chronic kidney disease, stage III (moderate) (CMS/HCC)    Other orders  -     nitroglycerin (NITROSTAT) 0.4 MG SL tablet; Place 1 tablet under the tongue Every 5 (Five) Minutes As Needed for Chest Pain.  -     Pneumococcal Polysaccharide Vaccine 23-Valent Greater Than or Equal To 3yo Subcutaneous / IM      Hypertension well-controlled on current medication no changes  Mixed hyperlipidemia stable on Lipitor we will check liver enzymes and lipid panel today  Native CAD stable with no chest pain  NIDDM stable on current medication we will follow  Chronic kidney disease stage III has been stable up to this point and we will follow closely

## 2019-01-30 RX ORDER — METOPROLOL SUCCINATE 50 MG/1
50 TABLET, EXTENDED RELEASE ORAL NIGHTLY
Qty: 90 TABLET | Refills: 1 | Status: SHIPPED | OUTPATIENT
Start: 2019-01-30 | End: 2019-05-29 | Stop reason: SDUPTHER

## 2019-03-07 RX ORDER — AMLODIPINE BESYLATE AND BENAZEPRIL HYDROCHLORIDE 2.5; 1 MG/1; MG/1
1 CAPSULE ORAL NIGHTLY
Qty: 90 CAPSULE | Refills: 1 | Status: SHIPPED | OUTPATIENT
Start: 2019-03-07 | End: 2019-12-09 | Stop reason: SDUPTHER

## 2019-03-11 ENCOUNTER — TELEPHONE (OUTPATIENT)
Dept: INTERNAL MEDICINE | Facility: CLINIC | Age: 79
End: 2019-03-11

## 2019-03-11 RX ORDER — DOXYCYCLINE HYCLATE 100 MG/1
100 CAPSULE ORAL 2 TIMES DAILY
Qty: 20 CAPSULE | Refills: 0 | Status: SHIPPED | OUTPATIENT
Start: 2019-03-11 | End: 2019-11-08

## 2019-03-11 NOTE — TELEPHONE ENCOUNTER
Pt called and LVM stating she was fighting a sinus infection for a few days now. Dr. Ibrahim advised Vibramycin     RX sent.    Pt advised

## 2019-03-12 ENCOUNTER — OFFICE VISIT (OUTPATIENT)
Dept: ORTHOPEDIC SURGERY | Facility: CLINIC | Age: 79
End: 2019-03-12

## 2019-03-12 VITALS — WEIGHT: 174 LBS | BODY MASS INDEX: 29.71 KG/M2 | TEMPERATURE: 99.5 F | HEIGHT: 64 IN

## 2019-03-12 DIAGNOSIS — M25.562 ACUTE PAIN OF LEFT KNEE: Primary | ICD-10-CM

## 2019-03-12 PROCEDURE — 73562 X-RAY EXAM OF KNEE 3: CPT | Performed by: NURSE PRACTITIONER

## 2019-03-12 PROCEDURE — 20610 DRAIN/INJ JOINT/BURSA W/O US: CPT | Performed by: NURSE PRACTITIONER

## 2019-03-12 RX ORDER — LIDOCAINE HYDROCHLORIDE 10 MG/ML
2 INJECTION, SOLUTION EPIDURAL; INFILTRATION; INTRACAUDAL; PERINEURAL
Status: COMPLETED | OUTPATIENT
Start: 2019-03-12 | End: 2019-03-12

## 2019-03-12 RX ORDER — METHYLPREDNISOLONE ACETATE 80 MG/ML
80 INJECTION, SUSPENSION INTRA-ARTICULAR; INTRALESIONAL; INTRAMUSCULAR; SOFT TISSUE
Status: COMPLETED | OUTPATIENT
Start: 2019-03-12 | End: 2019-03-12

## 2019-03-12 RX ADMIN — METHYLPREDNISOLONE ACETATE 80 MG: 80 INJECTION, SUSPENSION INTRA-ARTICULAR; INTRALESIONAL; INTRAMUSCULAR; SOFT TISSUE at 10:45

## 2019-03-12 RX ADMIN — LIDOCAINE HYDROCHLORIDE 2 ML: 10 INJECTION, SOLUTION EPIDURAL; INFILTRATION; INTRACAUDAL; PERINEURAL at 10:45

## 2019-03-12 NOTE — PROGRESS NOTES
3/12/2019    Navi Kinney is here today for worsening knee pain. Pt has undergone injection of the knee in the past with good resolution of symptoms. Pt is requesting a repeat injection.     KNEE Injection Procedure Note:    Large Joint Arthrocentesis: L knee  Date/Time: 3/12/2019 10:45 AM  Consent given by: patient  Site marked: site marked  Timeout: Immediately prior to procedure a time out was called to verify the correct patient, procedure, equipment, support staff and site/side marked as required   Supporting Documentation  Indications: pain and joint swelling   Procedure Details  Location: knee - L knee  Preparation: Patient was prepped and draped in the usual sterile fashion  Needle gauge: 21 G.  Approach: anterolateral  Medications administered: 2 mL lidocaine PF 1% 1 %; 80 mg methylPREDNISolone acetate 80 MG/ML  Patient tolerance: patient tolerated the procedure well with no immediate complications      Prior to injection risks were discussed including pain, infection, elevated blood sugar.  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  Compared to views are unchanged    At the conclusion of the injection I discussed the importance of continued quad strengthening exercises on a daily basis. I will see the patient back if the symptoms should fail to improve or worsen.    Lyla Jones, APRN  3/12/2019

## 2019-05-13 ENCOUNTER — HOSPITAL ENCOUNTER (OUTPATIENT)
Dept: CT IMAGING | Facility: HOSPITAL | Age: 79
Discharge: HOME | End: 2019-05-13
Admitting: INTERNAL MEDICINE

## 2019-05-13 DIAGNOSIS — R91.1 LUNG NODULE: ICD-10-CM

## 2019-05-13 PROCEDURE — 71250 CT THORAX DX C-: CPT

## 2019-05-29 RX ORDER — METOPROLOL SUCCINATE 50 MG/1
TABLET, EXTENDED RELEASE ORAL
Qty: 90 TABLET | Refills: 0 | Status: SHIPPED | OUTPATIENT
Start: 2019-05-29 | End: 2019-11-29 | Stop reason: SDUPTHER

## 2019-08-09 RX ORDER — CHLORTHALIDONE 25 MG/1
25 TABLET ORAL DAILY
Qty: 90 TABLET | Refills: 2 | Status: SHIPPED | OUTPATIENT
Start: 2019-08-09 | End: 2020-05-04

## 2019-08-09 RX ORDER — ISOSORBIDE MONONITRATE 30 MG/1
30 TABLET, EXTENDED RELEASE ORAL NIGHTLY
Qty: 90 TABLET | Refills: 2 | Status: SHIPPED | OUTPATIENT
Start: 2019-08-09 | End: 2021-03-05

## 2019-08-26 RX ORDER — DULOXETIN HYDROCHLORIDE 30 MG/1
30 CAPSULE, DELAYED RELEASE ORAL NIGHTLY
Qty: 90 CAPSULE | Refills: 2 | Status: SHIPPED | OUTPATIENT
Start: 2019-08-26 | End: 2020-09-15 | Stop reason: SDUPTHER

## 2019-11-08 ENCOUNTER — OFFICE VISIT (OUTPATIENT)
Dept: INTERNAL MEDICINE | Facility: CLINIC | Age: 79
End: 2019-11-08

## 2019-11-08 VITALS
BODY MASS INDEX: 30.73 KG/M2 | SYSTOLIC BLOOD PRESSURE: 128 MMHG | RESPIRATION RATE: 16 BRPM | HEIGHT: 64 IN | OXYGEN SATURATION: 95 % | WEIGHT: 180 LBS | DIASTOLIC BLOOD PRESSURE: 78 MMHG | TEMPERATURE: 97.6 F | HEART RATE: 81 BPM

## 2019-11-08 DIAGNOSIS — Z76.89 ENCOUNTER TO ESTABLISH CARE: ICD-10-CM

## 2019-11-08 DIAGNOSIS — E11.9 NON-INSULIN DEPENDENT TYPE 2 DIABETES MELLITUS (HCC): ICD-10-CM

## 2019-11-08 DIAGNOSIS — E78.2 MIXED HYPERLIPIDEMIA: ICD-10-CM

## 2019-11-08 DIAGNOSIS — N18.30 CHRONIC KIDNEY DISEASE, STAGE III (MODERATE) (HCC): ICD-10-CM

## 2019-11-08 DIAGNOSIS — I10 ESSENTIAL HYPERTENSION: ICD-10-CM

## 2019-11-08 DIAGNOSIS — Z00.00 MEDICARE ANNUAL WELLNESS VISIT, SUBSEQUENT: Primary | ICD-10-CM

## 2019-11-08 DIAGNOSIS — F32.89 OTHER DEPRESSION: ICD-10-CM

## 2019-11-08 PROCEDURE — G0439 PPPS, SUBSEQ VISIT: HCPCS | Performed by: NURSE PRACTITIONER

## 2019-11-08 PROCEDURE — 96160 PT-FOCUSED HLTH RISK ASSMT: CPT | Performed by: NURSE PRACTITIONER

## 2019-11-08 NOTE — PROGRESS NOTES
The ABCs of the Annual Wellness Visit  Subsequent Medicare Wellness Visit    Chief Complaint   Patient presents with   • Medicare Wellness-subsequent     Pt presents here today for a medicare wellness visit and to establish care.       Subjective   History of Present Illness:  Navi Kinney is a 78 y.o. female who presents for a Subsequent Medicare Wellness Visit.    HEALTH RISK ASSESSMENT    Recent Hospitalizations:  No hospitalization(s) within the last year.    Current Medical Providers:  Patient Care Team:  Lulu Aleman APRN as PCP - General (Nurse Practitioner)  Nereida Henson MD as Consulting Physician (Cardiology)  Negro Gusman MD as Consulting Physician (Nephrology)  Yoni Craig MD as Consulting Physician (Dermatology)    Smoking Status:  Social History     Tobacco Use   Smoking Status Former Smoker   • Packs/day: 1.00   • Years: 25.00   • Pack years: 25.00   • Types: Cigarettes   • Last attempt to quit:    • Years since quittin.8   Smokeless Tobacco Never Used       Alcohol Consumption:  Social History     Substance and Sexual Activity   Alcohol Use No       Depression Screen:   PHQ-2/PHQ-9 Depression Screening 2019   Little interest or pleasure in doing things 0   Feeling down, depressed, or hopeless 0   Trouble falling or staying asleep, or sleeping too much -   Feeling tired or having little energy -   Poor appetite or overeating -   Feeling bad about yourself - or that you are a failure or have let yourself or your family down -   Trouble concentrating on things, such as reading the newspaper or watching television -   Moving or speaking so slowly that other people could have noticed. Or the opposite - being so fidgety or restless that you have been moving around a lot more than usual -   Thoughts that you would be better off dead, or of hurting yourself in some way -   Total Score 0   If you checked off any problems, how difficult have these problems made it  for you to do your work, take care of things at home, or get along with other people? -       Fall Risk Screen:  SALVATORE Fall Risk Assessment was completed, and patient is at LOW risk for falls.Assessment completed on:11/8/2019    Health Habits and Functional and Cognitive Screening:  Functional & Cognitive Status 11/8/2019   Do you have difficulty preparing food and eating? No   Do you have difficulty bathing yourself, getting dressed or grooming yourself? No   Do you have difficulty using the toilet? No   Do you have difficulty moving around from place to place? No   Do you have trouble with steps or getting out of a bed or a chair? No   Current Diet Limited Junk Food   Dental Exam Up to date   Eye Exam Up to date   Exercise (times per week) 0 times per week   Current Exercise Activities Include None   Do you need help using the phone?  No   Are you deaf or do you have serious difficulty hearing?  Yes   Do you need help with transportation? No   Do you need help shopping? No   Do you need help preparing meals?  No   Do you need help with housework?  No   Do you need help with laundry? No   Do you need help taking your medications? No   Do you need help managing money? No   Do you ever drive or ride in a car without wearing a seat belt? No   Have you felt unusual stress, anger or loneliness in the last month? No   Who do you live with? Alone   If you need help, do you have trouble finding someone available to you? No   Have you been bothered in the last four weeks by sexual problems? No   Do you have difficulty concentrating, remembering or making decisions? Yes         Does the patient have evidence of cognitive impairment? No    Asprin use counseling:Taking ASA appropriately as indicated    Age-appropriate Screening Schedule:  Refer to the list below for future screening recommendations based on patient's age, sex and/or medical conditions. Orders for these recommended tests are listed in the plan section. The  patient has been provided with a written plan.    Health Maintenance   Topic Date Due   • ZOSTER VACCINE (1 of 2) 12/27/1991   • LIPID PANEL  02/01/2020   • HEMOGLOBIN A1C  05/08/2020   • DIABETIC EYE EXAM  10/23/2020   • URINE MICROALBUMIN  11/08/2020   • MAMMOGRAM  01/04/2021   • COLONOSCOPY  01/08/2024   • TDAP/TD VACCINES (2 - Td) 05/04/2027   • INFLUENZA VACCINE  Completed   • PNEUMOCOCCAL VACCINES (65+ LOW/MEDIUM RISK)  Completed          The following portions of the patient's history were reviewed and updated as appropriate: allergies, current medications, past family history, past medical history, past social history, past surgical history and problem list.    Outpatient Medications Prior to Visit   Medication Sig Dispense Refill   • amLODIPine-benazepril (LOTREL 2.5-10) 2.5-10 MG per capsule Take 1 capsule by mouth Every Night. 90 capsule 1   • aspirin 81 MG tablet Take 81 mg by mouth Daily.     • atorvastatin (LIPITOR) 10 MG tablet Take 1 tablet by mouth Daily. 90 tablet 1   • chlorthalidone (HYGROTON) 25 MG tablet Take 1 tablet by mouth Daily. 90 tablet 2   • Cyanocobalamin (VITAMIN B-12 PO) Take 500 mcg by mouth Every Night.     • DULoxetine (CYMBALTA) 30 MG capsule Take 1 capsule by mouth Every Night. 90 capsule 2   • isosorbide mononitrate (IMDUR) 30 MG 24 hr tablet Take 1 tablet by mouth Every Night. 90 tablet 2   • metoprolol succinate XL (TOPROL-XL) 50 MG 24 hr tablet TAKE ONE TABLET BY MOUTH NIGHTLY 90 tablet 0   • nitroglycerin (NITROSTAT) 0.4 MG SL tablet Place 1 tablet under the tongue Every 5 (Five) Minutes As Needed for Chest Pain. 90 tablet 3   • dicyclomine (BENTYL) 20 MG tablet Take 20 mg by mouth Every 6 (Six) Hours As Needed (diarrhea and cramping).     • doxycycline (VIBRAMYCIN) 100 MG capsule Take 1 capsule by mouth 2 (Two) Times a Day. 20 capsule 0     No facility-administered medications prior to visit.        Patient Active Problem List   Diagnosis   • Acute upper respiratory  infection   • Ankle pain   • Arthralgia of hip   • Atherosclerosis of coronary artery   • Chronic kidney disease, stage III (moderate) (CMS/[facility])   • Depression   • Non-insulin dependent type 2 diabetes mellitus (CMS/[facility])   • Dyspnea on exertion   • Foot pain   • Hypertension   • Hyperlipidemia   • Degeneration of intervertebral disc of lumbar region   • Lumbar radiculopathy   • Spinal stenosis of lumbar region   • Osteoarthritis of hip   • Palpitations   • Seborrheic keratosis   • Visit for screening mammogram   • Torus palatinus   • Medicare annual wellness visit, subsequent   • Osteoarthritis of knee   • Acute pain of both shoulders/ since 7/2/18   • Right elbow pain       Advanced Care Planning:  Patient has an advance directive - a copy has been provided and is visible in patient header    Review of Systems   Constitutional: Negative for activity change, chills, fatigue, fever and unexpected weight change.   HENT: Negative for congestion, hearing loss, postnasal drip, sinus pressure, sinus pain, sneezing, sore throat and tinnitus.    Eyes: Negative for photophobia, pain and visual disturbance.   Respiratory: Negative for cough, chest tightness, shortness of breath and wheezing.    Cardiovascular: Negative for chest pain, palpitations and leg swelling.   Gastrointestinal: Negative for abdominal distention, abdominal pain, constipation, diarrhea, nausea and vomiting.   Endocrine: Negative for polydipsia, polyphagia and polyuria.   Genitourinary: Negative for dysuria, frequency, hematuria and urgency.   Neurological: Negative for dizziness, weakness, numbness and headaches.   All other systems reviewed and are negative.      Compared to one year ago, the patient feels her physical health is the same.  Compared to one year ago, the patient feels her mental health is the same.    Reviewed chart for potential of high risk medication in the elderly: yes  Reviewed chart for potential of harmful drug interactions in the  "elderly:yes    Objective         Vitals:    11/08/19 1337   BP: 128/78   BP Location: Right arm   Patient Position: Sitting   Cuff Size: Adult   Pulse: 81   Resp: 16   Temp: 97.6 °F (36.4 °C)   TempSrc: Oral   SpO2: 95%   Weight: 81.6 kg (180 lb)   Height: 162.6 cm (64\")       Body mass index is 30.9 kg/m².  Discussed the patient's BMI with her. The BMI is in the acceptable range.    Physical Exam   Constitutional: She is oriented to person, place, and time. She appears well-developed and well-nourished. No distress.   HENT:   Head: Normocephalic and atraumatic.   Eyes: EOM are normal. Pupils are equal, round, and reactive to light.   Neck: Normal range of motion. Neck supple.   Cardiovascular: Normal rate and regular rhythm.   Pulmonary/Chest: Effort normal and breath sounds normal.   Abdominal: Soft. Bowel sounds are normal.   Musculoskeletal: Normal range of motion.   Neurological: She is alert and oriented to person, place, and time.   Skin: Skin is warm and dry. Capillary refill takes less than 2 seconds. She is not diaphoretic.   Psychiatric: She has a normal mood and affect. Her behavior is normal. Judgment and thought content normal.   Nursing note and vitals reviewed.            Assessment/Plan   Medicare Risks and Personalized Health Plan  CMS Preventative Services Quick Reference  Cardiovascular risk  Depression/Dysphoria  Fall Risk  Immunizations Discussed/Encouraged (specific immunizations; Influenza and Shingrix )    The above risks/problems have been discussed with the patient.  Pertinent information has been shared with the patient in the After Visit Summary.  Follow up plans and orders are seen below in the Assessment/Plan Section.    Diagnoses and all orders for this visit:    1. Medicare annual wellness visit, subsequent (Primary)    2. Encounter to establish care    3. Essential hypertension  -     CBC & Differential  -     Comprehensive metabolic panel  -     Urinalysis With Culture If Indicated " "- Urine, Clean Catch    4. Mixed hyperlipidemia  -     Lipid panel    5. Non-insulin dependent type 2 diabetes mellitus (CMS/[facility])  -     MicroAlbumin, Urine, Random - Urine, Clean Catch  -     Hemoglobin A1c    6. Chronic kidney disease, stage III (moderate) (CMS/[facility])    7. Other depression      Follow Up:  No Follow-up on file.     An After Visit Summary and PPPS were given to the patient.     -Medicare wellness visit, subsequent: Annual Medicare wellness visit performed.  She has had her flu shot for this year already.  We discussed the Shingrix vaccine and she believes she has had the first dose but not the second.  She will call back with the dates of her vaccines so that we can add them to her record.    -Hypertension: Well controlled today at 128/78.  Continue amlodipine with benazepril 2.5-10 mg daily, chlorthalidone 25 mg daily, Imdur 30 mg daily Toprol-XL 50 mg daily.    -Type 2 diabetes: She reports that she has never taken medication for this and that she has \"always been on the borderline.\"  Last A1c was in the diabetic range at 6.7 on 7/9/2018.  We will recheck an A1c today and possibly initiate medication therapy if needed based on labs.    -CKD stage III: She follows with Dr. Gusman, nephrology.  Routine follow-ups and we will check a creatinine today.    -Depression: Stable with Cymbalta, 60 mg daily.  No SI or HI.  Continue current therapy.    -We will contact patient with results of her labs and any further recommendations.  Follow-up PRN and I will see her back in 6 months for recheck of chronic conditions/healthcare maintenance.        "

## 2019-11-10 LAB
ALBUMIN SERPL-MCNC: 4.6 G/DL (ref 3.5–5.2)
ALBUMIN/GLOB SERPL: 2 G/DL
ALP SERPL-CCNC: 105 U/L (ref 39–117)
ALT SERPL-CCNC: 23 U/L (ref 1–33)
APPEARANCE UR: ABNORMAL
AST SERPL-CCNC: 22 U/L (ref 1–32)
BACTERIA #/AREA URNS HPF: ABNORMAL /HPF
BACTERIA UR CULT: NORMAL
BACTERIA UR CULT: NORMAL
BASOPHILS # BLD AUTO: 0.09 10*3/MM3 (ref 0–0.2)
BASOPHILS NFR BLD AUTO: 0.9 % (ref 0–1.5)
BILIRUB SERPL-MCNC: 0.3 MG/DL (ref 0.2–1.2)
BILIRUB UR QL STRIP: NEGATIVE
BUN SERPL-MCNC: 21 MG/DL (ref 8–23)
BUN/CREAT SERPL: 19.6 (ref 7–25)
CALCIUM SERPL-MCNC: 9.4 MG/DL (ref 8.6–10.5)
CHLORIDE SERPL-SCNC: 97 MMOL/L (ref 98–107)
CHOLEST SERPL-MCNC: 171 MG/DL (ref 0–200)
CO2 SERPL-SCNC: 27.5 MMOL/L (ref 22–29)
COLOR UR: YELLOW
CREAT SERPL-MCNC: 1.07 MG/DL (ref 0.57–1)
EOSINOPHIL # BLD AUTO: 0.16 10*3/MM3 (ref 0–0.4)
EOSINOPHIL NFR BLD AUTO: 1.6 % (ref 0.3–6.2)
EPI CELLS #/AREA URNS HPF: ABNORMAL /HPF
ERYTHROCYTE [DISTWIDTH] IN BLOOD BY AUTOMATED COUNT: 12.6 % (ref 12.3–15.4)
GLOBULIN SER CALC-MCNC: 2.3 GM/DL
GLUCOSE SERPL-MCNC: 126 MG/DL (ref 65–99)
GLUCOSE UR QL: ABNORMAL
HBA1C MFR BLD: 8.7 % (ref 4.8–5.6)
HCT VFR BLD AUTO: 39.4 % (ref 34–46.6)
HDLC SERPL-MCNC: 43 MG/DL (ref 40–60)
HGB BLD-MCNC: 13.4 G/DL (ref 12–15.9)
HGB UR QL STRIP: ABNORMAL
IMM GRANULOCYTES # BLD AUTO: 0.06 10*3/MM3 (ref 0–0.05)
IMM GRANULOCYTES NFR BLD AUTO: 0.6 % (ref 0–0.5)
KETONES UR QL STRIP: NEGATIVE
LDLC SERPL CALC-MCNC: 84 MG/DL (ref 0–100)
LEUKOCYTE ESTERASE UR QL STRIP: ABNORMAL
LYMPHOCYTES # BLD AUTO: 2.38 10*3/MM3 (ref 0.7–3.1)
LYMPHOCYTES NFR BLD AUTO: 24 % (ref 19.6–45.3)
MCH RBC QN AUTO: 31.2 PG (ref 26.6–33)
MCHC RBC AUTO-ENTMCNC: 34 G/DL (ref 31.5–35.7)
MCV RBC AUTO: 91.6 FL (ref 79–97)
MICRO URNS: ABNORMAL
MICROALBUMIN UR-MCNC: 55.1 UG/ML
MONOCYTES # BLD AUTO: 0.69 10*3/MM3 (ref 0.1–0.9)
MONOCYTES NFR BLD AUTO: 7 % (ref 5–12)
MUCOUS THREADS URNS QL MICRO: PRESENT /HPF
NEUTROPHILS # BLD AUTO: 6.54 10*3/MM3 (ref 1.7–7)
NEUTROPHILS NFR BLD AUTO: 65.9 % (ref 42.7–76)
NITRITE UR QL STRIP: NEGATIVE
NRBC BLD AUTO-RTO: 0 /100 WBC (ref 0–0.2)
PH UR STRIP: 5.5 [PH] (ref 5–7.5)
PLATELET # BLD AUTO: 271 10*3/MM3 (ref 140–450)
POTASSIUM SERPL-SCNC: 3.9 MMOL/L (ref 3.5–5.2)
PROT SERPL-MCNC: 6.9 G/DL (ref 6–8.5)
PROT UR QL STRIP: ABNORMAL
RBC # BLD AUTO: 4.3 10*6/MM3 (ref 3.77–5.28)
RBC #/AREA URNS HPF: ABNORMAL /HPF
SODIUM SERPL-SCNC: 141 MMOL/L (ref 136–145)
SP GR UR: 1.02 (ref 1–1.03)
TRIGL SERPL-MCNC: 221 MG/DL (ref 0–150)
URINALYSIS REFLEX: ABNORMAL
UROBILINOGEN UR STRIP-MCNC: 0.2 MG/DL (ref 0.2–1)
VLDLC SERPL CALC-MCNC: 44.2 MG/DL
WBC # BLD AUTO: 9.92 10*3/MM3 (ref 3.4–10.8)
WBC #/AREA URNS HPF: >30 /HPF

## 2019-11-11 DIAGNOSIS — E11.9 NON-INSULIN DEPENDENT TYPE 2 DIABETES MELLITUS (HCC): ICD-10-CM

## 2019-11-11 DIAGNOSIS — N39.0 URINARY TRACT INFECTION WITHOUT HEMATURIA, SITE UNSPECIFIED: Primary | ICD-10-CM

## 2019-11-11 RX ORDER — NITROFURANTOIN 25; 75 MG/1; MG/1
100 CAPSULE ORAL 2 TIMES DAILY
Qty: 14 CAPSULE | Refills: 0 | Status: SHIPPED | OUTPATIENT
Start: 2019-11-11 | End: 2019-11-18

## 2019-11-11 RX ORDER — METFORMIN HYDROCHLORIDE 500 MG/1
500 TABLET, EXTENDED RELEASE ORAL
Qty: 30 TABLET | Refills: 2 | Status: SHIPPED | OUTPATIENT
Start: 2019-11-11 | End: 2020-01-20

## 2019-11-11 NOTE — PROGRESS NOTES
Please notify patient that her blood count is stable and kidney function looks stable but I do want her to follow-up routinely with her nephrologist.  Cholesterol came back stable except for triglycerides which are a bit high at 221.  Please decrease carbs and sugars which will help bring these down.  Urine is showing multiple bacteria and I am going to go ahead and treat her with Macrobid twice daily x7 days, please complete in full and come back in 2 weeks to leave a repeat urine culture which I have ordered.  A1c came back very elevated at 8.7 which is much higher than it was at last check of 6.7.  We need to go ahead and treat her diabetes.  I have ordered metformin extended release 500 mg daily.  Metformin can cause some GI upset for 1 to 2 weeks, while the body gets used to the medication but this will subside.  It is important that we treat her diabetes due to this high A1c level.  I want her to come back in 3 months to see me instead of 6.  Please make a 3-month follow-up appointment.

## 2019-11-12 ENCOUNTER — TELEPHONE (OUTPATIENT)
Dept: INTERNAL MEDICINE | Facility: CLINIC | Age: 79
End: 2019-11-12

## 2019-11-12 NOTE — TELEPHONE ENCOUNTER
----- Message from CORNELL Fong sent at 11/11/2019  1:11 PM EST -----  Please notify patient that her blood count is stable and kidney function looks stable but I do want her to follow-up routinely with her nephrologist.  Cholesterol came back stable except for triglycerides which are a bit high at 221.  Please decrease   carbs and sugars which will help bring these down.  Urine is showing multiple bacteria and I am going to go ahead and treat her with Macrobid twice daily x7 days, please complete in full and come back in 2 weeks to leave a repeat urine culture which   I have ordered.  A1c came back very elevated at 8.7 which is much higher than it was at last check of 6.7.  We need to go ahead and treat her diabetes.  I have ordered metformin extended release 500 mg daily.  Metformin can cause some GI upset for 1   to 2 weeks, while the body gets used to the medication but this will subside.  It is important that we treat her diabetes due to this high A1c level.  I want her to come back in 3 months to see me instead of 6.  Please make a 3-month follow-up appoi  ntment.

## 2019-11-25 ENCOUNTER — RESULTS ENCOUNTER (OUTPATIENT)
Dept: INTERNAL MEDICINE | Facility: CLINIC | Age: 79
End: 2019-11-25

## 2019-11-25 DIAGNOSIS — N39.0 URINARY TRACT INFECTION WITHOUT HEMATURIA, SITE UNSPECIFIED: ICD-10-CM

## 2019-12-02 RX ORDER — METOPROLOL SUCCINATE 50 MG/1
TABLET, EXTENDED RELEASE ORAL
Qty: 90 TABLET | Refills: 0 | Status: SHIPPED | OUTPATIENT
Start: 2019-12-02 | End: 2020-02-25

## 2019-12-05 ENCOUNTER — TRANSCRIBE ORDERS (OUTPATIENT)
Dept: ADMINISTRATIVE | Facility: HOSPITAL | Age: 79
End: 2019-12-05

## 2019-12-05 DIAGNOSIS — Z12.39 SCREENING BREAST EXAMINATION: Primary | ICD-10-CM

## 2019-12-07 LAB
BACTERIA UR CULT: NORMAL
BACTERIA UR CULT: NORMAL

## 2019-12-09 RX ORDER — AMLODIPINE BESYLATE AND BENAZEPRIL HYDROCHLORIDE 2.5; 1 MG/1; MG/1
1 CAPSULE ORAL NIGHTLY
Qty: 90 CAPSULE | Refills: 1 | Status: SHIPPED | OUTPATIENT
Start: 2019-12-09 | End: 2020-02-25

## 2020-01-10 ENCOUNTER — HOSPITAL ENCOUNTER (OUTPATIENT)
Dept: MAMMOGRAPHY | Facility: HOSPITAL | Age: 80
Discharge: HOME | End: 2020-01-10
Admitting: NURSE PRACTITIONER

## 2020-01-10 DIAGNOSIS — Z12.39 SCREENING BREAST EXAMINATION: ICD-10-CM

## 2020-01-10 PROCEDURE — 77063 BREAST TOMOSYNTHESIS BI: CPT

## 2020-01-10 PROCEDURE — 77067 SCR MAMMO BI INCL CAD: CPT

## 2020-01-13 NOTE — PROGRESS NOTES
Please notify patient that her mammogram came back stable showing no evidence of malignancy or significant change in either breast and we will continue with routine follow-up mammography.

## 2020-01-20 DIAGNOSIS — E11.9 NON-INSULIN DEPENDENT TYPE 2 DIABETES MELLITUS (HCC): ICD-10-CM

## 2020-01-20 RX ORDER — METFORMIN HYDROCHLORIDE 500 MG/1
TABLET, EXTENDED RELEASE ORAL
Qty: 30 TABLET | Refills: 2 | Status: SHIPPED | OUTPATIENT
Start: 2020-01-20 | End: 2020-04-21

## 2020-02-11 ENCOUNTER — LAB (OUTPATIENT)
Dept: INTERNAL MEDICINE | Facility: CLINIC | Age: 80
End: 2020-02-11

## 2020-02-11 ENCOUNTER — TELEPHONE (OUTPATIENT)
Dept: INTERNAL MEDICINE | Facility: CLINIC | Age: 80
End: 2020-02-11

## 2020-02-11 DIAGNOSIS — N39.0 URINARY TRACT INFECTION WITHOUT HEMATURIA, SITE UNSPECIFIED: ICD-10-CM

## 2020-02-11 DIAGNOSIS — N39.0 URINARY TRACT INFECTION WITHOUT HEMATURIA, SITE UNSPECIFIED: Primary | ICD-10-CM

## 2020-02-11 NOTE — TELEPHONE ENCOUNTER
Patient called wanting to do a urine sample for a possible urine infection. She states that she has the following symptoms feeling and the color darker. She has been having these symptoms for about 2 weeks. She can be reached at [phone] to schedule that appointment. Patient is free this afternoon, late Thursday or Friday.

## 2020-02-16 LAB
APPEARANCE UR: ABNORMAL
BACTERIA #/AREA URNS HPF: ABNORMAL /HPF
BACTERIA UR CULT: ABNORMAL
BACTERIA UR CULT: ABNORMAL
BILIRUB UR QL STRIP: NEGATIVE
CASTS URNS MICRO: ABNORMAL
COLOR UR: YELLOW
EPI CELLS #/AREA URNS HPF: ABNORMAL /HPF
GLUCOSE UR QL: NEGATIVE
HGB UR QL STRIP: NEGATIVE
KETONES UR QL STRIP: NEGATIVE
LEUKOCYTE ESTERASE UR QL STRIP: ABNORMAL
Lab: NORMAL
NITRITE UR QL STRIP: NEGATIVE
OTHER ANTIBIOTIC SUSC ISLT: ABNORMAL
PH UR STRIP: 5.5 [PH] (ref 5–8)
PROT UR QL STRIP: ABNORMAL
RBC #/AREA URNS HPF: ABNORMAL /HPF
SP GR UR: 1.02 (ref 1–1.03)
UROBILINOGEN UR STRIP-MCNC: ABNORMAL MG/DL
WBC #/AREA URNS HPF: ABNORMAL /HPF

## 2020-02-17 ENCOUNTER — TELEPHONE (OUTPATIENT)
Dept: INTERNAL MEDICINE | Facility: CLINIC | Age: 80
End: 2020-02-17

## 2020-02-17 DIAGNOSIS — N30.00 ACUTE CYSTITIS WITHOUT HEMATURIA: Primary | ICD-10-CM

## 2020-02-17 DIAGNOSIS — N39.0 URINARY TRACT INFECTION WITHOUT HEMATURIA, SITE UNSPECIFIED: Primary | ICD-10-CM

## 2020-02-17 RX ORDER — AMOXICILLIN AND CLAVULANATE POTASSIUM 875; 125 MG/1; MG/1
1 TABLET, FILM COATED ORAL 2 TIMES DAILY
Qty: 14 TABLET | Refills: 0 | Status: SHIPPED | OUTPATIENT
Start: 2020-02-17 | End: 2020-02-25

## 2020-02-17 NOTE — PROGRESS NOTES
Please notify patient that her urine culture is showing UTI and I have sent Augmentin twice daily x7 days to her pharmacy.  Please complete in full and I ordered a repeat urine culture for 2 weeks, please make lab appointment.

## 2020-02-17 NOTE — TELEPHONE ENCOUNTER
----- Message from CORNELL Fong sent at 2/17/2020  9:16 AM EST -----  Please notify patient that her urine culture is showing UTI and I have sent Augmentin twice daily x7 days to her pharmacy.  Please complete in full and I ordered a repeat urine culture for 2 weeks, please make lab appointment.

## 2020-02-25 ENCOUNTER — OFFICE VISIT (OUTPATIENT)
Dept: INTERNAL MEDICINE | Facility: CLINIC | Age: 80
End: 2020-02-25

## 2020-02-25 VITALS
RESPIRATION RATE: 16 BRPM | HEART RATE: 91 BPM | TEMPERATURE: 98.3 F | HEIGHT: 64 IN | BODY MASS INDEX: 29.78 KG/M2 | WEIGHT: 174.4 LBS | OXYGEN SATURATION: 94 % | DIASTOLIC BLOOD PRESSURE: 73 MMHG | SYSTOLIC BLOOD PRESSURE: 114 MMHG

## 2020-02-25 DIAGNOSIS — N39.0 URINARY TRACT INFECTION WITHOUT HEMATURIA, SITE UNSPECIFIED: ICD-10-CM

## 2020-02-25 DIAGNOSIS — I10 ESSENTIAL HYPERTENSION: Primary | ICD-10-CM

## 2020-02-25 DIAGNOSIS — E78.2 MIXED HYPERLIPIDEMIA: ICD-10-CM

## 2020-02-25 DIAGNOSIS — E11.9 NON-INSULIN DEPENDENT TYPE 2 DIABETES MELLITUS (HCC): ICD-10-CM

## 2020-02-25 DIAGNOSIS — I25.10 ATHEROSCLEROSIS OF NATIVE CORONARY ARTERY OF NATIVE HEART WITHOUT ANGINA PECTORIS: ICD-10-CM

## 2020-02-25 DIAGNOSIS — N18.30 CHRONIC KIDNEY DISEASE, STAGE III (MODERATE) (HCC): ICD-10-CM

## 2020-02-25 LAB
ALBUMIN SERPL-MCNC: 4.1 G/DL (ref 3.5–5.2)
ALBUMIN/GLOB SERPL: 1.7 G/DL
ALP SERPL-CCNC: 88 U/L (ref 39–117)
ALT SERPL-CCNC: 22 U/L (ref 1–33)
AST SERPL-CCNC: 18 U/L (ref 1–32)
BILIRUB SERPL-MCNC: 0.4 MG/DL (ref 0.2–1.2)
BUN SERPL-MCNC: 18 MG/DL (ref 8–23)
BUN/CREAT SERPL: 17.6 (ref 7–25)
CALCIUM SERPL-MCNC: 9.9 MG/DL (ref 8.6–10.5)
CHLORIDE SERPL-SCNC: 97 MMOL/L (ref 98–107)
CO2 SERPL-SCNC: 26.4 MMOL/L (ref 22–29)
CREAT SERPL-MCNC: 1.02 MG/DL (ref 0.57–1)
GLOBULIN SER CALC-MCNC: 2.4 GM/DL
GLUCOSE SERPL-MCNC: 219 MG/DL (ref 65–99)
HBA1C MFR BLD: 7.5 % (ref 4.8–5.6)
POTASSIUM SERPL-SCNC: 4.3 MMOL/L (ref 3.5–5.2)
PROT SERPL-MCNC: 6.5 G/DL (ref 6–8.5)
SODIUM SERPL-SCNC: 138 MMOL/L (ref 136–145)

## 2020-02-25 PROCEDURE — 99214 OFFICE O/P EST MOD 30 MIN: CPT | Performed by: NURSE PRACTITIONER

## 2020-02-25 RX ORDER — MONTELUKAST SODIUM 4 MG/1
2 TABLET, CHEWABLE ORAL
COMMUNITY
Start: 2020-01-09 | End: 2022-07-25 | Stop reason: SDUPTHER

## 2020-02-25 RX ORDER — LOPERAMIDE HYDROCHLORIDE 2 MG/1
2 TABLET ORAL
COMMUNITY
Start: 2020-01-09 | End: 2020-07-15 | Stop reason: HOSPADM

## 2020-02-25 RX ORDER — METOPROLOL SUCCINATE 50 MG/1
TABLET, EXTENDED RELEASE ORAL
Qty: 90 TABLET | Refills: 0 | Status: SHIPPED | OUTPATIENT
Start: 2020-02-25 | End: 2020-05-26

## 2020-02-25 RX ORDER — AMLODIPINE BESYLATE AND BENAZEPRIL HYDROCHLORIDE 2.5; 1 MG/1; MG/1
1 CAPSULE ORAL NIGHTLY
Qty: 90 CAPSULE | Refills: 1 | Status: SHIPPED | OUTPATIENT
Start: 2020-02-25 | End: 2020-09-07

## 2020-02-25 NOTE — PROGRESS NOTES
Subjective   Navi Kinney is a 79 y.o. female.     CC: 3-month follow-up, type 2 diabetes, hyperlipidemia, hypertension, CKD stage III    Patient presents for 3-month follow-up.  This is a 71-year-old female.  I last saw her in November 2019.    She has type 2 diabetes.  Last A1c was 6.7.  Previously had not been on medication.  We initiated metformin  mg daily.  She reports that she has tolerated this well with no side effects and endorses excellent compliance with the medication.    For hyperlipidemia she takes atorvastatin 10 mg daily.  She endorses excellent compliance and toleration of this medication.    For hypertension she takes Imdur 30 mg daily, Toprol-XL 50 mg daily and chlorthalidone 25 mg daily.  She endorses excellent compliance and toleration of these medications and blood pressure is well controlled today at 114/73.    She has CKD stage III and follows with Dr. Gusman, nephrology routinely.  She has an appointment at the end of this week for follow-up.    She has coronary artery disease and follows with [place] [facility].  She was last seen in January 2020.  She endorses no cardiac symptoms at this time.    She recently finished antibiotics for UTI and would like a urine culture rechecked today to ensure resolution.  She is having no fever, chills, shortness of breath, chest discomfort or any further dysuria or urinary tract symptoms since finishing the antibiotic.    She denies development of any other new issues today.       The following portions of the patient's history were reviewed and updated as appropriate: allergies, current medications, past family history, past medical history, past social history, past surgical history and problem list.    Review of Systems   Constitutional: Negative for activity change, chills, fatigue, fever, unexpected weight gain and unexpected weight loss.   HENT: Negative for congestion, hearing loss, postnasal drip, sinus pressure, sneezing, sore throat,  "swollen glands and tinnitus.    Eyes: Negative for photophobia, pain and visual disturbance.   Respiratory: Negative for cough, chest tightness, shortness of breath and wheezing.    Cardiovascular: Negative for chest pain, palpitations and leg swelling.   Gastrointestinal: Negative for abdominal distention, abdominal pain, constipation, diarrhea, nausea and vomiting.   Endocrine: Negative for polydipsia, polyphagia and polyuria.   Genitourinary: Negative for dysuria, frequency, hematuria and urgency.   Neurological: Negative for dizziness, weakness, numbness and headache.   All other systems reviewed and are negative.      Objective    /73 (BP Location: Left arm, Patient Position: Sitting, Cuff Size: Adult)   Pulse 91   Temp 98.3 °F (36.8 °C) (Oral)   Resp 16   Ht 162.6 cm (64\")   Wt 79.1 kg (174 lb 6.4 oz)   LMP  (LMP Unknown)   SpO2 94%   BMI 29.94 kg/m²     Physical Exam   Constitutional: She is oriented to person, place, and time. She appears well-developed and well-nourished. No distress.   HENT:   Head: Normocephalic and atraumatic.   Eyes: Pupils are equal, round, and reactive to light. EOM are normal.   Neck: Normal range of motion. Neck supple.   Cardiovascular: Normal rate, regular rhythm, normal heart sounds and intact distal pulses. Exam reveals no gallop and no friction rub.   No murmur heard.  No peripheral edema.  Posterior tib pulses 2+ and equal bilaterally.   Pulmonary/Chest: Effort normal and breath sounds normal. No stridor. No respiratory distress. She has no wheezes. She has no rales. She exhibits no tenderness.   Lungs are CTA bilaterally   Abdominal: Soft. Bowel sounds are normal. She exhibits no distension. There is no tenderness.   Musculoskeletal: Normal range of motion.   Neurological: She is alert and oriented to person, place, and time.   Skin: Skin is warm and dry. Capillary refill takes less than 2 seconds. She is not diaphoretic.   Psychiatric: She has a normal mood and " affect. Her behavior is normal. Judgment and thought content normal.   Nursing note and vitals reviewed.    Current outpatient and discharge medications have been reconciled for the patient.  Reviewed by: CORNELL Fong      Assessment/Plan   Navi was seen today for follow-up.    Diagnoses and all orders for this visit:    Essential hypertension  -     Comprehensive metabolic panel    Mixed hyperlipidemia    Atherosclerosis of native coronary artery of native heart without angina pectoris    Chronic kidney disease, stage III (moderate) (CMS/[facility])  -     Comprehensive metabolic panel    Non-insulin dependent type 2 diabetes mellitus (CMS/[facility])  -     Comprehensive metabolic panel  -     Hemoglobin A1c    Urinary tract infection without hematuria, site unspecified  -     Urine Culture - Urine, Urine, Clean Catch      -Hypertension: Very well controlled today at 114/73.  Continue chlorthalidone, Imdur and Toprol-XL.  Periodic home monitoring is recommended.    -Hyperlipidemia: Continue atorvastatin 10 mg daily.  We will recheck a lipid panel at her next follow-up.    -Coronary artery atherosclerosis: Following with cardiology routinely.  Continue current therapy as directed by cardiology with routine follow-ups.  No symptoms today.    -Type 2 diabetes: Continue metformin  mg daily.  We will recheck an A1c today and adjust therapy if needed based on labs.    -UTI: She has finished antibiotics for her recent Klebsiella UTI.  We will recheck a urine culture today.  She is asymptomatic at this time.    -We will contact patient with results of her labs and any further recommendations.  Follow-up PRN and I will see her back in 6 months for recheck of chronic conditions/routine health maintenance.

## 2020-02-26 NOTE — PROGRESS NOTES
Please notify patient that metabolic panel looks stable except for blood sugar which was high at 219. A1C has decreased from 8.7 to 7.5. We will continue with the current dosing of Metformin but continue to watch intake of carbs and sugars/concentrated sweets to help further decrease the A1C. We will recheck it at next follow up. Please let me know of any questions or concerns.

## 2020-02-27 LAB
BACTERIA UR CULT: NORMAL
BACTERIA UR CULT: NORMAL

## 2020-04-21 DIAGNOSIS — E11.9 NON-INSULIN DEPENDENT TYPE 2 DIABETES MELLITUS (HCC): ICD-10-CM

## 2020-04-21 RX ORDER — METFORMIN HYDROCHLORIDE 500 MG/1
TABLET, EXTENDED RELEASE ORAL
Qty: 30 TABLET | Refills: 2 | Status: SHIPPED | OUTPATIENT
Start: 2020-04-21 | End: 2020-07-27

## 2020-05-04 RX ORDER — CHLORTHALIDONE 25 MG/1
TABLET ORAL
Qty: 90 TABLET | Refills: 2 | Status: SHIPPED | OUTPATIENT
Start: 2020-05-04 | End: 2021-02-08

## 2020-05-26 RX ORDER — METOPROLOL SUCCINATE 50 MG/1
TABLET, EXTENDED RELEASE ORAL
Qty: 90 TABLET | Refills: 0 | Status: SHIPPED | OUTPATIENT
Start: 2020-05-26 | End: 2020-08-26

## 2020-07-08 ENCOUNTER — ANESTHESIA (OUTPATIENT)
Dept: PERIOP | Facility: HOSPITAL | Age: 80
End: 2020-07-08

## 2020-07-08 ENCOUNTER — APPOINTMENT (OUTPATIENT)
Dept: CT IMAGING | Facility: HOSPITAL | Age: 80
End: 2020-07-08

## 2020-07-08 ENCOUNTER — HOSPITAL ENCOUNTER (INPATIENT)
Facility: HOSPITAL | Age: 80
LOS: 7 days | Discharge: HOME | End: 2020-07-15
Attending: EMERGENCY MEDICINE | Admitting: SURGERY

## 2020-07-08 ENCOUNTER — TELEPHONE (OUTPATIENT)
Dept: INTERNAL MEDICINE | Facility: CLINIC | Age: 80
End: 2020-07-08

## 2020-07-08 ENCOUNTER — ANESTHESIA EVENT (OUTPATIENT)
Dept: PERIOP | Facility: HOSPITAL | Age: 80
End: 2020-07-08

## 2020-07-08 DIAGNOSIS — K35.30 ACUTE APPENDICITIS WITH LOCALIZED PERITONITIS, WITHOUT PERFORATION, ABSCESS, OR GANGRENE: Primary | ICD-10-CM

## 2020-07-08 DIAGNOSIS — K35.80 ACUTE APPENDICITIS: ICD-10-CM

## 2020-07-08 LAB
ALBUMIN SERPL-MCNC: 4.2 G/DL (ref 3.5–5.2)
ALBUMIN/GLOB SERPL: 1.4 G/DL
ALP SERPL-CCNC: 88 U/L (ref 39–117)
ALT SERPL W P-5'-P-CCNC: 25 U/L (ref 1–33)
ANION GAP SERPL CALCULATED.3IONS-SCNC: 14.5 MMOL/L (ref 5–15)
AST SERPL-CCNC: 16 U/L (ref 1–32)
BASOPHILS # BLD AUTO: 0.07 10*3/MM3 (ref 0–0.2)
BASOPHILS NFR BLD AUTO: 0.4 % (ref 0–1.5)
BILIRUB SERPL-MCNC: 0.4 MG/DL (ref 0–1.2)
BUN SERPL-MCNC: 20 MG/DL (ref 8–23)
BUN/CREAT SERPL: 24.1 (ref 7–25)
CALCIUM SPEC-SCNC: 9.9 MG/DL (ref 8.6–10.5)
CHLORIDE SERPL-SCNC: 95 MMOL/L (ref 98–107)
CO2 SERPL-SCNC: 22.5 MMOL/L (ref 22–29)
CREAT SERPL-MCNC: 0.83 MG/DL (ref 0.57–1)
DEPRECATED RDW RBC AUTO: 40.2 FL (ref 37–54)
EOSINOPHIL # BLD AUTO: 0.08 10*3/MM3 (ref 0–0.4)
EOSINOPHIL NFR BLD AUTO: 0.4 % (ref 0.3–6.2)
ERYTHROCYTE [DISTWIDTH] IN BLOOD BY AUTOMATED COUNT: 12.1 % (ref 12.3–15.4)
GFR SERPL CREATININE-BSD FRML MDRD: 66 ML/MIN/1.73
GLOBULIN UR ELPH-MCNC: 2.9 GM/DL
GLUCOSE BLDC GLUCOMTR-MCNC: 172 MG/DL (ref 70–130)
GLUCOSE BLDC GLUCOMTR-MCNC: 197 MG/DL (ref 70–130)
GLUCOSE BLDC GLUCOMTR-MCNC: 217 MG/DL (ref 70–130)
GLUCOSE SERPL-MCNC: 223 MG/DL (ref 65–99)
HCT VFR BLD AUTO: 39 % (ref 34–46.6)
HGB BLD-MCNC: 13.3 G/DL (ref 12–15.9)
HOLD SPECIMEN: NORMAL
HOLD SPECIMEN: NORMAL
IMM GRANULOCYTES # BLD AUTO: 0.14 10*3/MM3 (ref 0–0.05)
IMM GRANULOCYTES NFR BLD AUTO: 0.8 % (ref 0–0.5)
LIPASE SERPL-CCNC: 45 U/L (ref 13–60)
LYMPHOCYTES # BLD AUTO: 1.51 10*3/MM3 (ref 0.7–3.1)
LYMPHOCYTES NFR BLD AUTO: 8.5 % (ref 19.6–45.3)
MCH RBC QN AUTO: 30.9 PG (ref 26.6–33)
MCHC RBC AUTO-ENTMCNC: 34.1 G/DL (ref 31.5–35.7)
MCV RBC AUTO: 90.5 FL (ref 79–97)
MONOCYTES # BLD AUTO: 0.83 10*3/MM3 (ref 0.1–0.9)
MONOCYTES NFR BLD AUTO: 4.7 % (ref 5–12)
NEUTROPHILS NFR BLD AUTO: 15.16 10*3/MM3 (ref 1.7–7)
NEUTROPHILS NFR BLD AUTO: 85.2 % (ref 42.7–76)
NRBC BLD AUTO-RTO: 0 /100 WBC (ref 0–0.2)
PLATELET # BLD AUTO: 278 10*3/MM3 (ref 140–450)
PMV BLD AUTO: 10 FL (ref 6–12)
POTASSIUM SERPL-SCNC: 3.9 MMOL/L (ref 3.5–5.2)
PROT SERPL-MCNC: 7.1 G/DL (ref 6–8.5)
RBC # BLD AUTO: 4.31 10*6/MM3 (ref 3.77–5.28)
SARS-COV-2 N GENE NPH QL NAA+PROBE: NOT DETECTED
SODIUM SERPL-SCNC: 132 MMOL/L (ref 136–145)
WBC # BLD AUTO: 17.79 10*3/MM3 (ref 3.4–10.8)
WHOLE BLOOD HOLD SPECIMEN: NORMAL
WHOLE BLOOD HOLD SPECIMEN: NORMAL

## 2020-07-08 PROCEDURE — G0378 HOSPITAL OBSERVATION PER HR: HCPCS

## 2020-07-08 PROCEDURE — 44970 LAPAROSCOPY APPENDECTOMY: CPT | Performed by: SURGERY

## 2020-07-08 PROCEDURE — 36415 COLL VENOUS BLD VENIPUNCTURE: CPT

## 2020-07-08 PROCEDURE — 25010000002 HYDROMORPHONE PER 4 MG: Performed by: SURGERY

## 2020-07-08 PROCEDURE — 25010000002 ONDANSETRON PER 1 MG: Performed by: NURSE ANESTHETIST, CERTIFIED REGISTERED

## 2020-07-08 PROCEDURE — 25010000002 FENTANYL CITRATE (PF) 100 MCG/2ML SOLUTION: Performed by: NURSE ANESTHETIST, CERTIFIED REGISTERED

## 2020-07-08 PROCEDURE — 25010000002 PIPERACILLIN SOD-TAZOBACTAM PER 1 G: Performed by: EMERGENCY MEDICINE

## 2020-07-08 PROCEDURE — 88304 TISSUE EXAM BY PATHOLOGIST: CPT | Performed by: SURGERY

## 2020-07-08 PROCEDURE — 25010000002 ONDANSETRON PER 1 MG: Performed by: EMERGENCY MEDICINE

## 2020-07-08 PROCEDURE — 99284 EMERGENCY DEPT VISIT MOD MDM: CPT

## 2020-07-08 PROCEDURE — 25010000002 NEOSTIGMINE PER 0.5 MG: Performed by: ANESTHESIOLOGY

## 2020-07-08 PROCEDURE — 0DTJ4ZZ RESECTION OF APPENDIX, PERCUTANEOUS ENDOSCOPIC APPROACH: ICD-10-PCS | Performed by: SURGERY

## 2020-07-08 PROCEDURE — 25010000002 HYDROMORPHONE PER 4 MG: Performed by: EMERGENCY MEDICINE

## 2020-07-08 PROCEDURE — 25010000002 PIPERACILLIN SOD-TAZOBACTAM PER 1 G: Performed by: SURGERY

## 2020-07-08 PROCEDURE — 85025 COMPLETE CBC W/AUTO DIFF WBC: CPT | Performed by: EMERGENCY MEDICINE

## 2020-07-08 PROCEDURE — 83690 ASSAY OF LIPASE: CPT | Performed by: EMERGENCY MEDICINE

## 2020-07-08 PROCEDURE — 74177 CT ABD & PELVIS W/CONTRAST: CPT

## 2020-07-08 PROCEDURE — 25010000002 IOPAMIDOL 61 % SOLUTION: Performed by: EMERGENCY MEDICINE

## 2020-07-08 PROCEDURE — 63710000001 INSULIN REGULAR HUMAN PER 5 UNITS: Performed by: SURGERY

## 2020-07-08 PROCEDURE — 25010000002 PROPOFOL 10 MG/ML EMULSION: Performed by: NURSE ANESTHETIST, CERTIFIED REGISTERED

## 2020-07-08 PROCEDURE — 80053 COMPREHEN METABOLIC PANEL: CPT | Performed by: EMERGENCY MEDICINE

## 2020-07-08 PROCEDURE — 82962 GLUCOSE BLOOD TEST: CPT

## 2020-07-08 PROCEDURE — 99220 PR INITIAL OBSERVATION CARE/DAY 70 MINUTES: CPT | Performed by: SURGERY

## 2020-07-08 PROCEDURE — 87635 SARS-COV-2 COVID-19 AMP PRB: CPT | Performed by: EMERGENCY MEDICINE

## 2020-07-08 DEVICE — ENDOSCOPIC LINEAR CUTTER RELOADS GRAY 2.0 MM
Type: IMPLANTABLE DEVICE | Status: FUNCTIONAL
Brand: ECHELON; ENDOPATH

## 2020-07-08 DEVICE — THE ECHELON, ECHELON ENDOPATH™ AND ECHELON FLEX™ FAMILIES OF ENDOSCOPIC LINEAR CUTTERS AND RELOADS ARE STERILE, SINGLE PATIENT USE INSTRUMENTS THAT SIMULTANEOUSLY CUT AND STAPLE TISSUE. THERE ARE SIX STAGGERED ROWS OF STAPLES, THREE ON EITHER SIDE OF THE CUT LINE. THE 45 MM INSTRUMENTS HAVE A STAPLE LINE THATIS APPROXIMATELY 45 MM LONG AND A CUT LINE THAT IS APPROXIMATELY 42 MM LONG. THE SHAFT CAN ROTATE FREELY IN BOTH DIRECTIONS AND AN ARTICULATION MECHANISM ON ARTICULATING INSTRUMENTS ENABLES BENDING THE DISTAL PORTIONOF THE SHAFT TO FACILITATE LATERAL ACCESS OF THE OPERATIVE SITE.THE INSTRUMENTS ARE SHIPPED WITHOUT A RELOAD AND MUST BE LOADED PRIOR TO USE. A STAPLE RETAINING CAP ON THE RELOAD PROTECTS THE STAPLE LEG POINTS DURING SHIPPING AND TRANSPORTATION. THE INSTRUMENTS’ LOCK-OUT FEATURE IS DESIGNED TO PREVENT A USED RELOAD FROM BEING REFIRED.
Type: IMPLANTABLE DEVICE | Status: FUNCTIONAL
Brand: ECHELON ENDOPATH

## 2020-07-08 DEVICE — CLIP LIGAT VASC HORIZON TI MD/LG GRN 6CT: Type: IMPLANTABLE DEVICE | Status: FUNCTIONAL

## 2020-07-08 DEVICE — SEAL HEMO SURG ARISTA/AH ABS/PWDR 3GM: Type: IMPLANTABLE DEVICE | Status: FUNCTIONAL

## 2020-07-08 RX ORDER — PROMETHAZINE HYDROCHLORIDE 12.5 MG/1
12.5 TABLET ORAL EVERY 6 HOURS PRN
Status: DISCONTINUED | OUTPATIENT
Start: 2020-07-08 | End: 2020-07-15 | Stop reason: HOSPADM

## 2020-07-08 RX ORDER — ISOSORBIDE MONONITRATE 30 MG/1
30 TABLET, EXTENDED RELEASE ORAL NIGHTLY
Status: DISCONTINUED | OUTPATIENT
Start: 2020-07-08 | End: 2020-07-15 | Stop reason: HOSPADM

## 2020-07-08 RX ORDER — NALOXONE HCL 0.4 MG/ML
0.4 VIAL (ML) INJECTION
Status: DISCONTINUED | OUTPATIENT
Start: 2020-07-08 | End: 2020-07-15 | Stop reason: HOSPADM

## 2020-07-08 RX ORDER — PROMETHAZINE HYDROCHLORIDE 12.5 MG/1
12.5 SUPPOSITORY RECTAL EVERY 6 HOURS PRN
Status: DISCONTINUED | OUTPATIENT
Start: 2020-07-08 | End: 2020-07-15 | Stop reason: HOSPADM

## 2020-07-08 RX ORDER — EPHEDRINE SULFATE 50 MG/ML
5 INJECTION, SOLUTION INTRAVENOUS ONCE AS NEEDED
Status: DISCONTINUED | OUTPATIENT
Start: 2020-07-08 | End: 2020-07-08 | Stop reason: HOSPADM

## 2020-07-08 RX ORDER — SODIUM CHLORIDE, SODIUM LACTATE, POTASSIUM CHLORIDE, CALCIUM CHLORIDE 600; 310; 30; 20 MG/100ML; MG/100ML; MG/100ML; MG/100ML
9 INJECTION, SOLUTION INTRAVENOUS CONTINUOUS
Status: DISCONTINUED | OUTPATIENT
Start: 2020-07-08 | End: 2020-07-08

## 2020-07-08 RX ORDER — GLYCOPYRROLATE 0.2 MG/ML
INJECTION INTRAMUSCULAR; INTRAVENOUS AS NEEDED
Status: DISCONTINUED | OUTPATIENT
Start: 2020-07-08 | End: 2020-07-08 | Stop reason: SURG

## 2020-07-08 RX ORDER — SODIUM CHLORIDE 0.9 % (FLUSH) 0.9 %
10 SYRINGE (ML) INJECTION AS NEEDED
Status: DISCONTINUED | OUTPATIENT
Start: 2020-07-08 | End: 2020-07-08

## 2020-07-08 RX ORDER — DULOXETIN HYDROCHLORIDE 30 MG/1
30 CAPSULE, DELAYED RELEASE ORAL NIGHTLY
Status: DISCONTINUED | OUTPATIENT
Start: 2020-07-08 | End: 2020-07-15 | Stop reason: HOSPADM

## 2020-07-08 RX ORDER — DEXTROSE MONOHYDRATE 25 G/50ML
25 INJECTION, SOLUTION INTRAVENOUS
Status: DISCONTINUED | OUTPATIENT
Start: 2020-07-08 | End: 2020-07-15 | Stop reason: HOSPADM

## 2020-07-08 RX ORDER — SODIUM CHLORIDE 9 MG/ML
75 INJECTION, SOLUTION INTRAVENOUS CONTINUOUS
Status: DISCONTINUED | OUTPATIENT
Start: 2020-07-08 | End: 2020-07-10

## 2020-07-08 RX ORDER — SODIUM CHLORIDE 0.9 % (FLUSH) 0.9 %
3 SYRINGE (ML) INJECTION EVERY 12 HOURS SCHEDULED
Status: DISCONTINUED | OUTPATIENT
Start: 2020-07-08 | End: 2020-07-08 | Stop reason: HOSPADM

## 2020-07-08 RX ORDER — FENTANYL CITRATE 50 UG/ML
50 INJECTION, SOLUTION INTRAMUSCULAR; INTRAVENOUS
Status: DISCONTINUED | OUTPATIENT
Start: 2020-07-08 | End: 2020-07-08 | Stop reason: HOSPADM

## 2020-07-08 RX ORDER — HYDROMORPHONE HYDROCHLORIDE 1 MG/ML
0.5 INJECTION, SOLUTION INTRAMUSCULAR; INTRAVENOUS; SUBCUTANEOUS ONCE
Status: COMPLETED | OUTPATIENT
Start: 2020-07-08 | End: 2020-07-08

## 2020-07-08 RX ORDER — ACETAMINOPHEN 325 MG/1
650 TABLET ORAL ONCE AS NEEDED
Status: DISCONTINUED | OUTPATIENT
Start: 2020-07-08 | End: 2020-07-08 | Stop reason: HOSPADM

## 2020-07-08 RX ORDER — PROMETHAZINE HYDROCHLORIDE 25 MG/ML
12.5 INJECTION, SOLUTION INTRAMUSCULAR; INTRAVENOUS EVERY 6 HOURS PRN
Status: DISCONTINUED | OUTPATIENT
Start: 2020-07-08 | End: 2020-07-15 | Stop reason: HOSPADM

## 2020-07-08 RX ORDER — FAMOTIDINE 10 MG/ML
20 INJECTION, SOLUTION INTRAVENOUS ONCE
Status: DISCONTINUED | OUTPATIENT
Start: 2020-07-08 | End: 2020-07-08 | Stop reason: HOSPADM

## 2020-07-08 RX ORDER — LIDOCAINE HYDROCHLORIDE 20 MG/ML
INJECTION, SOLUTION INFILTRATION; PERINEURAL AS NEEDED
Status: DISCONTINUED | OUTPATIENT
Start: 2020-07-08 | End: 2020-07-08 | Stop reason: SURG

## 2020-07-08 RX ORDER — MIDAZOLAM HYDROCHLORIDE 1 MG/ML
1 INJECTION INTRAMUSCULAR; INTRAVENOUS
Status: DISCONTINUED | OUTPATIENT
Start: 2020-07-08 | End: 2020-07-08 | Stop reason: HOSPADM

## 2020-07-08 RX ORDER — DIPHENHYDRAMINE HCL 25 MG
25 CAPSULE ORAL
Status: DISCONTINUED | OUTPATIENT
Start: 2020-07-08 | End: 2020-07-08 | Stop reason: HOSPADM

## 2020-07-08 RX ORDER — SODIUM CHLORIDE 0.9 % (FLUSH) 0.9 %
10 SYRINGE (ML) INJECTION AS NEEDED
Status: DISCONTINUED | OUTPATIENT
Start: 2020-07-08 | End: 2020-07-15 | Stop reason: HOSPADM

## 2020-07-08 RX ORDER — FAMOTIDINE 10 MG/ML
20 INJECTION, SOLUTION INTRAVENOUS ONCE
Status: COMPLETED | OUTPATIENT
Start: 2020-07-08 | End: 2020-07-08

## 2020-07-08 RX ORDER — NICOTINE POLACRILEX 4 MG
15 LOZENGE BUCCAL
Status: DISCONTINUED | OUTPATIENT
Start: 2020-07-08 | End: 2020-07-15 | Stop reason: HOSPADM

## 2020-07-08 RX ORDER — ROCURONIUM BROMIDE 10 MG/ML
INJECTION, SOLUTION INTRAVENOUS AS NEEDED
Status: DISCONTINUED | OUTPATIENT
Start: 2020-07-08 | End: 2020-07-08 | Stop reason: SURG

## 2020-07-08 RX ORDER — SODIUM CHLORIDE 0.9 % (FLUSH) 0.9 %
3-10 SYRINGE (ML) INJECTION AS NEEDED
Status: DISCONTINUED | OUTPATIENT
Start: 2020-07-08 | End: 2020-07-08 | Stop reason: HOSPADM

## 2020-07-08 RX ORDER — MAGNESIUM HYDROXIDE 1200 MG/15ML
LIQUID ORAL AS NEEDED
Status: DISCONTINUED | OUTPATIENT
Start: 2020-07-08 | End: 2020-07-08 | Stop reason: HOSPADM

## 2020-07-08 RX ORDER — CHLORTHALIDONE 25 MG/1
25 TABLET ORAL DAILY
Status: DISCONTINUED | OUTPATIENT
Start: 2020-07-08 | End: 2020-07-15 | Stop reason: HOSPADM

## 2020-07-08 RX ORDER — BUPIVACAINE HYDROCHLORIDE AND EPINEPHRINE 5; 5 MG/ML; UG/ML
INJECTION, SOLUTION PERINEURAL AS NEEDED
Status: DISCONTINUED | OUTPATIENT
Start: 2020-07-08 | End: 2020-07-08 | Stop reason: HOSPADM

## 2020-07-08 RX ORDER — HYDROMORPHONE HYDROCHLORIDE 1 MG/ML
0.5 INJECTION, SOLUTION INTRAMUSCULAR; INTRAVENOUS; SUBCUTANEOUS
Status: DISCONTINUED | OUTPATIENT
Start: 2020-07-08 | End: 2020-07-08 | Stop reason: HOSPADM

## 2020-07-08 RX ORDER — ONDANSETRON 2 MG/ML
4 INJECTION INTRAMUSCULAR; INTRAVENOUS ONCE
Status: COMPLETED | OUTPATIENT
Start: 2020-07-08 | End: 2020-07-08

## 2020-07-08 RX ORDER — PROPOFOL 10 MG/ML
VIAL (ML) INTRAVENOUS AS NEEDED
Status: DISCONTINUED | OUTPATIENT
Start: 2020-07-08 | End: 2020-07-08 | Stop reason: SURG

## 2020-07-08 RX ORDER — NITROGLYCERIN 0.4 MG/1
0.4 TABLET SUBLINGUAL
Status: DISCONTINUED | OUTPATIENT
Start: 2020-07-08 | End: 2020-07-15 | Stop reason: HOSPADM

## 2020-07-08 RX ORDER — PROPOFOL 10 MG/ML
VIAL (ML) INTRAVENOUS AS NEEDED
Status: DISCONTINUED | OUTPATIENT
Start: 2020-07-08 | End: 2020-07-08

## 2020-07-08 RX ORDER — HYDROCODONE BITARTRATE AND ACETAMINOPHEN 7.5; 325 MG/1; MG/1
1 TABLET ORAL ONCE AS NEEDED
Status: DISCONTINUED | OUTPATIENT
Start: 2020-07-08 | End: 2020-07-08 | Stop reason: HOSPADM

## 2020-07-08 RX ORDER — FLUMAZENIL 0.1 MG/ML
0.2 INJECTION INTRAVENOUS AS NEEDED
Status: DISCONTINUED | OUTPATIENT
Start: 2020-07-08 | End: 2020-07-08 | Stop reason: HOSPADM

## 2020-07-08 RX ORDER — METOPROLOL SUCCINATE 50 MG/1
50 TABLET, EXTENDED RELEASE ORAL
Status: DISCONTINUED | OUTPATIENT
Start: 2020-07-08 | End: 2020-07-15 | Stop reason: HOSPADM

## 2020-07-08 RX ORDER — FENTANYL CITRATE 50 UG/ML
INJECTION, SOLUTION INTRAMUSCULAR; INTRAVENOUS AS NEEDED
Status: DISCONTINUED | OUTPATIENT
Start: 2020-07-08 | End: 2020-07-08 | Stop reason: SURG

## 2020-07-08 RX ORDER — ONDANSETRON 2 MG/ML
4 INJECTION INTRAMUSCULAR; INTRAVENOUS ONCE AS NEEDED
Status: DISCONTINUED | OUTPATIENT
Start: 2020-07-08 | End: 2020-07-08 | Stop reason: HOSPADM

## 2020-07-08 RX ORDER — ONDANSETRON 2 MG/ML
INJECTION INTRAMUSCULAR; INTRAVENOUS AS NEEDED
Status: DISCONTINUED | OUTPATIENT
Start: 2020-07-08 | End: 2020-07-08 | Stop reason: SURG

## 2020-07-08 RX ORDER — HYDRALAZINE HYDROCHLORIDE 20 MG/ML
5 INJECTION INTRAMUSCULAR; INTRAVENOUS
Status: DISCONTINUED | OUTPATIENT
Start: 2020-07-08 | End: 2020-07-08 | Stop reason: HOSPADM

## 2020-07-08 RX ORDER — NALOXONE HCL 0.4 MG/ML
0.2 VIAL (ML) INJECTION AS NEEDED
Status: DISCONTINUED | OUTPATIENT
Start: 2020-07-08 | End: 2020-07-08 | Stop reason: HOSPADM

## 2020-07-08 RX ORDER — HYDROMORPHONE HYDROCHLORIDE 1 MG/ML
0.5 INJECTION, SOLUTION INTRAMUSCULAR; INTRAVENOUS; SUBCUTANEOUS
Status: DISCONTINUED | OUTPATIENT
Start: 2020-07-08 | End: 2020-07-15 | Stop reason: HOSPADM

## 2020-07-08 RX ORDER — LIDOCAINE HYDROCHLORIDE 10 MG/ML
0.5 INJECTION, SOLUTION EPIDURAL; INFILTRATION; INTRACAUDAL; PERINEURAL ONCE AS NEEDED
Status: DISCONTINUED | OUTPATIENT
Start: 2020-07-08 | End: 2020-07-08 | Stop reason: HOSPADM

## 2020-07-08 RX ORDER — OXYCODONE AND ACETAMINOPHEN 7.5; 325 MG/1; MG/1
1 TABLET ORAL ONCE AS NEEDED
Status: DISCONTINUED | OUTPATIENT
Start: 2020-07-08 | End: 2020-07-08 | Stop reason: HOSPADM

## 2020-07-08 RX ORDER — SODIUM CHLORIDE 0.9 % (FLUSH) 0.9 %
10 SYRINGE (ML) INJECTION EVERY 12 HOURS SCHEDULED
Status: DISCONTINUED | OUTPATIENT
Start: 2020-07-08 | End: 2020-07-15 | Stop reason: HOSPADM

## 2020-07-08 RX ORDER — DIPHENHYDRAMINE HYDROCHLORIDE 50 MG/ML
12.5 INJECTION INTRAMUSCULAR; INTRAVENOUS
Status: DISCONTINUED | OUTPATIENT
Start: 2020-07-08 | End: 2020-07-08 | Stop reason: HOSPADM

## 2020-07-08 RX ADMIN — IOPAMIDOL 85 ML: 612 INJECTION, SOLUTION INTRAVENOUS at 05:03

## 2020-07-08 RX ADMIN — ROCURONIUM BROMIDE 40 MG: 10 INJECTION INTRAVENOUS at 14:43

## 2020-07-08 RX ADMIN — SODIUM CHLORIDE, PRESERVATIVE FREE 10 ML: 5 INJECTION INTRAVENOUS at 20:56

## 2020-07-08 RX ADMIN — HYDROMORPHONE HYDROCHLORIDE 0.5 MG: 1 INJECTION, SOLUTION INTRAMUSCULAR; INTRAVENOUS; SUBCUTANEOUS at 04:40

## 2020-07-08 RX ADMIN — FAMOTIDINE 20 MG: 10 INJECTION INTRAVENOUS at 12:59

## 2020-07-08 RX ADMIN — DULOXETINE HYDROCHLORIDE 30 MG: 30 CAPSULE, DELAYED RELEASE ORAL at 20:54

## 2020-07-08 RX ADMIN — INSULIN HUMAN 3 UNITS: 100 INJECTION, SOLUTION PARENTERAL at 23:27

## 2020-07-08 RX ADMIN — SODIUM CHLORIDE, POTASSIUM CHLORIDE, SODIUM LACTATE AND CALCIUM CHLORIDE 9 ML/HR: 600; 310; 30; 20 INJECTION, SOLUTION INTRAVENOUS at 12:59

## 2020-07-08 RX ADMIN — TAZOBACTAM SODIUM AND PIPERACILLIN SODIUM 3.38 G: 375; 3 INJECTION, SOLUTION INTRAVENOUS at 18:22

## 2020-07-08 RX ADMIN — ONDANSETRON HYDROCHLORIDE 4 MG: 2 SOLUTION INTRAMUSCULAR; INTRAVENOUS at 15:19

## 2020-07-08 RX ADMIN — AMLODIPINE BESYLATE: 2.5 TABLET ORAL at 20:54

## 2020-07-08 RX ADMIN — GLYCOPYRROLATE 0.4 MG: 0.2 INJECTION INTRAMUSCULAR; INTRAVENOUS at 15:35

## 2020-07-08 RX ADMIN — ONDANSETRON 4 MG: 2 INJECTION INTRAMUSCULAR; INTRAVENOUS at 04:40

## 2020-07-08 RX ADMIN — FENTANYL CITRATE 100 MCG: 50 INJECTION INTRAMUSCULAR; INTRAVENOUS at 14:40

## 2020-07-08 RX ADMIN — PROPOFOL 150 MG: 10 INJECTION, EMULSION INTRAVENOUS at 14:43

## 2020-07-08 RX ADMIN — METOPROLOL SUCCINATE 50 MG: 50 TABLET, EXTENDED RELEASE ORAL at 20:52

## 2020-07-08 RX ADMIN — CHLORTHALIDONE 25 MG: 25 TABLET ORAL at 20:54

## 2020-07-08 RX ADMIN — LIDOCAINE HYDROCHLORIDE 100 MG: 20 INJECTION, SOLUTION INFILTRATION; PERINEURAL at 14:40

## 2020-07-08 RX ADMIN — TAZOBACTAM SODIUM AND PIPERACILLIN SODIUM 3.38 G: 375; 3 INJECTION, SOLUTION INTRAVENOUS at 08:10

## 2020-07-08 RX ADMIN — NEOSTIGMINE METHYLSULFATE 2.5 MG: 1 INJECTION INTRAMUSCULAR; INTRAVENOUS; SUBCUTANEOUS at 15:35

## 2020-07-08 RX ADMIN — SUGAMMADEX 200 MG: 100 INJECTION, SOLUTION INTRAVENOUS at 16:02

## 2020-07-08 RX ADMIN — SODIUM CHLORIDE 100 ML/HR: 9 INJECTION, SOLUTION INTRAVENOUS at 18:23

## 2020-07-08 RX ADMIN — SODIUM CHLORIDE 1000 ML: 9 INJECTION, SOLUTION INTRAVENOUS at 04:37

## 2020-07-08 RX ADMIN — HYDROMORPHONE HYDROCHLORIDE 0.5 MG: 1 INJECTION, SOLUTION INTRAMUSCULAR; INTRAVENOUS; SUBCUTANEOUS at 10:59

## 2020-07-08 RX ADMIN — ISOSORBIDE MONONITRATE 30 MG: 30 TABLET ORAL at 20:53

## 2020-07-08 NOTE — SIGNIFICANT NOTE
Spoke with son David Ackerman on phone and informed him pt was doing well in recovery and is now ready to go to her room Y575

## 2020-07-08 NOTE — ANESTHESIA POSTPROCEDURE EVALUATION
"Patient: Navi Kinney    Procedure Summary     Date:  07/08/20 Room / Location:  [facility] OR  / [facility] MAIN OR    Anesthesia Start:  1432 Anesthesia Stop:  1615    Procedure:  APPENDECTOMY LAPAROSCOPIC (N/A Abdomen) Diagnosis:      Surgeon:  Guzman Beal MD Provider:  German Barrientos MD    Anesthesia Type:  general ASA Status:  3          Anesthesia Type: general    Vitals  Vitals Value Taken Time   /75 7/8/2020  5:15 PM   Temp 36.7 °C (98.1 °F) 7/8/2020  4:12 PM   Pulse 88 7/8/2020  5:18 PM   Resp 16 7/8/2020  5:15 PM   SpO2 96 % 7/8/2020  5:18 PM   Vitals shown include unvalidated device data.        Post Anesthesia Care and Evaluation    Patient location during evaluation: bedside  Pain management: adequate  Airway patency: patent  Anesthetic complications: No anesthetic complications    Cardiovascular status: acceptable  Respiratory status: acceptable  Hydration status: acceptable    Comments: /75   Pulse 88   Temp 36.7 °C (98.1 °F) (Oral)   Resp 16   Ht 165.1 cm (65\")   Wt 79.4 kg (175 lb)   LMP  (LMP Unknown)   SpO2 95%   BMI 29.12 kg/m²         "

## 2020-07-08 NOTE — PLAN OF CARE
Problem: Infection, Risk/Actual (Adult)  Goal: Identify Related Risk Factors and Signs and Symptoms  Outcome: Ongoing (interventions implemented as appropriate)  Goal: Infection Prevention/Resolution  Outcome: Ongoing (interventions implemented as appropriate)     Problem: Patient Care Overview  Goal: Plan of Care Review  Outcome: Ongoing (interventions implemented as appropriate)  Flowsheets  Taken 7/8/2020 1842  Progress: no change  Outcome Summary: Patient arouses to voice. Patient resting with eyes closed. Son at bedside with patient. Surgical sites clean, dry, and intact. Vital signs are stable. On 1 liter oxygen via nc. No s/s of acute distress. Will continue to monitor.  Taken 7/8/2020 1807  Plan of Care Reviewed With: patient  Goal: Individualization and Mutuality  Outcome: Ongoing (interventions implemented as appropriate)  Goal: Discharge Needs Assessment  Outcome: Ongoing (interventions implemented as appropriate)  Goal: Interprofessional Rounds/Family Conf  Outcome: Ongoing (interventions implemented as appropriate)     Problem: Fall Risk (Adult)  Goal: Identify Related Risk Factors and Signs and Symptoms  Outcome: Ongoing (interventions implemented as appropriate)  Goal: Absence of Fall  Outcome: Ongoing (interventions implemented as appropriate)

## 2020-07-08 NOTE — BRIEF OP NOTE
APPENDECTOMY LAPAROSCOPIC  Progress Note    Navi Kinney  7/8/2020    Pre-op Diagnosis:   Acute appendicitis       Post-Op Diagnosis Codes:  Same    Procedure/CPT® Codes:      Procedure(s):  APPENDECTOMY LAPAROSCOPIC    Surgeon(s):  Guzman Beal MD    Anesthesia: General    Staff:   Circulator: Leonor Bush RN  Scrub Person: Cassidy Hoover  Assistant: Lissett Nettles CSA    Estimated Blood Loss: minimal    Urine Voided: * No values recorded between 7/8/2020  2:31 PM and 7/8/2020  3:45 PM *    Specimens:                Specimens     ID Source Type Tests Collected By Collected At Frozen?      A Large Intestine, Appendix Tissue · TISSUE PATHOLOGY EXAM   Guzman Beal MD 7/8/20 1525      Description: Appendix                Drains:   Urethral Catheter (Active)   Site Assessment Clean;Skin intact 7/8/2020  1:58 PM   Collection Container Standard drainage bag 7/8/2020  1:58 PM   Securement Method Leg strap 7/8/2020  1:58 PM   Catheter care complete Yes 7/8/2020  1:58 PM   Output (mL) 900 mL 7/8/2020  2:00 PM       Findings: Acute appendicitis    Complications: None      Guzman Beal MD     Date: 7/8/2020  Time: 15:59

## 2020-07-08 NOTE — H&P
Admission H&P    Admiting Physician: Guzman Beal MD    Reason for admission: Acute appendicitis.    Chief Complaint   Patient presents with   • Abdominal Pain       HPI:   The patient is a very pleasant 79 y.o. years old female that presented to the hospital with abdominal pain.  The pain is described as dull, and is 6/10 in intensity. Pain is located in the RLQ without radiation. Onset was 10 hours ago. Symptoms have been unchanged since. Aggravating factors: activity.  Alleviating factors: recumbency. Associated symptoms: anorexia and nausea. The patient denies chills, constipation, diarrhea, fever, frequency, hematuria, melena, myalgias and vomiting.   CT scan of the abdomen was performed that showed evidence of Acute uncomplicated appendicitis    Past Medical History:   Diagnosis Date   • Anxiety    • Arthritis    • Carotid artery stenosis     mild stenosis bilaterally per US   • CKD (chronic kidney disease)     Stage III   • Coronary artery disease    • Depression    • Hyperlipidemia    • Hypertension    • Neuromuscular disorder (CMS/HCC)    • Sinus bradycardia    • Sleep apnea     does not uses CPAP or BiPAP   • Slow to wake up after anesthesia    • Ventral hernia        Past Surgical History:   Procedure Laterality Date   • CARDIAC CATHETERIZATION N/A 2010    Dr. Araiza; total RCA, occ SVG to RCA, subtotal small mild Cx, patent LIMA to LAD, MYA to OM; EF normal   • CARDIAC SURGERY     • CATARACT EXTRACTION W/ INTRAOCULAR LENS IMPLANT Bilateral 2012    Dr. Carlos   • CHOLECYSTECTOMY N/A 08/22/2008    Dr. Karlie Rabago   • COLONOSCOPY N/A 12/01/2013   • CORONARY ARTERY BYPASS GRAFT N/A 11/11/1997    Quadruple CABG-Dr. Henson   • TONSILLECTOMY Bilateral    • TOTAL HIP ARTHROPLASTY Right 04/29/2015    Right anterior approach total hip arthroplasty-Dr. Albert Espino   • TOTAL HIP ARTHROPLASTY Left 12/22/2014    Left anterior approach total hip arthroplasty-Dr. Albert Espino   • VENTRAL/INCISIONAL HERNIA REPAIR N/A  1/26/2018    Procedure: open recurrent INCISIONAL HERNIA REPAIR with mesh;  Surgeon: Nereida Almodovar MD;  Location: [facility] OR;  Service:          Current Facility-Administered Medications:   •  sodium chloride 0.9 % flush 10 mL, 10 mL, Intravenous, PRN, Daniel Raymond MD    Current Outpatient Medications:   •  amLODIPine-benazepril (LOTREL 2.5-10) 2.5-10 MG per capsule, Take 1 capsule by mouth Every Night., Disp: 90 capsule, Rfl: 1  •  aspirin 81 MG tablet, Take 81 mg by mouth Daily., Disp: , Rfl:   •  atorvastatin (LIPITOR) 10 MG tablet, Take 1 tablet by mouth Daily., Disp: 90 tablet, Rfl: 1  •  chlorthalidone (HYGROTON) 25 MG tablet, TAKE ONE TABLET BY MOUTH DAILY, Disp: 90 tablet, Rfl: 2  •  colestipol (COLESTID) 1 g tablet, Take 2 g by mouth., Disp: , Rfl:   •  Cyanocobalamin (VITAMIN B-12 PO), Take 500 mcg by mouth Every Night., Disp: , Rfl:   •  DULoxetine (CYMBALTA) 30 MG capsule, Take 1 capsule by mouth Every Night., Disp: 90 capsule, Rfl: 2  •  isosorbide mononitrate (IMDUR) 30 MG 24 hr tablet, Take 1 tablet by mouth Every Night., Disp: 90 tablet, Rfl: 2  •  loperamide (IMODIUM A-D) 2 MG tablet, Take 2 mg by mouth., Disp: , Rfl:   •  metFORMIN ER (GLUCOPHAGE-XR) 500 MG 24 hr tablet, TAKE ONE TABLET BY MOUTH DAILY WITH BREAKFAST, Disp: 30 tablet, Rfl: 2  •  metoprolol succinate XL (TOPROL-XL) 50 MG 24 hr tablet, TAKE ONE TABLET BY MOUTH NIGHTLY, Disp: 90 tablet, Rfl: 0  •  nitroglycerin (NITROSTAT) 0.4 MG SL tablet, Place 1 tablet under the tongue Every 5 (Five) Minutes As Needed for Chest Pain., Disp: 90 tablet, Rfl: 3    No Known Allergies    Social History     Socioeconomic History   • Marital status:      Spouse name: Not on file   • Number of children: 3   • Years of education: Not on file   • Highest education level: Not on file   Occupational History     Employer: RETIRED   Tobacco Use   • Smoking status: Former Smoker     Packs/day: 1.00     Years: 25.00     Pack years: 25.00      Types: Cigarettes     Last attempt to quit:      Years since quittin.5   • Smokeless tobacco: Never Used   Substance and Sexual Activity   • Alcohol use: No   • Drug use: No   • Sexual activity: Defer       Family History   Problem Relation Age of Onset   • Heart disease Mother    • Hypertension Mother    • Malig Hyperthermia Neg Hx        ROS:   Constitutional: denies any weight changes, fatigue or weakness.  Eyes: : denies blurred or double vision  Cardiovascular: denies chest pain, palpitations, edemas.  Respiratory: denies cough, sputum, SOB.  Gastrointestinal: denies diarrhea, constipation.  Genitourinary: denies dysuria, frequency.  Endocrine: denies cold intolerance, lethargy and flushing.  Hem: denies excessive bruising and postop bleeding.  Musculoskeletal: denies weakness, joint swelling, pain or stiffness.  Neuro: denies seizures, CVA, paresthesia, or peripheral neuropathy.   Skin: denies change in nevi, rashes, masses.    Physical Exam:   Vitals:    20 0800   BP:    Pulse:    Resp:    Temp:    SpO2: 96%     GENERAL:alert, well appearing, and in no distress and oriented to person, place, and time  HEENT: normochephalic, atraumatic, no scleral icterus moist mucous membranes.  NECK: Supple there is no thyromegaly or lymphadenopathy  CHEST: clear to auscultation, no wheezes, rales or rhonchi, symmetric air entry  CARDIAC: regular rate and rhythm    ABDOMEN: soft, obese, nondistended, no masses or organomegaly  tenderness noted over the right lower quadrant and left lower quadrant, positive localized peritoneal irritation in the right lower quadrant, epigastric scar from prior hernia repair  EXTREMITIES: no cyanosis, clubbing or edema    NEURO: alert and oriented, normal speech, cranial nerves 2-12 grossly intact, no focal deficits   SKIN: Moist, warm, no rashes.    Diagnostic workup:   CT abdomen and pelvis 2020: There is evidence of acute appendicitis with several appendicoliths at the  lumen.  Hepatic steatosis and colonic diverticulosis    White blood cell count 17.7, hemoglobin 13.3  Sodium 132  Blood glucose 223  Chloride 95  Otherwise normal labs    Assessment and plan:   The patient is a very pleasant 79 y.o. years old female with clinical as well as imaging findings of acute appendicitis.  I discussed with the patient about further step and option for treatment that would include conservative management with antibiotic treatment versus laparoscopic appendectomy.  Risk and benefits of both approaches including approximately 50% failure with antibiotic therapy alone and the risk of bleeding, infections, intra-abdominal injuries, intra-abdominal abscess and stump leaks for laparoscopic appendectomy.  Patient has elected to undergo laparoscopic appendectomy.    Plan:    - Laparoscopic appendectomy, possible open.   - NPO  - IVF  - IV ATx  - Consent for laparoscopic appendectomy possible open    Case was discussed with patient.    Risk and benefits of the current recommended treatment were explained to the patient that had time to ask questions that where answered, verbalized understanding and agreed with the plan.     Guzman Beal MD  General, Minimally Invasive and Endoscopic Surgery  [facility]    [address], [address]  [place], [place], [address]  P: [phone]  F: [phone]

## 2020-07-08 NOTE — ANESTHESIA POSTPROCEDURE EVALUATION
"Patient: Navi Kinney    Procedure Summary     Date:  07/08/20 Room / Location:  [facility] OR  / [facility] MAIN OR    Anesthesia Start:  1432 Anesthesia Stop:  1615    Procedure:  APPENDECTOMY LAPAROSCOPIC (N/A Abdomen) Diagnosis:      Surgeon:  Guzman Beal MD Provider:  German Barrientos MD    Anesthesia Type:  general ASA Status:  3          Anesthesia Type: general    Vitals  Vitals Value Taken Time   /75 7/8/2020  5:15 PM   Temp 36.7 °C (98.1 °F) 7/8/2020  4:12 PM   Pulse 86 7/8/2020  5:19 PM   Resp 16 7/8/2020  5:15 PM   SpO2 96 % 7/8/2020  5:19 PM   Vitals shown include unvalidated device data.        Post Anesthesia Care and Evaluation    Patient location during evaluation: bedside  Patient participation: complete - patient participated  Level of consciousness: awake and alert  Pain score: 0  Pain management: adequate  Airway patency: patent  Anesthetic complications: No anesthetic complications    Cardiovascular status: acceptable  Respiratory status: acceptable  Hydration status: acceptable    Comments: /75   Pulse 88   Temp 36.7 °C (98.1 °F) (Oral)   Resp 16   Ht 165.1 cm (65\")   Wt 79.4 kg (175 lb)   LMP  (LMP Unknown)   SpO2 95%   BMI 29.12 kg/m²       "

## 2020-07-08 NOTE — ED PROVIDER NOTES
EMERGENCY DEPARTMENT ENCOUNTER    CHIEF COMPLAINT  Chief Complaint: Abdominal pain  History given by: Patient  History limited by: None  Room Number: 13/13  PMD: Love LuluCORNELL      HPI:  Pt is a 79 y.o. female who presents complaining of lower abdominal pain that began fairly acutely last night at approximately 9:00.  The patient states she was at rest when the symptoms started.  The patient states the symptoms were moderate when they began and they have progressively intensified since onset.  She describes the pain as a sharp sensation and it is nonradiating.  She states that it is associated with nausea but no vomiting.  She denies fevers and chills, chest pain, shortness of breath, headache, dizziness, blurry vision.  The patient states that the symptoms are worse with touch and worse with movement and nothing thus far has alleviated her symptoms.  She has not attempted any treatment prior to arrival.  The patient states that she has never had pain quite like this before.  She states that she has had multiple abdominal surgeries including a cholecystectomy and a hernia repair.  The patient states she does have her appendix still.      PAST MEDICAL HISTORY  Active Ambulatory Problems     Diagnosis Date Noted   • Acute upper respiratory infection 04/12/2016   • Ankle pain 04/12/2016   • Arthralgia of hip 04/12/2016   • Atherosclerosis of coronary artery 04/12/2016   • Chronic kidney disease, stage III (moderate) (CMS/[facility]) 04/12/2016   • Depression 04/12/2016   • Non-insulin dependent type 2 diabetes mellitus (CMS/[facility]) 04/12/2016   • Dyspnea on exertion 04/12/2016   • Foot pain 04/12/2016   • Hypertension 04/12/2016   • Hyperlipidemia 04/12/2016   • Degeneration of intervertebral disc of lumbar region 04/12/2016   • Lumbar radiculopathy 04/12/2016   • Spinal stenosis of lumbar region 04/12/2016   • Osteoarthritis of hip 04/12/2016   • Palpitations 04/12/2016   • Seborrheic keratosis 04/12/2016   • Visit for  screening mammogram 10/14/2016   • Torus palatinus 03/03/2017   • Medicare annual wellness visit, subsequent 05/04/2017   • Osteoarthritis of knee 05/04/2017   • Acute pain of both shoulders/ since 7/2/18 07/09/2018   • Right elbow pain 08/15/2018     Resolved Ambulatory Problems     Diagnosis Date Noted   • Incisional hernia, without obstruction or gangrene, recurrent 04/12/2016   • Cobalamin deficiency 04/12/2016   • Recurrent incisional hernia 12/13/2017   • Recurrent ventral incisional hernia 01/26/2018   • Ventral hernia without obstruction or gangrene, at mediastinal chest tube site 01/26/2018     Past Medical History:   Diagnosis Date   • Anxiety    • Arthritis    • Carotid artery stenosis    • CKD (chronic kidney disease)    • Coronary artery disease    • Neuromuscular disorder (CMS/HCC)    • Sinus bradycardia    • Sleep apnea    • Slow to wake up after anesthesia    • Ventral hernia        PAST SURGICAL HISTORY  Past Surgical History:   Procedure Laterality Date   • CARDIAC CATHETERIZATION N/A 2010    Dr. Araiza; total RCA, occ SVG to RCA, subtotal small mild Cx, patent LIMA to LAD, MYA to OM; EF normal   • CARDIAC SURGERY     • CATARACT EXTRACTION W/ INTRAOCULAR LENS IMPLANT Bilateral 2012    Dr. Carlos   • CHOLECYSTECTOMY N/A 08/22/2008    Dr. Karlie Rabago   • COLONOSCOPY N/A 12/01/2013   • CORONARY ARTERY BYPASS GRAFT N/A 11/11/1997    Quadruple CABG-Dr. Henson   • TONSILLECTOMY Bilateral    • TOTAL HIP ARTHROPLASTY Right 04/29/2015    Right anterior approach total hip arthroplasty-Dr. Albert Espino   • TOTAL HIP ARTHROPLASTY Left 12/22/2014    Left anterior approach total hip arthroplasty-Dr. Albert Espino   • VENTRAL/INCISIONAL HERNIA REPAIR N/A 1/26/2018    Procedure: open recurrent INCISIONAL HERNIA REPAIR with mesh;  Surgeon: Nereida Almodovar MD;  Location: [facility] OR;  Service:        FAMILY HISTORY  Family History   Problem Relation Age of Onset   • Heart disease Mother    • Hypertension Mother    •  Malig Hyperthermia Neg Hx        SOCIAL HISTORY  Social History     Socioeconomic History   • Marital status:      Spouse name: Not on file   • Number of children: 3   • Years of education: Not on file   • Highest education level: Not on file   Occupational History     Employer: RETIRED   Tobacco Use   • Smoking status: Former Smoker     Packs/day: 1.00     Years: 25.00     Pack years: 25.00     Types: Cigarettes     Last attempt to quit:      Years since quittin.5   • Smokeless tobacco: Never Used   Substance and Sexual Activity   • Alcohol use: No   • Drug use: No   • Sexual activity: Defer       ALLERGIES  Patient has no known allergies.    REVIEW OF SYSTEMS  Review of Systems   Constitutional: Negative for fever.   HENT: Negative for sore throat.    Eyes: Negative.    Respiratory: Negative for cough and shortness of breath.    Cardiovascular: Negative for chest pain.   Gastrointestinal: Positive for abdominal pain and nausea. Negative for diarrhea and vomiting.   Genitourinary: Negative for dysuria.   Musculoskeletal: Negative for neck pain.   Skin: Negative for rash.   Allergic/Immunologic: Negative.    Neurological: Negative for weakness, numbness and headaches.   Hematological: Negative.    Psychiatric/Behavioral: Negative.    All other systems reviewed and are negative.      PHYSICAL EXAM  ED Triage Vitals [20 0149]   Temp Heart Rate Resp BP SpO2   97.5 °F (36.4 °C) 78 20 -- 96 %      Temp src Heart Rate Source Patient Position BP Location FiO2 (%)   Tympanic -- -- -- --       Physical Exam   Constitutional: She is oriented to person, place, and time. No distress.   HENT:   Head: Normocephalic and atraumatic.   Eyes: Pupils are equal, round, and reactive to light. EOM are normal.   Neck: Normal range of motion. Neck supple.   Cardiovascular: Normal rate, regular rhythm and normal heart sounds.   Pulmonary/Chest: Effort normal and breath sounds normal. No respiratory distress.    Abdominal: Soft. There is tenderness in the right lower quadrant. There is no rebound and no guarding.   Musculoskeletal: Normal range of motion. She exhibits no edema.   Neurological: She is alert and oriented to person, place, and time. She has normal sensation and normal strength.   Skin: Skin is warm and dry. No rash noted.   Psychiatric: Mood and affect normal.   Nursing note and vitals reviewed.      LAB RESULTS  Lab Results (last 24 hours)     Procedure Component Value Units Date/Time    CBC & Differential [[id number]] Collected:  07/08/20 0405    Specimen:  Blood Updated:  07/08/20 0415    Narrative:       The following orders were created for panel order CBC & Differential.  Procedure                               Abnormality         Status                     ---------                               -----------         ------                     CBC Auto Differential[[id number]]        Abnormal            Final result                 Please view results for these tests on the individual orders.    Comprehensive Metabolic Panel [[id number]]  (Abnormal) Collected:  07/08/20 0405    Specimen:  Blood Updated:  07/08/20 0437     Glucose 223 mg/dL      BUN 20 mg/dL      Creatinine 0.83 mg/dL      Sodium 132 mmol/L      Potassium 3.9 mmol/L      Chloride 95 mmol/L      CO2 22.5 mmol/L      Calcium 9.9 mg/dL      Total Protein 7.1 g/dL      Albumin 4.20 g/dL      ALT (SGPT) 25 U/L      AST (SGOT) 16 U/L      Alkaline Phosphatase 88 U/L      Total Bilirubin 0.4 mg/dL      eGFR Non African Amer 66 mL/min/1.73      Globulin 2.9 gm/dL      A/G Ratio 1.4 g/dL      BUN/Creatinine Ratio 24.1     Anion Gap 14.5 mmol/L     Narrative:       GFR Normal >60  Chronic Kidney Disease <60  Kidney Failure <15      Lipase [[id number]]  (Normal) Collected:  07/08/20 0405    Specimen:  Blood Updated:  07/08/20 0437     Lipase 45 U/L     CBC Auto Differential [[id number]]  (Abnormal) Collected:  07/08/20 0405    Specimen:  Blood Updated:   07/08/20 0415     WBC 17.79 10*3/mm3      RBC 4.31 10*6/mm3      Hemoglobin 13.3 g/dL      Hematocrit 39.0 %      MCV 90.5 fL      MCH 30.9 pg      MCHC 34.1 g/dL      RDW 12.1 %      RDW-SD 40.2 fl      MPV 10.0 fL      Platelets 278 10*3/mm3      Neutrophil % 85.2 %      Lymphocyte % 8.5 %      Monocyte % 4.7 %      Eosinophil % 0.4 %      Basophil % 0.4 %      Immature Grans % 0.8 %      Neutrophils, Absolute 15.16 10*3/mm3      Lymphocytes, Absolute 1.51 10*3/mm3      Monocytes, Absolute 0.83 10*3/mm3      Eosinophils, Absolute 0.08 10*3/mm3      Basophils, Absolute 0.07 10*3/mm3      Immature Grans, Absolute 0.14 10*3/mm3      nRBC 0.0 /100 WBC           I ordered the above labs and reviewed the results    RADIOLOGY  CT Abdomen Pelvis With Contrast   Final Result   CT findings consistent with acute appendicitis as described.   There is no abscess or free air. Moderate hepatic steatosis. Moderately   extensive colonic diverticulosis without evidence of diverticulitis.        This report was finalized on 7/8/2020 5:38 AM by Dr. Nathanael Brooks M.D.               I ordered the above noted radiological studies. Interpreted by radiologist. Discussed with radiologist (Dr. Brooks). Reviewed by me in PACS.       PROCEDURES  Procedures      PROGRESS AND CONSULTS  ED Course as of Jul 10 2242   Wed Jul 08, 2020   0742 COVID-19,[facility] IN-HOUSE, NP SWAB IN TRANSPORT MEDIA 8-12 HR TAT - Swab, Nasopharynx [DB]      ED Course User Index  [DB] Nathanael Vera MD     The patient was wearing a facemask upon entrance into the room and remained in such throughout their visit.  I was wearing PPE including a facemask as well as gloves at any point entering the room and throughout the visit    0600  I did discuss with the patient that she has acute appendicitis by CT scan that will require surgical repair today.  I informed her that we will begin antibiotics and do a test for COVID-19 prior to surgery.  The patient understands and  all questions have been answered.  She states currently that her pain is quite well controlled.    0710  Case discussed with Dr. Beal who agrees to admit the patient for surgical care      MEDICAL DECISION MAKING  Results were reviewed/discussed with the patient and they were also made aware of online access. Pt also made aware that some labs, such as cultures, will not be resulted during ER visit and follow up with PMD is necessary.     MDM  Number of Diagnoses or Management Options  Acute appendicitis with localized peritonitis, without perforation, abscess, or gangrene:      Amount and/or Complexity of Data Reviewed  Clinical lab tests: ordered and reviewed  Tests in the radiology section of CPT®: ordered and reviewed  Tests in the medicine section of CPT®: ordered and reviewed  Review and summarize past medical records: yes (Upon medical records review, the patient was last seen on 1/26/2018 for a hernia repair.)  Discuss the patient with other providers: yes (Dr. Beal who will admit to the hospital for surgical evaluation and care)  Independent visualization of images, tracings, or specimens: yes (CT scan showing acute appendicitis)           DIAGNOSIS  Final diagnoses:   Acute appendicitis with localized peritonitis, without perforation, abscess, or gangrene       DISPOSITION  ADMISSION    Discussed treatment plan and reason for admission with pt/family and admitting physician.  Pt/family voiced understanding of the plan for admission for further testing/treatment as needed.         Latest Documented Vital Signs:  As of 07:15  BP- 151/88 HR- 78 Temp- 97.5 °F (36.4 °C) (Tympanic) O2 sat- 96%         Daniel Raymond MD  07/08/20 5217       Daniel Raymond MD  07/10/20 3239

## 2020-07-08 NOTE — ANESTHESIA PROCEDURE NOTES
Airway  Urgency: elective    Date/Time: 7/8/2020 2:46 PM  Airway not difficult    General Information and Staff    Patient location during procedure: OR  Anesthesiologist: German Barrientos MD  CRNA: Vince Dale CRNA    Indications and Patient Condition  Indications for airway management: airway protection    Preoxygenated: yes  Mask difficulty assessment: 1 - vent by mask    Final Airway Details  Final airway type: endotracheal airway      Successful airway: ETT  Cuffed: yes   Successful intubation technique: direct laryngoscopy  Endotracheal tube insertion site: oral  Blade: Raymond  Blade size: 2  ETT size (mm): 7.0  Cormack-Lehane Classification: grade I - full view of glottis  Placement verified by: chest auscultation and capnometry   Measured from: lips  ETT/EBT  to lips (cm): 22  Number of attempts at approach: 1  Assessment: lips, teeth, and gum same as pre-op and atraumatic intubation    Additional Comments  Pre 02 100%, SIVI, DL x1, atraumatic intubation, BLBS, Positive ETC02.

## 2020-07-08 NOTE — ANESTHESIA PREPROCEDURE EVALUATION
Anesthesia Evaluation     Patient summary reviewed and Nursing notes reviewed   history of anesthetic complications: prolonged sedation               Airway   Mallampati: II  Dental      Pulmonary    (+) a smoker Former, shortness of breath, recent URI, sleep apnea,   Cardiovascular     ECG reviewed  Rhythm: regular  Rate: normal    (+) hypertension, CAD, CABG, hyperlipidemia,  carotid artery disease      Neuro/Psych  (+) numbness, psychiatric history Anxiety and Depression,     GI/Hepatic/Renal/Endo    (+)   renal disease CRI, diabetes mellitus type 2,     Musculoskeletal     Abdominal    Substance History - negative use     OB/GYN negative ob/gyn ROS         Other   arthritis,                      Anesthesia Plan    ASA 3     general     intravenous induction     Anesthetic plan, all risks, benefits, and alternatives have been provided, discussed and informed consent has been obtained with: patient.

## 2020-07-08 NOTE — NURSING NOTE
Upon return from break received report from relief nurse. PT was up attempting to urinate and ;had tried several times earlier in the day. I state I wa ;aware and when the patient wanted to try she would let us know. PT returned to bed unable to go. MACK Hunter rn evaluated [facility] scanner reporting >780 cc  DR Holliday was contacted and given information verbal order received . Daly placed with out difficulty. Pt stated relief and tolerated placement very well

## 2020-07-08 NOTE — TELEPHONE ENCOUNTER
On call message received at 0105: patient with complaints of increased abdominal pain since 9 pm last night. Her pain is located in lower abdomen area. She has not taking anything for symptoms. She denies any nausea/vomiting or diarrhea. She reports never having symptoms like this before. She was advised to go to ER for further workup.

## 2020-07-08 NOTE — OP NOTE
PREOPERATIVE DIAGNOSIS:  Acute Appendicitis  POSTOPERATIVE DIAGNOSIS:  Acute suppurative appendicitis   PROCEDURE:  Laparoscopic Appendectomy  SURGEON/STAFF:  NIYA Beal  ASSISTANT:  JOY Smith CSA  ANESTHESIA:  General.   BLOOD LOSS: Minimal  COUNTS:  Needle and sponge count correct.  SPECIMENS:  Appendix    Findings: Acute appendictis    INDICATIONS FOR OPERATION:  Navi Kinney is a 79 y.o. year old female who presented to to the ED for evaluation of acute appendicitis.    On physical exam, she   was seen to have acute appendicitis.   The risks and benefits of open, conservative and laparoscopic management were discussed at length and in detail with the patient.  she has chosen to undergo laparoscopic appendectomy.     OPERATION:  The patient was brought to the operating room in stable condition.   Preoperative antibiotics were given and sequential compression devices were applied.  At this time, the patient was laid supine on the operating room table.  General anesthesia was induced by the Anesthesia service without difficulty. A davis catheter was placed by the nursing staff. The patient's abdomen was prepped and draped in the usual sterile fashion.      At this time, the patient's abdomen was accessed at the level of the umbilicus with an open cutdown technique and insertion of a 5 mm trocar.  The abdomen was insufflated.  Brief survey of the abdominal cavity revealed no intra-abdominal injury, and a large inflamed appendix.  The operation was continued by insertion of 2 additional 5 mm trocars, one in the mid clavicular line between the asis and umbilicus, and one in the suprapubic region.  The umbilical 5 mm port was replaced with a 12 mm port to allow for specimen removal and stapler passage. These were inserted under direct view.  The appendix was inflamed and adhered to the lateral abdominal wall. The retroappendiceal window was created at the base of the appendix. A single firing of a Stannards Laparoscopic  stapler with a white load was used to transect the appendix at it's base. Here there was viable tissue, that was soft to touch.     The echelon stapler white load to transect the mesentery of the appendix . Metallic clips were used to control bleeding.  Jose De Jesus hemostatic agent was used at the appendiceal bed. The appendix was then removed through the umbilical port site with the aid of an endo catch bag. The bag broke when the appendix was being removed from the umbilicus so there was contamination of the skin. The appendix was completely removed and the skin was cleaned with betadine.   Next, the abdomen was inspected and irrigated.  The field was completely clean with no evidence of intra-abdominal injury or bleeding.  All trocars were then removed under direct vision.  The umbilical fascia was closed with Vicryl suture. The umbilical incision was closed with interrupted 4-0 monocryl and covered with 4 x 4 gauze and tape.  All other skin incisions were closed with 4-0 Monocryl and surgical glue.      The patient was extubated in the recovery room in stable condition.  I, the attending surgeon, performed the entire operation.    Guzman Beal MD  General, Minimally Invasive and Endoscopic Surgery  [facility]    [address], [address]  [place], [place], [address]  P: [phone]  F: [phone]

## 2020-07-08 NOTE — ED TRIAGE NOTES
Pt here Abd pain without nausea/vomiting. Denies constipation or urinary problems. Just the pain more in navel area and lower but sometimes all over

## 2020-07-09 LAB
ANION GAP SERPL CALCULATED.3IONS-SCNC: 11.6 MMOL/L (ref 5–15)
BASOPHILS # BLD AUTO: 0.07 10*3/MM3 (ref 0–0.2)
BASOPHILS NFR BLD AUTO: 0.4 % (ref 0–1.5)
BUN SERPL-MCNC: 11 MG/DL (ref 8–23)
BUN/CREAT SERPL: 11.5 (ref 7–25)
CALCIUM SPEC-SCNC: 8.3 MG/DL (ref 8.6–10.5)
CHLORIDE SERPL-SCNC: 93 MMOL/L (ref 98–107)
CO2 SERPL-SCNC: 25.4 MMOL/L (ref 22–29)
CREAT SERPL-MCNC: 0.96 MG/DL (ref 0.57–1)
DEPRECATED RDW RBC AUTO: 40.2 FL (ref 37–54)
EOSINOPHIL # BLD AUTO: 0 10*3/MM3 (ref 0–0.4)
EOSINOPHIL NFR BLD AUTO: 0 % (ref 0.3–6.2)
ERYTHROCYTE [DISTWIDTH] IN BLOOD BY AUTOMATED COUNT: 12.2 % (ref 12.3–15.4)
GFR SERPL CREATININE-BSD FRML MDRD: 56 ML/MIN/1.73
GLUCOSE BLDC GLUCOMTR-MCNC: 172 MG/DL (ref 70–130)
GLUCOSE BLDC GLUCOMTR-MCNC: 176 MG/DL (ref 70–130)
GLUCOSE BLDC GLUCOMTR-MCNC: 197 MG/DL (ref 70–130)
GLUCOSE BLDC GLUCOMTR-MCNC: 198 MG/DL (ref 70–130)
GLUCOSE SERPL-MCNC: 172 MG/DL (ref 65–99)
HCT VFR BLD AUTO: 34 % (ref 34–46.6)
HGB BLD-MCNC: 11.9 G/DL (ref 12–15.9)
IMM GRANULOCYTES # BLD AUTO: 0.1 10*3/MM3 (ref 0–0.05)
IMM GRANULOCYTES NFR BLD AUTO: 0.6 % (ref 0–0.5)
LYMPHOCYTES # BLD AUTO: 1.12 10*3/MM3 (ref 0.7–3.1)
LYMPHOCYTES NFR BLD AUTO: 6.4 % (ref 19.6–45.3)
MCH RBC QN AUTO: 31.7 PG (ref 26.6–33)
MCHC RBC AUTO-ENTMCNC: 35 G/DL (ref 31.5–35.7)
MCV RBC AUTO: 90.7 FL (ref 79–97)
MONOCYTES # BLD AUTO: 0.78 10*3/MM3 (ref 0.1–0.9)
MONOCYTES NFR BLD AUTO: 4.5 % (ref 5–12)
NEUTROPHILS NFR BLD AUTO: 15.41 10*3/MM3 (ref 1.7–7)
NEUTROPHILS NFR BLD AUTO: 88.1 % (ref 42.7–76)
NRBC BLD AUTO-RTO: 0.1 /100 WBC (ref 0–0.2)
PLATELET # BLD AUTO: 247 10*3/MM3 (ref 140–450)
PMV BLD AUTO: 10.4 FL (ref 6–12)
POTASSIUM SERPL-SCNC: 3.3 MMOL/L (ref 3.5–5.2)
RBC # BLD AUTO: 3.75 10*6/MM3 (ref 3.77–5.28)
SODIUM SERPL-SCNC: 130 MMOL/L (ref 136–145)
WBC # BLD AUTO: 17.48 10*3/MM3 (ref 3.4–10.8)

## 2020-07-09 PROCEDURE — 63710000001 INSULIN REGULAR HUMAN PER 5 UNITS: Performed by: SURGERY

## 2020-07-09 PROCEDURE — 80048 BASIC METABOLIC PNL TOTAL CA: CPT | Performed by: SURGERY

## 2020-07-09 PROCEDURE — G0378 HOSPITAL OBSERVATION PER HR: HCPCS

## 2020-07-09 PROCEDURE — 99024 POSTOP FOLLOW-UP VISIT: CPT | Performed by: SURGERY

## 2020-07-09 PROCEDURE — 82962 GLUCOSE BLOOD TEST: CPT

## 2020-07-09 PROCEDURE — 85025 COMPLETE CBC W/AUTO DIFF WBC: CPT | Performed by: SURGERY

## 2020-07-09 PROCEDURE — 25010000002 PIPERACILLIN SOD-TAZOBACTAM PER 1 G: Performed by: SURGERY

## 2020-07-09 RX ORDER — AMOXICILLIN 250 MG
2 CAPSULE ORAL DAILY PRN
Qty: 30 TABLET | Refills: 1 | Status: SHIPPED | OUTPATIENT
Start: 2020-07-09 | End: 2020-07-15 | Stop reason: HOSPADM

## 2020-07-09 RX ORDER — POTASSIUM CHLORIDE 1.5 G/1.77G
40 POWDER, FOR SOLUTION ORAL ONCE
Status: COMPLETED | OUTPATIENT
Start: 2020-07-09 | End: 2020-07-09

## 2020-07-09 RX ORDER — PROMETHAZINE HYDROCHLORIDE 12.5 MG/1
12.5 TABLET ORAL EVERY 6 HOURS PRN
Qty: 30 TABLET | Refills: 0 | Status: SHIPPED | OUTPATIENT
Start: 2020-07-09 | End: 2020-07-23

## 2020-07-09 RX ORDER — POTASSIUM CHLORIDE 1.5 G/1.77G
40 POWDER, FOR SOLUTION ORAL 2 TIMES DAILY
Status: DISCONTINUED | OUTPATIENT
Start: 2020-07-09 | End: 2020-07-15 | Stop reason: HOSPADM

## 2020-07-09 RX ORDER — AMOXICILLIN AND CLAVULANATE POTASSIUM 875; 125 MG/1; MG/1
1 TABLET, FILM COATED ORAL EVERY 12 HOURS
Qty: 10 TABLET | Refills: 0 | Status: SHIPPED | OUTPATIENT
Start: 2020-07-09 | End: 2020-07-15 | Stop reason: HOSPADM

## 2020-07-09 RX ORDER — HYDROCODONE BITARTRATE AND ACETAMINOPHEN 5; 325 MG/1; MG/1
1 TABLET ORAL EVERY 6 HOURS PRN
Qty: 30 TABLET | Refills: 0 | Status: SHIPPED | OUTPATIENT
Start: 2020-07-09 | End: 2020-07-15 | Stop reason: HOSPADM

## 2020-07-09 RX ADMIN — INSULIN HUMAN 2 UNITS: 100 INJECTION, SOLUTION PARENTERAL at 17:30

## 2020-07-09 RX ADMIN — INSULIN HUMAN 2 UNITS: 100 INJECTION, SOLUTION PARENTERAL at 12:33

## 2020-07-09 RX ADMIN — AMLODIPINE BESYLATE: 2.5 TABLET ORAL at 08:11

## 2020-07-09 RX ADMIN — METOPROLOL SUCCINATE 50 MG: 50 TABLET, EXTENDED RELEASE ORAL at 08:11

## 2020-07-09 RX ADMIN — INSULIN HUMAN 2 UNITS: 100 INJECTION, SOLUTION PARENTERAL at 08:11

## 2020-07-09 RX ADMIN — TAZOBACTAM SODIUM AND PIPERACILLIN SODIUM 3.38 G: 375; 3 INJECTION, SOLUTION INTRAVENOUS at 10:18

## 2020-07-09 RX ADMIN — INSULIN HUMAN 2 UNITS: 100 INJECTION, SOLUTION PARENTERAL at 23:06

## 2020-07-09 RX ADMIN — POTASSIUM CHLORIDE 40 MEQ: 1.5 POWDER, FOR SOLUTION ORAL at 17:30

## 2020-07-09 RX ADMIN — ISOSORBIDE MONONITRATE 30 MG: 30 TABLET ORAL at 21:04

## 2020-07-09 RX ADMIN — POTASSIUM CHLORIDE 40 MEQ: 1.5 POWDER, FOR SOLUTION ORAL at 22:38

## 2020-07-09 RX ADMIN — SODIUM CHLORIDE, PRESERVATIVE FREE 10 ML: 5 INJECTION INTRAVENOUS at 21:05

## 2020-07-09 RX ADMIN — SODIUM CHLORIDE, PRESERVATIVE FREE 10 ML: 5 INJECTION INTRAVENOUS at 08:11

## 2020-07-09 RX ADMIN — TAZOBACTAM SODIUM AND PIPERACILLIN SODIUM 3.38 G: 375; 3 INJECTION, SOLUTION INTRAVENOUS at 03:03

## 2020-07-09 RX ADMIN — DULOXETINE HYDROCHLORIDE 30 MG: 30 CAPSULE, DELAYED RELEASE ORAL at 21:05

## 2020-07-09 RX ADMIN — CHLORTHALIDONE 25 MG: 25 TABLET ORAL at 08:11

## 2020-07-09 NOTE — PROGRESS NOTES
Discharge Planning Assessment  [facility]     Patient Name: Navi Kinney  MRN: [id number]  Today's Date: 7/9/2020    Admit Date: 7/8/2020    Discharge Needs Assessment     Row Name 07/09/20 0826       Living Environment    Lives With  alone    Current Living Arrangements  home/apartment/condo    Primary Care Provided by  self    Provides Primary Care For  no one    Family Caregiver if Needed  child(selina), adult    Family Caregiver Names  Daughter  ( Kerri Tomas [phone]) and son  ( David Ackerman [phone])    Quality of Family Relationships  involved;supportive    Able to Return to Prior Arrangements  yes    Living Arrangement Comments  Pt lives alone in a two story condo but resides primarily on first floor.         Resource/Environmental Concerns    Resource/Environmental Concerns  none    Transportation Concerns  car, none       Transition Planning    Patient/Family Anticipates Transition to  home    Patient/Family Anticipated Services at Transition  none    Transportation Anticipated  family or friend will provide       Discharge Needs Assessment    Readmission Within the Last 30 Days  no previous admission in last 30 days    Concerns to be Addressed  denies needs/concerns at this time    Equipment Currently Used at Home  bath bench    Anticipated Changes Related to Illness  none    Equipment Needed After Discharge  bath bench        Discharge Plan     Row Name 07/09/20 0828       Plan    Plan  Plan home.   HEIDI Martin RN    Patient/Family in Agreement with Plan  yes    Plan Comments  FACE SHEET VERIFIED/ GRULLON SIGNED.   Spoke to pt at bedside.  Pt's PCP is CORNELL Fong.   Pt's next of kin are daughter  ( Kerri Tomas [phone]) and son  ( David Ackerman [phone]).  Pt lives alone in a two story condo but resides primarily on first floor.  Pt is independent with ADL's.  Pt has a bath bench for home use.  Pt gets prescriptions from [facility].  Pt denies any issues affording  medications.  Pt is not current with HH.  Pt has not been in SNF.  Pt denies any needs.   Pt's son will transport pt at discharge.   Plan home .   HEIDI Martin RN        Destination      Coordination has not been started for this encounter.      Durable Medical Equipment      Coordination has not been started for this encounter.      Dialysis/Infusion      Coordination has not been started for this encounter.      Home Medical Care      Coordination has not been started for this encounter.      Therapy      Coordination has not been started for this encounter.      Community Resources      Coordination has not been started for this encounter.          Demographic Summary     Row Name 07/09/20 0826       General Information    Admission Type  observation    Arrived From  PACU/recovery room    Required Notices Provided  Observation Status Notice    Referral Source  admission list    Reason for Consult  discharge planning    Preferred Language  English     Used During This Interaction  no        Functional Status     Row Name 07/09/20 0826       Functional Status    Usual Activity Tolerance  good    Current Activity Tolerance  moderate       Functional Status, IADL    Medications  independent    Meal Preparation  independent    Housekeeping  independent    Laundry  independent    Shopping  independent       Mental Status    General Appearance WDL  WDL       Mental Status Summary    Recent Changes in Mental Status/Cognitive Functioning  no changes        Psychosocial    No documentation.       Abuse/Neglect    No documentation.       Legal    No documentation.       Substance Abuse    No documentation.       Patient Forms    No documentation.           Mariela Martin, RN

## 2020-07-09 NOTE — PROGRESS NOTES
Postoperative day 1 status post laparoscopic appendectomy    S: No events overnight.  Patient is feeling much better.  Denies any nausea or vomiting.  Tolerated a clear liquid diet.  Daly catheter was removed this morning but she has not been able to urinate yet    O:   Vitals:    07/08/20 2044 07/08/20 2300 07/09/20 0300 07/09/20 0714   BP: 137/86 127/77 128/78 124/74   BP Location: Left arm Left arm Left arm Left arm   Patient Position: Lying Lying Lying Lying   Pulse: 114 116  99   Resp:  18 18 18   Temp: 98.8 °F (37.1 °C) 99.6 °F (37.6 °C) 98.8 °F (37.1 °C) 98.3 °F (36.8 °C)   TempSrc: Oral Oral Oral Oral   SpO2: 93% 91% 92% 93%   Weight:       Height:         Alert oriented x3, no acute distress  Abdomen soft, appropriately tender, nondistended, incision clean dry and intact    White blood cell count 17.4, hemoglobin 11.9  Sodium 130, potassium 3.3  CO2 25.4, creatinine 0.9    Blood glucose 198    Postoperative day 1 status post laparoscopic appendectomy.  Feeling much better, tolerating clear liquid diet without any nausea or vomiting.  Pain is well controlled.  Daly catheter removed but has not voided yet.  Discussed with patient about further step.  We will advance the diet and see how she does.  She is able to urinate and can tolerate diet without any nausea and vomiting then she can be discharged home on antibiotics.  Leukocytosis secondary to inflammatory process.  No evidence of ongoing infection.  Hyponatremia and hypokalemia, replacing electrolytes

## 2020-07-09 NOTE — PLAN OF CARE
Problem: Infection, Risk/Actual (Adult)  Goal: Identify Related Risk Factors and Signs and Symptoms  Outcome: Ongoing (interventions implemented as appropriate)  Note:   Pt slept comfortably on and off  Problem: Patient Care Overview  Goal: Plan of Care Review  Outcome: Ongoing (interventions implemented as appropriate)     Problem: Fall Risk (Adult)  Goal: Identify Related Risk Factors and Signs and Symptoms  Outcome: Ongoing (interventions implemented as appropriate)     Problem: Pain, Acute (Adult)  Goal: Identify Related Risk Factors and Signs and Symptoms  Outcome: Ongoing (interventions implemented as appropriate)   , denies pain, transferred to chair with 2assist. C/o nausea after she was transferred to chair but subsided immediately. IVF continous, taking sips of water and some ice chips.  Incisions x2 with derma bond and x1 with gauze, all intact , no drainage. Safety precautions in place. Nad, vss.

## 2020-07-09 NOTE — NURSING NOTE
Patient bladder scanned and had 742mL in her bladder. Patient in and out catheterized and 600mL was returned.

## 2020-07-09 NOTE — DISCHARGE SUMMARY
Admission date: 7/8/2020   Discharge date: 7/15/2020     Admission diagnosis: Acute appendicitis with localized peritonitis, without perforation, abscess, or gangrene [K35.30]     Discharge diagnosis: Acute appendicitis, urinary retention     Procedure:   -Laparoscopic appendectomy    Hospital course: The patient was admitted to the hospital after undergoing laparoscopic appendectomy.  Her postoperative course was consistent with urinary retention that required Daly catheter replacement.  She was started on clear liquid diet that she tolerated initially.  She had nausea and vomiting on postoperative day 3.  Abdominal x-ray show postoperative ileus.  She was kept n.p.o.  Diet was then slowly advanced.  Daly catheter was removed and the patient developed again urinary retention, Daly catheter was replaced and urology was consulted.  Patient tolerated diet and started having bowel function.  Pain was well controlled.  Because of urinary retention she underwent CT scan abdomen pelvis that show postoperative findings and no abscess formation.  She has a small pocket of air at the umbilical incision.  She developed cellulitis around the bellybutton but there was no purulent drainage or fluctuation.  She was started on antibiotic and discharged home in stable condition.     Meds:      Your medication list      START taking these medications      Instructions Last Dose Given Next Dose Due   amoxicillin-clavulanate 875-125 MG per tablet  Commonly known as:  Augmentin      Take 1 tablet by mouth Every 12 (Twelve) Hours for 5 days.       HYDROcodone-acetaminophen 5-325 MG per tablet  Commonly known as:  Norco      Take 1 tablet by mouth Every 6 (Six) Hours As Needed for Moderate Pain  or Severe Pain .       promethazine 12.5 MG tablet  Commonly known as:  PHENERGAN      Take 1 tablet by mouth Every 6 (Six) Hours As Needed for Nausea or Vomiting.       sennosides-docusate 8.6-50 MG per tablet  Commonly known as:   PERICOLACE      Take 2 tablets by mouth Daily As Needed for Constipation.          CHANGE how you take these medications      Instructions Last Dose Given Next Dose Due   isosorbide mononitrate 30 MG 24 hr tablet  Commonly known as:  IMDUR  What changed:  how much to take      Take 1 tablet by mouth Every Night.          CONTINUE taking these medications      Instructions Last Dose Given Next Dose Due   amLODIPine-benazepril 2.5-10 MG per capsule  Commonly known as:  LOTREL 2.5-10      Take 1 capsule by mouth Every Night.       chlorthalidone 25 MG tablet  Commonly known as:  HYGROTON      TAKE ONE TABLET BY MOUTH DAILY       colestipol 1 g tablet  Commonly known as:  COLESTID      Take 2 g by mouth.       DULoxetine 30 MG capsule  Commonly known as:  CYMBALTA      Take 1 capsule by mouth Every Night.       loperamide 2 MG tablet  Commonly known as:  IMODIUM A-D      Take 2 mg by mouth.       metFORMIN  MG 24 hr tablet  Commonly known as:  GLUCOPHAGE-XR      TAKE ONE TABLET BY MOUTH DAILY WITH BREAKFAST       metoprolol succinate XL 50 MG 24 hr tablet  Commonly known as:  TOPROL-XL      TAKE ONE TABLET BY MOUTH NIGHTLY       nitroglycerin 0.4 MG SL tablet  Commonly known as:  NITROSTAT      Place 1 tablet under the tongue Every 5 (Five) Minutes As Needed for Chest Pain.       VITAMIN B-12 PO      Take 500 mcg by mouth Every Night.             Where to Get Your Medications      These medications were sent to [facility] - [place], [place] - [address] [facility] - [phone]  - [phone]   [address] [facility] [address]    Phone:  [phone]   · amoxicillin-clavulanate 875-125 MG per tablet  · HYDROcodone-acetaminophen 5-325 MG per tablet  · promethazine 12.5 MG tablet  · sennosides-docusate 8.6-50 MG per tablet         Instructions:    - No heavy lifting of more than 15 lb for 6 weeks. Can start doing aerobic type exercise in 4 weeks.   - No driving while taking pain medications  -  Take medications as prescribed.   - Can shower, no bath tub    Follow up: Dr. Beal in 1 week, call for appointment      Guzman Beal MD  General, Minimally Invasive and Endoscopic Surgery  [facility]

## 2020-07-09 NOTE — PROGRESS NOTES
Continued Stay Note  [facility]     Patient Name: Navi Kinney  MRN: [id number]  Today's Date: 7/9/2020    Admit Date: 7/8/2020    Discharge Plan     Row Name 07/09/20 0828       Plan    Plan  Plan home.   HEIDI Martin RN    Patient/Family in Agreement with Plan  yes    Plan Comments   FACE SHEET VERIFIED/ GRULLON SIGNED.   Spoke to pt at bedside.  Pt's PCP is CORNELL Fong.   Pt's next of kin are daughter  ( Kerri Tomas [phone]) and son  ( David Ackerman [phone]).  Pt lives alone in a two story condo but resides primarily on first floor.  Pt is independent with ADL's.  Pt has a bath bench for home use.  Pt gets prescriptions from [facility].  Pt denies any issues affording medications.  Pt is not current with HH.  Pt has not been in SNF.  Pt denies any needs.   Pt's son will transport pt at discharge.   Plan home .   HEIDI Martin RN        Discharge Codes    No documentation.             Mariela Martin RN

## 2020-07-09 NOTE — PLAN OF CARE
Problem: Infection, Risk/Actual (Adult)  Goal: Identify Related Risk Factors and Signs and Symptoms  Outcome: Ongoing (interventions implemented as appropriate)  Goal: Infection Prevention/Resolution  Outcome: Ongoing (interventions implemented as appropriate)     Problem: Patient Care Overview  Goal: Plan of Care Review  Outcome: Ongoing (interventions implemented as appropriate)  Flowsheets (Taken 7/9/2020 3213)  Progress: improving  Plan of Care Reviewed With: patient  Outcome Summary: Patient has been pleasant during shift. Slight pain just when moving. Patient has ambulated three times today. Patient needs to tolerate diet and she needs to void before discharge. Daly removed at 0930. IVFs at 75mL/hr. Patient to recieve two doses of potassium. No nausea or SOA. Will continue to monitor and assist patient as needed.  Goal: Individualization and Mutuality  Outcome: Ongoing (interventions implemented as appropriate)  Goal: Discharge Needs Assessment  Outcome: Ongoing (interventions implemented as appropriate)  Goal: Interprofessional Rounds/Family Conf  Outcome: Ongoing (interventions implemented as appropriate)     Problem: Fall Risk (Adult)  Goal: Identify Related Risk Factors and Signs and Symptoms  Outcome: Ongoing (interventions implemented as appropriate)  Goal: Absence of Fall  Outcome: Ongoing (interventions implemented as appropriate)     Problem: Pain, Acute (Adult)  Goal: Identify Related Risk Factors and Signs and Symptoms  Outcome: Ongoing (interventions implemented as appropriate)  Goal: Acceptable Pain Control/Comfort Level  Outcome: Ongoing (interventions implemented as appropriate)

## 2020-07-10 ENCOUNTER — APPOINTMENT (OUTPATIENT)
Dept: GENERAL RADIOLOGY | Facility: HOSPITAL | Age: 80
End: 2020-07-10

## 2020-07-10 PROBLEM — K56.7 POSTOPERATIVE ILEUS (HCC): Status: ACTIVE | Noted: 2020-07-10

## 2020-07-10 PROBLEM — K91.89 POSTOPERATIVE ILEUS (HCC): Status: ACTIVE | Noted: 2020-07-10

## 2020-07-10 LAB
CYTO UR: NORMAL
GLUCOSE BLDC GLUCOMTR-MCNC: 161 MG/DL (ref 70–130)
GLUCOSE BLDC GLUCOMTR-MCNC: 181 MG/DL (ref 70–130)
GLUCOSE BLDC GLUCOMTR-MCNC: 188 MG/DL (ref 70–130)
GLUCOSE BLDC GLUCOMTR-MCNC: 211 MG/DL (ref 70–130)
LAB AP CASE REPORT: NORMAL
PATH REPORT.FINAL DX SPEC: NORMAL
PATH REPORT.GROSS SPEC: NORMAL

## 2020-07-10 PROCEDURE — 99024 POSTOP FOLLOW-UP VISIT: CPT | Performed by: SURGERY

## 2020-07-10 PROCEDURE — G0378 HOSPITAL OBSERVATION PER HR: HCPCS

## 2020-07-10 PROCEDURE — 25010000002 PIPERACILLIN SOD-TAZOBACTAM PER 1 G: Performed by: SURGERY

## 2020-07-10 PROCEDURE — 94799 UNLISTED PULMONARY SVC/PX: CPT

## 2020-07-10 PROCEDURE — 82962 GLUCOSE BLOOD TEST: CPT

## 2020-07-10 PROCEDURE — 63710000001 INSULIN REGULAR HUMAN PER 5 UNITS: Performed by: SURGERY

## 2020-07-10 PROCEDURE — 74019 RADEX ABDOMEN 2 VIEWS: CPT

## 2020-07-10 RX ORDER — DEXTROSE, SODIUM CHLORIDE, AND POTASSIUM CHLORIDE 5; .45; .15 G/100ML; G/100ML; G/100ML
50 INJECTION INTRAVENOUS CONTINUOUS
Status: DISCONTINUED | OUTPATIENT
Start: 2020-07-10 | End: 2020-07-11

## 2020-07-10 RX ORDER — PANTOPRAZOLE SODIUM 40 MG/10ML
40 INJECTION, POWDER, LYOPHILIZED, FOR SOLUTION INTRAVENOUS
Status: DISCONTINUED | OUTPATIENT
Start: 2020-07-10 | End: 2020-07-12

## 2020-07-10 RX ORDER — CALCIUM CARBONATE 200(500)MG
2 TABLET,CHEWABLE ORAL 3 TIMES DAILY PRN
Status: DISCONTINUED | OUTPATIENT
Start: 2020-07-10 | End: 2020-07-15 | Stop reason: HOSPADM

## 2020-07-10 RX ADMIN — INSULIN HUMAN 2 UNITS: 100 INJECTION, SOLUTION PARENTERAL at 17:23

## 2020-07-10 RX ADMIN — CHLORTHALIDONE 25 MG: 25 TABLET ORAL at 08:19

## 2020-07-10 RX ADMIN — SODIUM CHLORIDE, PRESERVATIVE FREE 10 ML: 5 INJECTION INTRAVENOUS at 08:26

## 2020-07-10 RX ADMIN — POTASSIUM CHLORIDE 40 MEQ: 1.5 POWDER, FOR SOLUTION ORAL at 20:05

## 2020-07-10 RX ADMIN — PANTOPRAZOLE SODIUM 40 MG: 40 INJECTION, POWDER, FOR SOLUTION INTRAVENOUS at 15:36

## 2020-07-10 RX ADMIN — SODIUM CHLORIDE, PRESERVATIVE FREE 10 ML: 5 INJECTION INTRAVENOUS at 20:06

## 2020-07-10 RX ADMIN — AMLODIPINE BESYLATE: 2.5 TABLET ORAL at 08:19

## 2020-07-10 RX ADMIN — ANTACID TABLETS 1 TABLET: 500 TABLET, CHEWABLE ORAL at 23:22

## 2020-07-10 RX ADMIN — TAZOBACTAM SODIUM AND PIPERACILLIN SODIUM 3.38 G: 375; 3 INJECTION, SOLUTION INTRAVENOUS at 04:00

## 2020-07-10 RX ADMIN — ANTACID TABLETS 2 TABLET: 500 TABLET, CHEWABLE ORAL at 12:14

## 2020-07-10 RX ADMIN — INSULIN HUMAN 3 UNITS: 100 INJECTION, SOLUTION PARENTERAL at 12:06

## 2020-07-10 RX ADMIN — DULOXETINE HYDROCHLORIDE 30 MG: 30 CAPSULE, DELAYED RELEASE ORAL at 20:05

## 2020-07-10 RX ADMIN — INSULIN HUMAN 2 UNITS: 100 INJECTION, SOLUTION PARENTERAL at 08:26

## 2020-07-10 RX ADMIN — POTASSIUM CHLORIDE, DEXTROSE MONOHYDRATE AND SODIUM CHLORIDE 50 ML/HR: 150; 5; 450 INJECTION, SOLUTION INTRAVENOUS at 13:49

## 2020-07-10 RX ADMIN — POTASSIUM CHLORIDE 40 MEQ: 1.5 POWDER, FOR SOLUTION ORAL at 08:20

## 2020-07-10 RX ADMIN — METOPROLOL SUCCINATE 50 MG: 50 TABLET, EXTENDED RELEASE ORAL at 08:20

## 2020-07-10 RX ADMIN — ISOSORBIDE MONONITRATE 30 MG: 30 TABLET ORAL at 20:05

## 2020-07-10 RX ADMIN — PROMETHAZINE HYDROCHLORIDE 12.5 MG: 12.5 SUPPOSITORY RECTAL at 09:28

## 2020-07-10 NOTE — PROGRESS NOTES
"General Surgery  Progress Note    CC: Follow-up acute appendicitis    POD#2 laparoscopic appendectomy        S: She vomited this morning and has had worsening abdominal distention.  I ordered a KUB this morning, which demonstrated multiple gaseous dilated loops of small bowel consistent with postoperative SBO versus ileus.  She is not passing any flatus or bowel movement yet.  She is complaining of acid reflux, presumably from bile reflux.  She has also struggled with urinary retention requiring in and out catheterizations.    O:/84 (BP Location: Left arm, Patient Position: Lying)   Pulse 97   Temp 96.8 °F (36 °C) (Temporal)   Resp 17   Ht 165.1 cm (65\")   Wt 79.4 kg (175 lb)   LMP  (LMP Unknown)   SpO2 95%   BMI 29.12 kg/m²     Intake & Output:  UOP: 1100 cc/24 hrs    GENERAL: alert, well appearing, and in no distress  HEENT: normocephalic, atraumatic, moist mucous membranes, clear sclerae   CHEST: clear to auscultation, no wheezes, rales or rhonchi, symmetric air entry  CARDIAC: regular rate and rhythm    ABDOMEN: Soft, distended, mild appropriate tenderness, incisions clean/dry/intact with Dermabond  EXTREMITIES: no cyanosis, clubbing, or edema   SKIN: Warm and moist, no rashes    LABS  Results from last 7 days   Lab Units 07/09/20  0538 07/08/20  0405   WBC 10*3/mm3 17.48* 17.79*   HEMOGLOBIN g/dL 11.9* 13.3   HEMATOCRIT % 34.0 39.0   PLATELETS 10*3/mm3 247 278     Results from last 7 days   Lab Units 07/09/20  0538 07/08/20  0405   SODIUM mmol/L 130* 132*   POTASSIUM mmol/L 3.3* 3.9   CHLORIDE mmol/L 93* 95*   CO2 mmol/L 25.4 22.5   BUN mg/dL 11 20   CREATININE mg/dL 0.96 0.83   CALCIUM mg/dL 8.3* 9.9   BILIRUBIN mg/dL  --  0.4   ALK PHOS U/L  --  88   ALT (SGPT) U/L  --  25   AST (SGOT) U/L  --  16   GLUCOSE mg/dL 172* 223*             A/P: 79 y.o. female POD#2 laparoscopic appendectomy for acute appendicitis.     She has developed a postoperative ileus versus SBO.  I have backed her down to " clear liquids and encouraged her to ambulate as much as possible in the halls.  I discussed her symptoms and the nature of the postoperative ileus versus SBO with the patient and her son at the bedside and they expressed understanding.  If she vomits again, she will need a nasogastric tube to low wall suction and will need to be n.p.o. except ice chips thereafter.    Vi Best MD  General and Endoscopic Surgery  [facility]    [address], [address]  [place], [place], [address]  P: [phone]  F: [phone]

## 2020-07-10 NOTE — NURSING NOTE
Called and spoke with Dr. Best. Patient vomited once this am. Abdomen is distended. Dr. Best aware. See new orders.

## 2020-07-10 NOTE — PLAN OF CARE
Problem: Infection, Risk/Actual (Adult)  Goal: Identify Related Risk Factors and Signs and Symptoms  Outcome: Ongoing (interventions implemented as appropriate)  Goal: Infection Prevention/Resolution  Outcome: Ongoing (interventions implemented as appropriate)     Problem: Patient Care Overview  Goal: Plan of Care Review  Outcome: Ongoing (interventions implemented as appropriate)  Flowsheets  Taken 7/10/2020 1646  Progress: no change  Outcome Summary: Patient alert and oriented. Patient denies pain. Vomited once this shift. Nausea med given. Dr. Best aware. On IVF's. Abd distented. MD aware. Vital signs are stable. Ambulated around the nursing station x2. No s/s of acute distress. Will continue to monitor.  Taken 7/10/2020 1205  Plan of Care Reviewed With: patient  Goal: Individualization and Mutuality  Outcome: Ongoing (interventions implemented as appropriate)  Goal: Discharge Needs Assessment  Outcome: Ongoing (interventions implemented as appropriate)  Goal: Interprofessional Rounds/Family Conf  Outcome: Ongoing (interventions implemented as appropriate)     Problem: Fall Risk (Adult)  Goal: Identify Related Risk Factors and Signs and Symptoms  Outcome: Ongoing (interventions implemented as appropriate)  Flowsheets (Taken 7/10/2020 1646)  Signs and Symptoms (Fall Risk): presence of risk factors  Goal: Absence of Fall  Outcome: Ongoing (interventions implemented as appropriate)  Flowsheets (Taken 7/10/2020 1646)  Absence of Fall: making progress toward outcome     Problem: Pain, Acute (Adult)  Goal: Identify Related Risk Factors and Signs and Symptoms  Outcome: Ongoing (interventions implemented as appropriate)  Goal: Acceptable Pain Control/Comfort Level  Outcome: Ongoing (interventions implemented as appropriate)  Flowsheets (Taken 7/10/2020 1646)  Acceptable Pain Control/Comfort Level: making progress toward outcome

## 2020-07-10 NOTE — PLAN OF CARE
Problem: Infection, Risk/Actual (Adult)  Goal: Identify Related Risk Factors and Signs and Symptoms  Outcome: Ongoing (interventions implemented as appropriate)  Note:   PT SLEPT WELL THRU THE NIGHT, NO C/O PAIN VOICED, TAKEN TO TOILET FEW TIMES LAST NIGHT FOR ATTEMPT TO VOID, NO SUCCESS, ABD VERY DISTENDED BUT PT DENIES PRESSURE , BLADDER SCAN 501CC, IN AND OUT CATH DONE AND BRIANNE WELL, OBTAINED 500CC. URINE SHEA COLOR, CLEAR. PT VOICED SHE DEPRESSES AS REASON FOR NOT VOIDING. PT REASSURED AND ENCOURAGED TO GET UP AND USE BR AND INCREASED AMB. VSS.      Problem: Patient Care Overview  Goal: Plan of Care Review  Outcome: Ongoing (interventions implemented as appropriate)     Problem: Fall Risk (Adult)  Goal: Identify Related Risk Factors and Signs and Symptoms  Outcome: Ongoing (interventions implemented as appropriate)     Problem: Pain, Acute (Adult)  Goal: Identify Related Risk Factors and Signs and Symptoms  Outcome: Ongoing (interventions implemented as appropriate)

## 2020-07-11 ENCOUNTER — APPOINTMENT (OUTPATIENT)
Dept: GENERAL RADIOLOGY | Facility: HOSPITAL | Age: 80
End: 2020-07-11

## 2020-07-11 LAB
ANION GAP SERPL CALCULATED.3IONS-SCNC: 9.5 MMOL/L (ref 5–15)
BASOPHILS # BLD AUTO: 0.07 10*3/MM3 (ref 0–0.2)
BASOPHILS NFR BLD AUTO: 0.4 % (ref 0–1.5)
BUN SERPL-MCNC: 23 MG/DL (ref 8–23)
BUN/CREAT SERPL: 19.3 (ref 7–25)
CALCIUM SPEC-SCNC: 9.1 MG/DL (ref 8.6–10.5)
CHLORIDE SERPL-SCNC: 95 MMOL/L (ref 98–107)
CO2 SERPL-SCNC: 23.5 MMOL/L (ref 22–29)
CREAT SERPL-MCNC: 1.19 MG/DL (ref 0.57–1)
DEPRECATED RDW RBC AUTO: 41.3 FL (ref 37–54)
EOSINOPHIL # BLD AUTO: 0.17 10*3/MM3 (ref 0–0.4)
EOSINOPHIL NFR BLD AUTO: 0.9 % (ref 0.3–6.2)
ERYTHROCYTE [DISTWIDTH] IN BLOOD BY AUTOMATED COUNT: 12.5 % (ref 12.3–15.4)
GFR SERPL CREATININE-BSD FRML MDRD: 44 ML/MIN/1.73
GLUCOSE BLDC GLUCOMTR-MCNC: 146 MG/DL (ref 70–130)
GLUCOSE BLDC GLUCOMTR-MCNC: 176 MG/DL (ref 70–130)
GLUCOSE BLDC GLUCOMTR-MCNC: 255 MG/DL (ref 70–130)
GLUCOSE SERPL-MCNC: 167 MG/DL (ref 65–99)
HCT VFR BLD AUTO: 38.9 % (ref 34–46.6)
HGB BLD-MCNC: 13.3 G/DL (ref 12–15.9)
IMM GRANULOCYTES # BLD AUTO: 0.18 10*3/MM3 (ref 0–0.05)
IMM GRANULOCYTES NFR BLD AUTO: 1 % (ref 0–0.5)
LYMPHOCYTES # BLD AUTO: 1.61 10*3/MM3 (ref 0.7–3.1)
LYMPHOCYTES NFR BLD AUTO: 8.9 % (ref 19.6–45.3)
MCH RBC QN AUTO: 31.3 PG (ref 26.6–33)
MCHC RBC AUTO-ENTMCNC: 34.2 G/DL (ref 31.5–35.7)
MCV RBC AUTO: 91.5 FL (ref 79–97)
MONOCYTES # BLD AUTO: 1.05 10*3/MM3 (ref 0.1–0.9)
MONOCYTES NFR BLD AUTO: 5.8 % (ref 5–12)
NEUTROPHILS NFR BLD AUTO: 15.09 10*3/MM3 (ref 1.7–7)
NEUTROPHILS NFR BLD AUTO: 83 % (ref 42.7–76)
NRBC BLD AUTO-RTO: 0 /100 WBC (ref 0–0.2)
PLATELET # BLD AUTO: 293 10*3/MM3 (ref 140–450)
PMV BLD AUTO: 10.1 FL (ref 6–12)
POTASSIUM SERPL-SCNC: 4.5 MMOL/L (ref 3.5–5.2)
RBC # BLD AUTO: 4.25 10*6/MM3 (ref 3.77–5.28)
SODIUM SERPL-SCNC: 128 MMOL/L (ref 136–145)
WBC # BLD AUTO: 18.17 10*3/MM3 (ref 3.4–10.8)

## 2020-07-11 PROCEDURE — 74018 RADEX ABDOMEN 1 VIEW: CPT

## 2020-07-11 PROCEDURE — 82962 GLUCOSE BLOOD TEST: CPT

## 2020-07-11 PROCEDURE — G0378 HOSPITAL OBSERVATION PER HR: HCPCS

## 2020-07-11 PROCEDURE — 99024 POSTOP FOLLOW-UP VISIT: CPT | Performed by: SURGERY

## 2020-07-11 PROCEDURE — 85025 COMPLETE CBC W/AUTO DIFF WBC: CPT | Performed by: SURGERY

## 2020-07-11 PROCEDURE — 80048 BASIC METABOLIC PNL TOTAL CA: CPT | Performed by: SURGERY

## 2020-07-11 PROCEDURE — 63710000001 INSULIN REGULAR HUMAN PER 5 UNITS: Performed by: SURGERY

## 2020-07-11 RX ORDER — DOCUSATE SODIUM 100 MG/1
100 CAPSULE, LIQUID FILLED ORAL 2 TIMES DAILY
Status: DISCONTINUED | OUTPATIENT
Start: 2020-07-11 | End: 2020-07-15 | Stop reason: HOSPADM

## 2020-07-11 RX ORDER — DEXTROSE AND SODIUM CHLORIDE 5; .9 G/100ML; G/100ML
50 INJECTION, SOLUTION INTRAVENOUS CONTINUOUS
Status: DISCONTINUED | OUTPATIENT
Start: 2020-07-11 | End: 2020-07-13

## 2020-07-11 RX ORDER — OXYCODONE HYDROCHLORIDE AND ACETAMINOPHEN 5; 325 MG/1; MG/1
1 TABLET ORAL EVERY 4 HOURS PRN
Status: DISCONTINUED | OUTPATIENT
Start: 2020-07-11 | End: 2020-07-15 | Stop reason: HOSPADM

## 2020-07-11 RX ADMIN — ISOSORBIDE MONONITRATE 30 MG: 30 TABLET ORAL at 20:00

## 2020-07-11 RX ADMIN — CHLORTHALIDONE 25 MG: 25 TABLET ORAL at 10:27

## 2020-07-11 RX ADMIN — MAGNESIUM HYDROXIDE 10 ML: 2400 SUSPENSION ORAL at 17:34

## 2020-07-11 RX ADMIN — INSULIN HUMAN 2 UNITS: 100 INJECTION, SOLUTION PARENTERAL at 06:49

## 2020-07-11 RX ADMIN — DEXTROSE AND SODIUM CHLORIDE 50 ML/HR: 5; 900 INJECTION, SOLUTION INTRAVENOUS at 16:08

## 2020-07-11 RX ADMIN — METOPROLOL SUCCINATE 50 MG: 50 TABLET, EXTENDED RELEASE ORAL at 10:27

## 2020-07-11 RX ADMIN — AMLODIPINE BESYLATE: 2.5 TABLET ORAL at 10:27

## 2020-07-11 RX ADMIN — PANTOPRAZOLE SODIUM 40 MG: 40 INJECTION, POWDER, FOR SOLUTION INTRAVENOUS at 10:28

## 2020-07-11 RX ADMIN — INSULIN HUMAN 4 UNITS: 100 INJECTION, SOLUTION PARENTERAL at 17:34

## 2020-07-11 RX ADMIN — INSULIN HUMAN 2 UNITS: 100 INJECTION, SOLUTION PARENTERAL at 13:29

## 2020-07-11 RX ADMIN — DULOXETINE HYDROCHLORIDE 30 MG: 30 CAPSULE, DELAYED RELEASE ORAL at 20:00

## 2020-07-11 RX ADMIN — SODIUM CHLORIDE, PRESERVATIVE FREE 10 ML: 5 INJECTION INTRAVENOUS at 10:29

## 2020-07-11 RX ADMIN — OXYCODONE HYDROCHLORIDE AND ACETAMINOPHEN 1 TABLET: 5; 325 TABLET ORAL at 19:53

## 2020-07-11 NOTE — PLAN OF CARE
KUB was performed which showed improvement from yesterday according to Dr. Best saying gas was able to move around through to the colon. Milk of magnesia, and colace started. Patient ambulated 2x today & understands the importance of bowel motility related to physical activity. No Bms today. No vomiting but intermittent nausea present refused medication. No S/S of acute distress noted.

## 2020-07-11 NOTE — PLAN OF CARE
"Pt resting comfortably. Pt states no further nausea \"since this am\" does remind nurse she has IBS. No c/o voiced at this time. Denies need to void. Bladder scanned at 179. Encouraged to void. VSS cont to monitor.  Problem: Patient Care Overview  Goal: Plan of Care Review  Outcome: Ongoing (interventions implemented as appropriate)  Goal: Individualization and Mutuality  Outcome: Ongoing (interventions implemented as appropriate)  Goal: Discharge Needs Assessment  Outcome: Ongoing (interventions implemented as appropriate)  Goal: Interprofessional Rounds/Family Conf  Outcome: Ongoing (interventions implemented as appropriate)     "

## 2020-07-11 NOTE — PROGRESS NOTES
"General Surgery  Progress Note    CC: Follow-up acute appendicitis    POD#3 laparoscopic appendectomy        S: She has had no further vomiting and reports only mild intermittent nausea.  She is not passing any flatus or bowel movement yet.  A catheter was replaced last night due to persistent urinary retention.    O:/70   Pulse 106   Temp 97 °F (36.1 °C) (Oral)   Resp 16   Ht 165.1 cm (65\")   Wt 79.4 kg (175 lb)   LMP  (LMP Unknown)   SpO2 92%   BMI 29.12 kg/m²     Intake & Output:  UOP: 850 cc so far today, nothing recorded yesterday    GENERAL: alert, well appearing, and in no distress  HEENT: normocephalic, atraumatic, moist mucous membranes, clear sclerae   CHEST: clear to auscultation, no wheezes, rales or rhonchi, symmetric air entry  CARDIAC: regular rate and rhythm    ABDOMEN: Soft, distended, mild appropriate tenderness, incisions clean/dry/intact with Dermabond  EXTREMITIES: no cyanosis, clubbing, or edema   SKIN: Warm and moist, no rashes    LABS  Results from last 7 days   Lab Units 07/11/20  0518 07/09/20  0538 07/08/20  0405   WBC 10*3/mm3 18.17* 17.48* 17.79*   HEMOGLOBIN g/dL 13.3 11.9* 13.3   HEMATOCRIT % 38.9 34.0 39.0   PLATELETS 10*3/mm3 293 247 278     Results from last 7 days   Lab Units 07/11/20  0518 07/09/20  0538 07/08/20  0405   SODIUM mmol/L 128* 130* 132*   POTASSIUM mmol/L 4.5 3.3* 3.9   CHLORIDE mmol/L 95* 93* 95*   CO2 mmol/L 23.5 25.4 22.5   BUN mg/dL 23 11 20   CREATININE mg/dL 1.19* 0.96 0.83   CALCIUM mg/dL 9.1 8.3* 9.9   BILIRUBIN mg/dL  --   --  0.4   ALK PHOS U/L  --   --  88   ALT (SGPT) U/L  --   --  25   AST (SGOT) U/L  --   --  16   GLUCOSE mg/dL 167* 172* 223*             A/P: 79 y.o. female POD#3 laparoscopic appendectomy for acute appendicitis.     The repeat KUB this morning shows a little more gas in the colon, suggesting that her postoperative ileus is improving.  I have advanced her back to clear liquids and encouraged her to increase the amount of " ambulation daily.  Her sodium has been dropping, so I have switched her to D5 NS given that her creatinine has also been gradually increasing.  I will repeat a CBC and BMP on her tomorrow.    Vi Best MD  General and Endoscopic Surgery  [facility]    [address], [address]  [place], [place], [address]  P: [phone]  F: [phone]

## 2020-07-12 ENCOUNTER — APPOINTMENT (OUTPATIENT)
Dept: GENERAL RADIOLOGY | Facility: HOSPITAL | Age: 80
End: 2020-07-12

## 2020-07-12 LAB
ANION GAP SERPL CALCULATED.3IONS-SCNC: 9.9 MMOL/L (ref 5–15)
BASOPHILS # BLD AUTO: 0.07 10*3/MM3 (ref 0–0.2)
BASOPHILS NFR BLD AUTO: 0.6 % (ref 0–1.5)
BUN SERPL-MCNC: 21 MG/DL (ref 8–23)
BUN/CREAT SERPL: 18.3 (ref 7–25)
CALCIUM SPEC-SCNC: 9.5 MG/DL (ref 8.6–10.5)
CHLORIDE SERPL-SCNC: 96 MMOL/L (ref 98–107)
CO2 SERPL-SCNC: 24.1 MMOL/L (ref 22–29)
CREAT SERPL-MCNC: 1.15 MG/DL (ref 0.57–1)
DEPRECATED RDW RBC AUTO: 40.9 FL (ref 37–54)
EOSINOPHIL # BLD AUTO: 0.41 10*3/MM3 (ref 0–0.4)
EOSINOPHIL NFR BLD AUTO: 3.4 % (ref 0.3–6.2)
ERYTHROCYTE [DISTWIDTH] IN BLOOD BY AUTOMATED COUNT: 12.4 % (ref 12.3–15.4)
GFR SERPL CREATININE-BSD FRML MDRD: 46 ML/MIN/1.73
GLUCOSE BLDC GLUCOMTR-MCNC: 160 MG/DL (ref 70–130)
GLUCOSE BLDC GLUCOMTR-MCNC: 165 MG/DL (ref 70–130)
GLUCOSE BLDC GLUCOMTR-MCNC: 171 MG/DL (ref 70–130)
GLUCOSE SERPL-MCNC: 165 MG/DL (ref 65–99)
HCT VFR BLD AUTO: 35.3 % (ref 34–46.6)
HGB BLD-MCNC: 12.2 G/DL (ref 12–15.9)
IMM GRANULOCYTES # BLD AUTO: 0.25 10*3/MM3 (ref 0–0.05)
IMM GRANULOCYTES NFR BLD AUTO: 2.1 % (ref 0–0.5)
LYMPHOCYTES # BLD AUTO: 1.56 10*3/MM3 (ref 0.7–3.1)
LYMPHOCYTES NFR BLD AUTO: 12.9 % (ref 19.6–45.3)
MCH RBC QN AUTO: 31.5 PG (ref 26.6–33)
MCHC RBC AUTO-ENTMCNC: 34.6 G/DL (ref 31.5–35.7)
MCV RBC AUTO: 91.2 FL (ref 79–97)
MONOCYTES # BLD AUTO: 0.92 10*3/MM3 (ref 0.1–0.9)
MONOCYTES NFR BLD AUTO: 7.6 % (ref 5–12)
NEUTROPHILS NFR BLD AUTO: 73.4 % (ref 42.7–76)
NEUTROPHILS NFR BLD AUTO: 8.88 10*3/MM3 (ref 1.7–7)
NRBC BLD AUTO-RTO: 0 /100 WBC (ref 0–0.2)
PLATELET # BLD AUTO: 284 10*3/MM3 (ref 140–450)
PMV BLD AUTO: 9.7 FL (ref 6–12)
POTASSIUM SERPL-SCNC: 4 MMOL/L (ref 3.5–5.2)
RBC # BLD AUTO: 3.87 10*6/MM3 (ref 3.77–5.28)
SODIUM SERPL-SCNC: 130 MMOL/L (ref 136–145)
WBC # BLD AUTO: 12.09 10*3/MM3 (ref 3.4–10.8)

## 2020-07-12 PROCEDURE — 99024 POSTOP FOLLOW-UP VISIT: CPT | Performed by: SURGERY

## 2020-07-12 PROCEDURE — 82962 GLUCOSE BLOOD TEST: CPT

## 2020-07-12 PROCEDURE — 85025 COMPLETE CBC W/AUTO DIFF WBC: CPT | Performed by: SURGERY

## 2020-07-12 PROCEDURE — 80048 BASIC METABOLIC PNL TOTAL CA: CPT | Performed by: SURGERY

## 2020-07-12 PROCEDURE — 63710000001 INSULIN REGULAR HUMAN PER 5 UNITS: Performed by: SURGERY

## 2020-07-12 PROCEDURE — 74018 RADEX ABDOMEN 1 VIEW: CPT

## 2020-07-12 PROCEDURE — G0378 HOSPITAL OBSERVATION PER HR: HCPCS

## 2020-07-12 RX ORDER — PANTOPRAZOLE SODIUM 40 MG/1
40 TABLET, DELAYED RELEASE ORAL
Status: DISCONTINUED | OUTPATIENT
Start: 2020-07-13 | End: 2020-07-15 | Stop reason: HOSPADM

## 2020-07-12 RX ORDER — BISACODYL 10 MG
10 SUPPOSITORY, RECTAL RECTAL DAILY
Status: DISCONTINUED | OUTPATIENT
Start: 2020-07-12 | End: 2020-07-15 | Stop reason: HOSPADM

## 2020-07-12 RX ADMIN — ISOSORBIDE MONONITRATE 30 MG: 30 TABLET ORAL at 21:08

## 2020-07-12 RX ADMIN — CHLORTHALIDONE 25 MG: 25 TABLET ORAL at 08:08

## 2020-07-12 RX ADMIN — INSULIN HUMAN 2 UNITS: 100 INJECTION, SOLUTION PARENTERAL at 08:07

## 2020-07-12 RX ADMIN — BISACODYL 10 MG: 10 SUPPOSITORY RECTAL at 15:23

## 2020-07-12 RX ADMIN — SODIUM CHLORIDE, PRESERVATIVE FREE 10 ML: 5 INJECTION INTRAVENOUS at 08:08

## 2020-07-12 RX ADMIN — PANTOPRAZOLE SODIUM 40 MG: 40 INJECTION, POWDER, FOR SOLUTION INTRAVENOUS at 05:19

## 2020-07-12 RX ADMIN — INSULIN HUMAN 2 UNITS: 100 INJECTION, SOLUTION PARENTERAL at 17:20

## 2020-07-12 RX ADMIN — METOPROLOL SUCCINATE 50 MG: 50 TABLET, EXTENDED RELEASE ORAL at 08:08

## 2020-07-12 RX ADMIN — AMLODIPINE BESYLATE: 2.5 TABLET ORAL at 08:08

## 2020-07-12 RX ADMIN — DOCUSATE SODIUM 100 MG: 100 CAPSULE, LIQUID FILLED ORAL at 21:08

## 2020-07-12 RX ADMIN — SODIUM CHLORIDE, PRESERVATIVE FREE 10 ML: 5 INJECTION INTRAVENOUS at 21:09

## 2020-07-12 RX ADMIN — POTASSIUM CHLORIDE 40 MEQ: 1.5 POWDER, FOR SOLUTION ORAL at 21:08

## 2020-07-12 RX ADMIN — POTASSIUM CHLORIDE 40 MEQ: 1.5 POWDER, FOR SOLUTION ORAL at 08:08

## 2020-07-12 RX ADMIN — DULOXETINE HYDROCHLORIDE 30 MG: 30 CAPSULE, DELAYED RELEASE ORAL at 21:08

## 2020-07-12 RX ADMIN — INSULIN HUMAN 2 UNITS: 100 INJECTION, SOLUTION PARENTERAL at 11:52

## 2020-07-12 RX ADMIN — MAGNESIUM HYDROXIDE 10 ML: 2400 SUSPENSION ORAL at 08:07

## 2020-07-12 RX ADMIN — DEXTROSE AND SODIUM CHLORIDE 50 ML/HR: 5; 900 INJECTION, SOLUTION INTRAVENOUS at 23:59

## 2020-07-12 RX ADMIN — DOCUSATE SODIUM 100 MG: 100 CAPSULE, LIQUID FILLED ORAL at 08:08

## 2020-07-12 NOTE — PLAN OF CARE
Pt denies pain, lap sites cdi, had shower today, tolerating full liquid diet, has passed some gas, given suppository, waiting for that to take effect, given ivf, vss, will ctm.      Problem: Patient Care Overview  Goal: Plan of Care Review  Outcome: Ongoing (interventions implemented as appropriate)

## 2020-07-12 NOTE — PROGRESS NOTES
"General Surgery  Progress Note    CC: Follow-up acute appendicitis    POD#4 laparoscopic appendectomy        S: She again has not passed any flatus or bowel movement yet.  She has not had any vomiting in 48 hours and says that her nausea is improving.  She tolerated clear liquids yesterday.    O:/75 (BP Location: Left arm, Patient Position: Lying)   Pulse 87   Temp 97.3 °F (36.3 °C) (Temporal)   Resp 16   Ht 165.1 cm (65\")   Wt 79.4 kg (175 lb)   LMP  (LMP Unknown)   SpO2 93%   BMI 29.12 kg/m²     Intake & Output:  UOP: 1450 cc/24 hrs    GENERAL: alert, well appearing, and in no distress  HEENT: normocephalic, atraumatic, moist mucous membranes, clear sclerae   CHEST: clear to auscultation, no wheezes, rales or rhonchi, symmetric air entry  CARDIAC: regular rate and rhythm    ABDOMEN: Soft, distended, mild appropriate tenderness, incisions clean/dry/intact with Dermabond  EXTREMITIES: no cyanosis, clubbing, or edema   SKIN: Warm and moist, no rashes    LABS  Results from last 7 days   Lab Units 07/12/20  0539 07/11/20  0518 07/09/20  0538   WBC 10*3/mm3 12.09* 18.17* 17.48*   HEMOGLOBIN g/dL 12.2 13.3 11.9*   HEMATOCRIT % 35.3 38.9 34.0   PLATELETS 10*3/mm3 284 293 247     Results from last 7 days   Lab Units 07/12/20  0539 07/11/20  0518 07/09/20  0538 07/08/20  0405   SODIUM mmol/L 130* 128* 130* 132*   POTASSIUM mmol/L 4.0 4.5 3.3* 3.9   CHLORIDE mmol/L 96* 95* 93* 95*   CO2 mmol/L 24.1 23.5 25.4 22.5   BUN mg/dL 21 23 11 20   CREATININE mg/dL 1.15* 1.19* 0.96 0.83   CALCIUM mg/dL 9.5 9.1 8.3* 9.9   BILIRUBIN mg/dL  --   --   --  0.4   ALK PHOS U/L  --   --   --  88   ALT (SGPT) U/L  --   --   --  25   AST (SGOT) U/L  --   --   --  16   GLUCOSE mg/dL 165* 167* 172* 223*             A/P: 79 y.o. female POD#4 laparoscopic appendectomy for acute appendicitis.     Her follow-up KUB this morning appears unchanged from yesterday, but there is gas contained in the colon suggesting this is more of a " postoperative ileus as opposed to a small bowel obstruction.  Clinically she looks to be improved so I have advanced her to full liquids.  I have started Colace and milk of magnesia yesterday and I recommended we try Dulcolax suppository today to encourage resumption of bowel function.  She has been up walking more and understands that this is the only factor she has any control over with respect to advancing her care.    Vi Best MD  General and Endoscopic Surgery  [facility]    [address], [address]  [place], [place], [address]  P: [phone]  F: [phone]

## 2020-07-12 NOTE — PLAN OF CARE
Pt walking w walker. Increased mobility encouraged. No c/o nausea or other stomach pain or discomfort. VSS. Cont to monitor.  Problem: Infection, Risk/Actual (Adult)  Goal: Identify Related Risk Factors and Signs and Symptoms  Outcome: Ongoing (interventions implemented as appropriate)  Goal: Infection Prevention/Resolution  Outcome: Ongoing (interventions implemented as appropriate)     Problem: Patient Care Overview  Goal: Plan of Care Review  Outcome: Ongoing (interventions implemented as appropriate)  Goal: Individualization and Mutuality  Outcome: Ongoing (interventions implemented as appropriate)  Goal: Discharge Needs Assessment  Outcome: Ongoing (interventions implemented as appropriate)  Goal: Interprofessional Rounds/Family Conf  Outcome: Ongoing (interventions implemented as appropriate)

## 2020-07-13 LAB
ANION GAP SERPL CALCULATED.3IONS-SCNC: 12.3 MMOL/L (ref 5–15)
BASOPHILS # BLD AUTO: 0.07 10*3/MM3 (ref 0–0.2)
BASOPHILS NFR BLD AUTO: 0.6 % (ref 0–1.5)
BUN SERPL-MCNC: 13 MG/DL (ref 8–23)
BUN/CREAT SERPL: 13.4 (ref 7–25)
CALCIUM SPEC-SCNC: 9.5 MG/DL (ref 8.6–10.5)
CHLORIDE SERPL-SCNC: 96 MMOL/L (ref 98–107)
CO2 SERPL-SCNC: 26.7 MMOL/L (ref 22–29)
CREAT SERPL-MCNC: 0.97 MG/DL (ref 0.57–1)
DEPRECATED RDW RBC AUTO: 41.2 FL (ref 37–54)
EOSINOPHIL # BLD AUTO: 0.26 10*3/MM3 (ref 0–0.4)
EOSINOPHIL NFR BLD AUTO: 2.3 % (ref 0.3–6.2)
ERYTHROCYTE [DISTWIDTH] IN BLOOD BY AUTOMATED COUNT: 12.6 % (ref 12.3–15.4)
GFR SERPL CREATININE-BSD FRML MDRD: 56 ML/MIN/1.73
GLUCOSE BLDC GLUCOMTR-MCNC: 129 MG/DL (ref 70–130)
GLUCOSE BLDC GLUCOMTR-MCNC: 139 MG/DL (ref 70–130)
GLUCOSE BLDC GLUCOMTR-MCNC: 155 MG/DL (ref 70–130)
GLUCOSE BLDC GLUCOMTR-MCNC: 171 MG/DL (ref 70–130)
GLUCOSE BLDC GLUCOMTR-MCNC: 173 MG/DL (ref 70–130)
GLUCOSE SERPL-MCNC: 139 MG/DL (ref 65–99)
HCT VFR BLD AUTO: 36.1 % (ref 34–46.6)
HGB BLD-MCNC: 12.3 G/DL (ref 12–15.9)
IMM GRANULOCYTES # BLD AUTO: 0.22 10*3/MM3 (ref 0–0.05)
IMM GRANULOCYTES NFR BLD AUTO: 1.9 % (ref 0–0.5)
LYMPHOCYTES # BLD AUTO: 1.6 10*3/MM3 (ref 0.7–3.1)
LYMPHOCYTES NFR BLD AUTO: 14.1 % (ref 19.6–45.3)
MCH RBC QN AUTO: 31.2 PG (ref 26.6–33)
MCHC RBC AUTO-ENTMCNC: 34.1 G/DL (ref 31.5–35.7)
MCV RBC AUTO: 91.6 FL (ref 79–97)
MONOCYTES # BLD AUTO: 0.89 10*3/MM3 (ref 0.1–0.9)
MONOCYTES NFR BLD AUTO: 7.8 % (ref 5–12)
NEUTROPHILS NFR BLD AUTO: 73.3 % (ref 42.7–76)
NEUTROPHILS NFR BLD AUTO: 8.34 10*3/MM3 (ref 1.7–7)
NRBC BLD AUTO-RTO: 0 /100 WBC (ref 0–0.2)
PLATELET # BLD AUTO: 328 10*3/MM3 (ref 140–450)
PMV BLD AUTO: 9.4 FL (ref 6–12)
POTASSIUM SERPL-SCNC: 3.8 MMOL/L (ref 3.5–5.2)
RBC # BLD AUTO: 3.94 10*6/MM3 (ref 3.77–5.28)
SODIUM SERPL-SCNC: 135 MMOL/L (ref 136–145)
WBC # BLD AUTO: 11.38 10*3/MM3 (ref 3.4–10.8)

## 2020-07-13 PROCEDURE — 63710000001 INSULIN REGULAR HUMAN PER 5 UNITS: Performed by: SURGERY

## 2020-07-13 PROCEDURE — 80048 BASIC METABOLIC PNL TOTAL CA: CPT | Performed by: SURGERY

## 2020-07-13 PROCEDURE — 82962 GLUCOSE BLOOD TEST: CPT

## 2020-07-13 PROCEDURE — 99024 POSTOP FOLLOW-UP VISIT: CPT | Performed by: SURGERY

## 2020-07-13 PROCEDURE — 85025 COMPLETE CBC W/AUTO DIFF WBC: CPT | Performed by: SURGERY

## 2020-07-13 RX ADMIN — SODIUM CHLORIDE, PRESERVATIVE FREE 10 ML: 5 INJECTION INTRAVENOUS at 20:08

## 2020-07-13 RX ADMIN — CHLORTHALIDONE 25 MG: 25 TABLET ORAL at 07:46

## 2020-07-13 RX ADMIN — PANTOPRAZOLE SODIUM 40 MG: 40 TABLET, DELAYED RELEASE ORAL at 06:07

## 2020-07-13 RX ADMIN — BISACODYL 10 MG: 10 SUPPOSITORY RECTAL at 10:10

## 2020-07-13 RX ADMIN — METOPROLOL SUCCINATE 50 MG: 50 TABLET, EXTENDED RELEASE ORAL at 07:46

## 2020-07-13 RX ADMIN — ISOSORBIDE MONONITRATE 30 MG: 30 TABLET ORAL at 20:07

## 2020-07-13 RX ADMIN — POTASSIUM CHLORIDE 40 MEQ: 1.5 POWDER, FOR SOLUTION ORAL at 20:07

## 2020-07-13 RX ADMIN — DOCUSATE SODIUM 100 MG: 100 CAPSULE, LIQUID FILLED ORAL at 07:46

## 2020-07-13 RX ADMIN — AMLODIPINE BESYLATE: 2.5 TABLET ORAL at 07:45

## 2020-07-13 RX ADMIN — DULOXETINE HYDROCHLORIDE 30 MG: 30 CAPSULE, DELAYED RELEASE ORAL at 20:07

## 2020-07-13 RX ADMIN — INSULIN HUMAN 2 UNITS: 100 INJECTION, SOLUTION PARENTERAL at 01:54

## 2020-07-13 RX ADMIN — DOCUSATE SODIUM 100 MG: 100 CAPSULE, LIQUID FILLED ORAL at 20:07

## 2020-07-13 RX ADMIN — SODIUM CHLORIDE, PRESERVATIVE FREE 10 ML: 5 INJECTION INTRAVENOUS at 07:47

## 2020-07-13 RX ADMIN — MAGNESIUM HYDROXIDE 10 ML: 2400 SUSPENSION ORAL at 07:46

## 2020-07-13 RX ADMIN — POTASSIUM CHLORIDE 40 MEQ: 1.5 POWDER, FOR SOLUTION ORAL at 07:46

## 2020-07-13 RX ADMIN — INSULIN HUMAN 2 UNITS: 100 INJECTION, SOLUTION PARENTERAL at 07:45

## 2020-07-13 NOTE — PROGRESS NOTES
POD#5 laparoscopic appendectomy     S: No events overnight.  Patient tolerating full liquid diet.  Have one bowel movement yesterday.  Abdominal pain is improving.  Denies any nausea or vomiting    O:   Vitals:    07/12/20 1910 07/12/20 2108 07/12/20 2345 07/13/20 0716   BP: 122/84  125/81 136/82   BP Location:   Left arm Left arm   Patient Position: Lying  Lying Sitting   Pulse: 95 86 94 93   Resp: 16  16 16   Temp: 95.7 °F (35.4 °C)  98.2 °F (36.8 °C) 98.2 °F (36.8 °C)   TempSrc: Skin  Oral Oral   SpO2: 95%  94% 93%   Weight:       Height:         Urine output 1.9 L  Stool x1  Alert oriented x3, no acute distress  Breathing comfortable  Abdomen soft, slightly tender to palpation mostly at incisions, no rebound or guarding no peritoneal signs, mild distention.  Incisions clean dry and intact    No labs today    POD#5 laparoscopic appendectomy with postoperative ileus that clinically is resolving.  Hyponatremia, IV fluids, elevated creatinine.  She is feeling much better.  Tolerating full liquid diet.  Continues to have Daly catheter due to urinary retention.  Has been making adequate urine.     -Advance to cardiac diabetic diet  -Labs today  -Remove Daly catheter today for voiding trial  -Hopefully home tomorrow    Guzman Beal MD, FACS  General, Minimally Invasive and Endoscopic Surgery  [facility]    [address], [address]  [place], [place], [address]  P: [phone]  F: [phone]

## 2020-07-13 NOTE — PROGRESS NOTES
Continued Stay Note  [facility]     Patient Name: Navi Kinney  MRN: [id number]  Today's Date: 7/13/2020    Admit Date: 7/8/2020    Discharge Plan     Row Name 07/13/20 1200       Plan    Plan  return home- sister will be staying with her and family will provide transportation at dc    Plan Comments  Spoke with patient at bedside.  She confirms that plan is home at dc and states that her sister will be staying with her.  She has a walker for home use if needed.  She denies any needs and states that family will transport home at dc. Shabana Aguirre RN        Discharge Codes    No documentation.       Expected Discharge Date and Time     Expected Discharge Date Expected Discharge Time    Jul 9, 2020             Shabana Aguirre RN

## 2020-07-13 NOTE — PLAN OF CARE
Problem: Infection, Risk/Actual (Adult)  Goal: Identify Related Risk Factors and Signs and Symptoms  Outcome: Ongoing (interventions implemented as appropriate)  Note:   Pt amb in hallways x240ft, chey well, up to br x2, bmx1 very small, hard nhung. Denies n/v, c/o  rlq pain but refused prn pain  med, pain subsided later after 2hr sleep. VSS,NAD, SR on tele. Abd remains distended sluggish bowel sounds. Safety precautions in place, afebrile this shift. Cont to monitor.      Problem: Patient Care Overview  Goal: Plan of Care Review  Outcome: Ongoing (interventions implemented as appropriate)     Problem: Fall Risk (Adult)  Goal: Identify Related Risk Factors and Signs and Symptoms  Outcome: Ongoing (interventions implemented as appropriate)     Problem: Pain, Acute (Adult)  Goal: Identify Related Risk Factors and Signs and Symptoms  Outcome: Ongoing (interventions implemented as appropriate)

## 2020-07-13 NOTE — NURSING NOTE
Pt reports that she passed gas few times early this am, abd remians distended but softer.  No c/o nausea or vomiting all shift.

## 2020-07-13 NOTE — PLAN OF CARE
Problem: Infection, Risk/Actual (Adult)  Goal: Identify Related Risk Factors and Signs and Symptoms  Outcome: Ongoing (interventions implemented as appropriate)  Goal: Infection Prevention/Resolution  Outcome: Ongoing (interventions implemented as appropriate)     Problem: Patient Care Overview  Goal: Plan of Care Review  Outcome: Ongoing (interventions implemented as appropriate)  Flowsheets  Taken 7/11/2020 1724 by Shay Elizalde RN  Progress: no change  Taken 7/13/2020 1304 by Milad Raymond, RN  Plan of Care Reviewed With: patient  Taken 7/13/2020 1946 by Milad Raymond, RN  Outcome Summary: Patient alert and oriented. Encouraged patient to ambulate. Ambulated around the nursing unit. Patient denies nausea. No s/s of acute distress. Denies pain. F/C removed per MD order. I/C done output:500cc. Will continue to monitor.  Goal: Individualization and Mutuality  Outcome: Ongoing (interventions implemented as appropriate)  Goal: Discharge Needs Assessment  Outcome: Ongoing (interventions implemented as appropriate)  Goal: Interprofessional Rounds/Family Conf  Outcome: Ongoing (interventions implemented as appropriate)     Problem: Fall Risk (Adult)  Goal: Identify Related Risk Factors and Signs and Symptoms  Outcome: Ongoing (interventions implemented as appropriate)  Goal: Absence of Fall  Outcome: Ongoing (interventions implemented as appropriate)     Problem: Pain, Acute (Adult)  Goal: Identify Related Risk Factors and Signs and Symptoms  Outcome: Ongoing (interventions implemented as appropriate)  Goal: Acceptable Pain Control/Comfort Level  Outcome: Ongoing (interventions implemented as appropriate)

## 2020-07-14 ENCOUNTER — APPOINTMENT (OUTPATIENT)
Dept: CT IMAGING | Facility: HOSPITAL | Age: 80
End: 2020-07-14

## 2020-07-14 LAB
ANION GAP SERPL CALCULATED.3IONS-SCNC: 11.2 MMOL/L (ref 5–15)
BASOPHILS # BLD AUTO: 0.07 10*3/MM3 (ref 0–0.2)
BASOPHILS NFR BLD AUTO: 0.5 % (ref 0–1.5)
BUN SERPL-MCNC: 13 MG/DL (ref 8–23)
BUN/CREAT SERPL: 13.5 (ref 7–25)
CALCIUM SPEC-SCNC: 9.5 MG/DL (ref 8.6–10.5)
CHLORIDE SERPL-SCNC: 94 MMOL/L (ref 98–107)
CO2 SERPL-SCNC: 27.8 MMOL/L (ref 22–29)
CREAT SERPL-MCNC: 0.96 MG/DL (ref 0.57–1)
DEPRECATED RDW RBC AUTO: 41.4 FL (ref 37–54)
EOSINOPHIL # BLD AUTO: 0.16 10*3/MM3 (ref 0–0.4)
EOSINOPHIL NFR BLD AUTO: 1.1 % (ref 0.3–6.2)
ERYTHROCYTE [DISTWIDTH] IN BLOOD BY AUTOMATED COUNT: 12.3 % (ref 12.3–15.4)
GFR SERPL CREATININE-BSD FRML MDRD: 56 ML/MIN/1.73
GLUCOSE BLDC GLUCOMTR-MCNC: 142 MG/DL (ref 70–130)
GLUCOSE BLDC GLUCOMTR-MCNC: 150 MG/DL (ref 70–130)
GLUCOSE BLDC GLUCOMTR-MCNC: 199 MG/DL (ref 70–130)
GLUCOSE SERPL-MCNC: 149 MG/DL (ref 65–99)
HCT VFR BLD AUTO: 36.2 % (ref 34–46.6)
HGB BLD-MCNC: 12.3 G/DL (ref 12–15.9)
IMM GRANULOCYTES # BLD AUTO: 0.33 10*3/MM3 (ref 0–0.05)
IMM GRANULOCYTES NFR BLD AUTO: 2.3 % (ref 0–0.5)
LYMPHOCYTES # BLD AUTO: 1.44 10*3/MM3 (ref 0.7–3.1)
LYMPHOCYTES NFR BLD AUTO: 9.8 % (ref 19.6–45.3)
MCH RBC QN AUTO: 31.1 PG (ref 26.6–33)
MCHC RBC AUTO-ENTMCNC: 34 G/DL (ref 31.5–35.7)
MCV RBC AUTO: 91.4 FL (ref 79–97)
MONOCYTES # BLD AUTO: 1.03 10*3/MM3 (ref 0.1–0.9)
MONOCYTES NFR BLD AUTO: 7 % (ref 5–12)
NEUTROPHILS NFR BLD AUTO: 11.61 10*3/MM3 (ref 1.7–7)
NEUTROPHILS NFR BLD AUTO: 79.3 % (ref 42.7–76)
NRBC BLD AUTO-RTO: 0 /100 WBC (ref 0–0.2)
PLATELET # BLD AUTO: 306 10*3/MM3 (ref 140–450)
PMV BLD AUTO: 9 FL (ref 6–12)
POTASSIUM SERPL-SCNC: 4 MMOL/L (ref 3.5–5.2)
RBC # BLD AUTO: 3.96 10*6/MM3 (ref 3.77–5.28)
SODIUM SERPL-SCNC: 133 MMOL/L (ref 136–145)
WBC # BLD AUTO: 14.64 10*3/MM3 (ref 3.4–10.8)

## 2020-07-14 PROCEDURE — 63710000001 INSULIN REGULAR HUMAN PER 5 UNITS: Performed by: SURGERY

## 2020-07-14 PROCEDURE — 82962 GLUCOSE BLOOD TEST: CPT

## 2020-07-14 PROCEDURE — 99024 POSTOP FOLLOW-UP VISIT: CPT | Performed by: SURGERY

## 2020-07-14 PROCEDURE — 74177 CT ABD & PELVIS W/CONTRAST: CPT

## 2020-07-14 PROCEDURE — 25010000002 IOPAMIDOL 61 % SOLUTION: Performed by: SURGERY

## 2020-07-14 PROCEDURE — 80048 BASIC METABOLIC PNL TOTAL CA: CPT | Performed by: SURGERY

## 2020-07-14 PROCEDURE — 85025 COMPLETE CBC W/AUTO DIFF WBC: CPT | Performed by: SURGERY

## 2020-07-14 RX ADMIN — POTASSIUM CHLORIDE 40 MEQ: 1.5 POWDER, FOR SOLUTION ORAL at 07:58

## 2020-07-14 RX ADMIN — INSULIN HUMAN 2 UNITS: 100 INJECTION, SOLUTION PARENTERAL at 12:26

## 2020-07-14 RX ADMIN — METOPROLOL SUCCINATE 50 MG: 50 TABLET, EXTENDED RELEASE ORAL at 08:00

## 2020-07-14 RX ADMIN — POTASSIUM CHLORIDE 40 MEQ: 1.5 POWDER, FOR SOLUTION ORAL at 21:44

## 2020-07-14 RX ADMIN — SODIUM CHLORIDE, PRESERVATIVE FREE 10 ML: 5 INJECTION INTRAVENOUS at 21:44

## 2020-07-14 RX ADMIN — MAGNESIUM HYDROXIDE 10 ML: 2400 SUSPENSION ORAL at 08:00

## 2020-07-14 RX ADMIN — BISACODYL 10 MG: 10 SUPPOSITORY RECTAL at 07:59

## 2020-07-14 RX ADMIN — DOCUSATE SODIUM 100 MG: 100 CAPSULE, LIQUID FILLED ORAL at 21:44

## 2020-07-14 RX ADMIN — ISOSORBIDE MONONITRATE 30 MG: 30 TABLET ORAL at 21:44

## 2020-07-14 RX ADMIN — AMLODIPINE BESYLATE: 2.5 TABLET ORAL at 08:00

## 2020-07-14 RX ADMIN — DOCUSATE SODIUM 100 MG: 100 CAPSULE, LIQUID FILLED ORAL at 07:58

## 2020-07-14 RX ADMIN — IOPAMIDOL 100 ML: 612 INJECTION, SOLUTION INTRAVENOUS at 14:15

## 2020-07-14 RX ADMIN — INSULIN HUMAN 2 UNITS: 100 INJECTION, SOLUTION PARENTERAL at 01:19

## 2020-07-14 RX ADMIN — DULOXETINE HYDROCHLORIDE 30 MG: 30 CAPSULE, DELAYED RELEASE ORAL at 21:44

## 2020-07-14 RX ADMIN — CHLORTHALIDONE 25 MG: 25 TABLET ORAL at 08:00

## 2020-07-14 RX ADMIN — PANTOPRAZOLE SODIUM 40 MG: 40 TABLET, DELAYED RELEASE ORAL at 06:03

## 2020-07-14 RX ADMIN — INSULIN HUMAN 2 UNITS: 100 INJECTION, SOLUTION PARENTERAL at 18:36

## 2020-07-14 NOTE — PROGRESS NOTES
Postoperative day 6 status post laparoscopic appendectomy    S: No events overnight.  Has been unable to void and was straight cathed twice.  She has been tolerating diet.  Small bowel movement.  Minimal abdominal pain    O:   Vitals:    07/13/20 2332 07/14/20 0700 07/14/20 1227 07/14/20 1300   BP: 135/71 137/85  150/85   BP Location: Left arm Left arm  Left arm   Patient Position: Lying Lying  Lying   Pulse: 97 90 98 106   Resp: 16 18  18   Temp: 97.5 °F (36.4 °C) 98.5 °F (36.9 °C)  97.7 °F (36.5 °C)   TempSrc: Temporal Oral  Temporal   SpO2: 94% 93% 93% 96%   Weight:       Height:         Alert oriented x3, no acute distress  Abdomen soft, mildly tender, mildly tender, no rebound or guarding no peritoneal signs, incision clean dry and intact    Sodium 133, potassium 4, CO2 27.8  Creatinine 0.9  White blood cell count 14 from 12  Hemoglobin stable    CT scan abdomen pelvis showed no evidence of intra-abdominal abscess.  Small amount of air at the umbilical incision    Postoperative day 6 status post laparoscopic appendectomy with postoperative ileus that seems to be resolving.  She has been tolerating regular diet without any nausea or vomiting.  Not having much bowel function yet.  She has developed urinary retention and has been seen by urology that is following.  CT scan was reviewed by me.  There is no evidence of intra-abdominal abscess     -Continue regular diet  -Continue bowel regimen  -Daly catheter to be placed if residuals greater than 300 cc or she has suprapubic pain  -We will continue to follow

## 2020-07-14 NOTE — NURSING NOTE
Pt taken to toilet x4 this shift  to promote bowel and bladder emptying, sat 15mins at a time, no success, pt reports she feels it but nothing coming out, had a very small stool(pebble). Amb in hallway 240ft this am. Bladder scanned again 688 this time, will perform in and out cath. Pt denies discomfort or pressure. Abd distended, is passing gas this am.

## 2020-07-14 NOTE — PLAN OF CARE
Problem: Patient Care Overview  Goal: Plan of Care Review  Outcome: Ongoing (interventions implemented as appropriate)     Problem: Fall Risk (Adult)  Goal: Identify Related Risk Factors and Signs and Symptoms  Outcome: Ongoing (interventions implemented as appropriate)     Problem: Pain, Acute (Adult)  Goal: Identify Related Risk Factors and Signs and Symptoms  Outcome: Ongoing (interventions implemented as appropriate)

## 2020-07-14 NOTE — PLAN OF CARE
1712 Spoke with Dr. Ash Paniagua and made him aware that patient voided and PVR was 317. Verbalized to him that patient has been having several loose stools. He verbalized that another voiding trail could be done before davis catheter is placed.     Problem: Infection, Risk/Actual (Adult)  Goal: Identify Related Risk Factors and Signs and Symptoms  Outcome: Ongoing (interventions implemented as appropriate)  Goal: Infection Prevention/Resolution  Outcome: Ongoing (interventions implemented as appropriate)  Flowsheets (Taken 7/14/2020 1648)  Infection Prevention/Resolution: making progress toward outcome     Problem: Patient Care Overview  Goal: Plan of Care Review  Outcome: Ongoing (interventions implemented as appropriate)  Flowsheets  Taken 7/11/2020 1724 by Shay Elizalde, RN  Progress: no change  Taken 7/14/2020 1226 by Milad Raymond, RN  Plan of Care Reviewed With: patient  Taken 7/14/2020 1648 by Milad Raymond, RN  Outcome Summary: Patient alert and oriented. Patient denies pain. Patient has had several bm's this shift. Patient voided several times. PVR being monitored per MD order. No s/s of acute distress. Will continue to monitor.  Goal: Individualization and Mutuality  Outcome: Ongoing (interventions implemented as appropriate)  Goal: Discharge Needs Assessment  Outcome: Ongoing (interventions implemented as appropriate)  Goal: Interprofessional Rounds/Family Conf  Outcome: Ongoing (interventions implemented as appropriate)     Problem: Fall Risk (Adult)  Goal: Identify Related Risk Factors and Signs and Symptoms  Outcome: Ongoing (interventions implemented as appropriate)  Flowsheets (Taken 7/10/2020 1646)  Signs and Symptoms (Fall Risk): presence of risk factors  Goal: Absence of Fall  Outcome: Ongoing (interventions implemented as appropriate)  Flowsheets (Taken 7/14/2020 1648)  Absence of Fall: making progress toward outcome     Problem: Pain, Acute (Adult)  Goal:  Identify Related Risk Factors and Signs and Symptoms  Outcome: Ongoing (interventions implemented as appropriate)  Goal: Acceptable Pain Control/Comfort Level  Outcome: Ongoing (interventions implemented as appropriate)  Flowsheets (Taken 7/14/2020 2777)  Acceptable Pain Control/Comfort Level: making progress toward outcome

## 2020-07-14 NOTE — CONSULTS
[facility] CONSULT      Patient Identification:  NAME:  Navi Kinney  Age:  79 y.o.   Sex:  female   :  1940   MRN:  [id number]       Chief complaint: Urinary Retention    History of present illness:  Navi Kinney is a 79 year old woman s/p laparoscopic Appendectomy on 2020. She has ileus, slowly resolving, hyponatremia.  Post-operatively, her DONI resolved, Cr. 0.96, however she continues to have urinary retention. Bladder scanned 688 mL this AM, will perform in and out cath. Pt denies discomfort or pressure. Abd distended, is passing gas this am. Also has rising wbc, this AM, 14.64. Primary team obtaining new Abd CT.    She was previously followed by my partner Dr. Sadiq Flores, last seen in .    CT 2020: CT findings consistent with acute appendicitis as described.  There is no abscess or free air. Moderate hepatic steatosis. Moderately  extensive colonic diverticulosis without evidence of diverticulitis.       Past medical history:  Past Medical History:   Diagnosis Date   • Anxiety    • Arthritis    • Carotid artery stenosis     mild stenosis bilaterally per US   • CKD (chronic kidney disease)     Stage III   • Coronary artery disease    • Depression    • Hyperlipidemia    • Hypertension    • Neuromuscular disorder (CMS/HCC)    • Sinus bradycardia    • Sleep apnea     does not uses CPAP or BiPAP   • Slow to wake up after anesthesia    • Ventral hernia        Past surgical history:  Past Surgical History:   Procedure Laterality Date   • APPENDECTOMY N/A 2020    Procedure: APPENDECTOMY LAPAROSCOPIC;  Surgeon: Guzman Beal MD;  Location: [facility];  Service: General;  Laterality: N/A;   • CARDIAC CATHETERIZATION N/A     Dr. Araiza; total RCA, occ SVG to RCA, subtotal small mild Cx, patent LIMA to LAD, MYA to OM; EF normal   • CARDIAC SURGERY     • CATARACT EXTRACTION W/ INTRAOCULAR LENS IMPLANT Bilateral     Dr. Carlos   • CHOLECYSTECTOMY N/A 2008     Dr. Karlie Rabago   • COLONOSCOPY N/A 12/01/2013   • CORONARY ARTERY BYPASS GRAFT N/A 11/11/1997    Quadruple CABG-Dr. Henson   • TONSILLECTOMY Bilateral    • TOTAL HIP ARTHROPLASTY Right 04/29/2015    Right anterior approach total hip arthroplasty-Dr. Albert Espino   • TOTAL HIP ARTHROPLASTY Left 12/22/2014    Left anterior approach total hip arthroplasty-Dr. Albert Espino   • VENTRAL/INCISIONAL HERNIA REPAIR N/A 1/26/2018    Procedure: open recurrent INCISIONAL HERNIA REPAIR with mesh;  Surgeon: Nereida Almodovar MD;  Location: [facility];  Service:        Allergies:  Patient has no known allergies.    Home medications:  Medications Prior to Admission   Medication Sig Dispense Refill Last Dose   • amLODIPine-benazepril (LOTREL 2.5-10) 2.5-10 MG per capsule Take 1 capsule by mouth Every Night. 90 capsule 1 7/9/2020 at Unknown time   • chlorthalidone (HYGROTON) 25 MG tablet TAKE ONE TABLET BY MOUTH DAILY 90 tablet 2 7/9/2020 at Unknown time   • DULoxetine (CYMBALTA) 30 MG capsule Take 1 capsule by mouth Every Night. 90 capsule 2 7/8/2020 at Unknown time   • isosorbide mononitrate (IMDUR) 30 MG 24 hr tablet Take 1 tablet by mouth Every Night. (Patient taking differently: Take 60 mg by mouth Every Night.) 90 tablet 2 Patient Taking Differently at Unknown time   • metFORMIN ER (GLUCOPHAGE-XR) 500 MG 24 hr tablet TAKE ONE TABLET BY MOUTH DAILY WITH BREAKFAST 30 tablet 2 7/8/2020 at Unknown time   • metoprolol succinate XL (TOPROL-XL) 50 MG 24 hr tablet TAKE ONE TABLET BY MOUTH NIGHTLY 90 tablet 0 7/9/2020 at 2300   • colestipol (COLESTID) 1 g tablet Take 2 g by mouth.   Unknown at Unknown time   • Cyanocobalamin (VITAMIN B-12 PO) Take 500 mcg by mouth Every Night.   Unknown at Unknown time   • loperamide (IMODIUM A-D) 2 MG tablet Take 2 mg by mouth.   Taking   • nitroglycerin (NITROSTAT) 0.4 MG SL tablet Place 1 tablet under the tongue Every 5 (Five) Minutes As Needed for Chest Pain. 90 tablet 3 Unknown at Unknown time             Hospital medications:    amLODIPine-lisinopril 2.5-10 mg combo dose  Oral Q24H   bisacodyl 10 mg Rectal Daily   chlorthalidone 25 mg Oral Daily   docusate sodium 100 mg Oral BID   DULoxetine 30 mg Oral Nightly   insulin regular 0-7 Units Subcutaneous Q6H   isosorbide mononitrate 30 mg Oral Nightly   magnesium hydroxide 10 mL Oral Daily   metoprolol succinate XL 50 mg Oral Q24H   pantoprazole 40 mg Oral Q AM   potassium chloride 40 mEq Oral BID   sodium chloride 10 mL Intravenous Q12H        calcium carbonate  •  dextrose  •  dextrose  •  glucagon (human recombinant)  •  HYDROmorphone **AND** naloxone  •  nitroglycerin  •  oxyCODONE-acetaminophen  •  promethazine **OR** promethazine **OR** promethazine **OR** promethazine  •  sodium chloride    Family history:  Family History   Problem Relation Age of Onset   • Heart disease Mother    • Hypertension Mother    • Malig Hyperthermia Neg Hx        Social history:  Social History     Tobacco Use   • Smoking status: Former Smoker     Packs/day: 1.00     Years: 25.00     Pack years: 25.00     Types: Cigarettes     Last attempt to quit:      Years since quittin.5   • Smokeless tobacco: Never Used   Substance Use Topics   • Alcohol use: No   • Drug use: No       REVIEW OF SYSTEMS:  Constitutional - Negative for fevers/chills  Eyes/Ears/Nose/Mouth/Throat - Negative for changes in vision  Cardiovascular - Negative for chest pain, dysrhythmia  Respiratory - Negative for dyspnea  Gastrointestinal - Negative for nausea or vomiting  Genitourinary - See HPI  Hematologic/Lymphatic - Negative for bruising  Skin - Negative for erythema  Endocrine - Negative for history of diabetes    Objective:  TMax 24 hours:   Temp (24hrs), Av °F (36.7 °C), Min:97.5 °F (36.4 °C), Max:98.5 °F (36.9 °C)      Vitals Ranges:   Temp:  [97.5 °F (36.4 °C)-98.5 °F (36.9 °C)] 98.5 °F (36.9 °C)  Heart Rate:  [89-97] 90  Resp:  [16-18] 18  BP: (135-145)/(71-89) 137/85    Intake/Output Last 3  shifts:  I/O last 3 completed shifts:  In: 1000 [I.V.:1000]  Out: 1050 [Urine:1050]     Physical Exam:    General Appearance:    Alert, cooperative, NAD   HEENT:    No trauma, pupils reactive, hearing intact   Back:     No CVA tenderness   Lungs:     Respirations unlabored, no wheezing    Heart:    RRR, intact peripheral pulses   Abdomen:     Soft, NDNT, no masses, no guarding   :    Sitting up in bed, having bowel movement, not examined   Extremities:   No edema, no deformity   Lymphatic:   No neck or groin LAD   Skin:   No bleeding, bruising or rashes   Neuro/Psych:   Orientation intact, mood/affect pleasant, no focal findings       Results review:   I reviewed the patient's new clinical results.    Data review:  Lab Results (last 24 hours)     Procedure Component Value Units Date/Time    Basic Metabolic Panel [[id number]]  (Abnormal) Collected:  07/14/20 0810    Specimen:  Blood from Arm, Left Updated:  07/14/20 0853     Glucose 149 mg/dL      BUN 13 mg/dL      Creatinine 0.96 mg/dL      Sodium 133 mmol/L      Potassium 4.0 mmol/L      Chloride 94 mmol/L      CO2 27.8 mmol/L      Calcium 9.5 mg/dL      eGFR Non African Amer 56 mL/min/1.73      BUN/Creatinine Ratio 13.5     Anion Gap 11.2 mmol/L     Narrative:       GFR Normal >60  Chronic Kidney Disease <60  Kidney Failure <15      CBC & Differential [[id number]] Collected:  07/14/20 0810    Specimen:  Blood Updated:  07/14/20 0828    Narrative:       The following orders were created for panel order CBC & Differential.  Procedure                               Abnormality         Status                     ---------                               -----------         ------                     CBC Auto Differential[[id number]]        Abnormal            Final result                 Please view results for these tests on the individual orders.    CBC Auto Differential [[id number]]  (Abnormal) Collected:  07/14/20 0810    Specimen:  Blood Updated:  07/14/20 0828      WBC 14.64 10*3/mm3      RBC 3.96 10*6/mm3      Hemoglobin 12.3 g/dL      Hematocrit 36.2 %      MCV 91.4 fL      MCH 31.1 pg      MCHC 34.0 g/dL      RDW 12.3 %      RDW-SD 41.4 fl      MPV 9.0 fL      Platelets 306 10*3/mm3      Neutrophil % 79.3 %      Lymphocyte % 9.8 %      Monocyte % 7.0 %      Eosinophil % 1.1 %      Basophil % 0.5 %      Immature Grans % 2.3 %      Neutrophils, Absolute 11.61 10*3/mm3      Lymphocytes, Absolute 1.44 10*3/mm3      Monocytes, Absolute 1.03 10*3/mm3      Eosinophils, Absolute 0.16 10*3/mm3      Basophils, Absolute 0.07 10*3/mm3      Immature Grans, Absolute 0.33 10*3/mm3      nRBC 0.0 /100 WBC     POC Glucose Once [[id number]]  (Abnormal) Collected:  07/14/20 0605    Specimen:  Blood Updated:  07/14/20 0616     Glucose 142 mg/dL     POC Glucose Once [[id number]]  (Abnormal) Collected:  07/13/20 2004    Specimen:  Blood Updated:  07/13/20 2006     Glucose 173 mg/dL     POC Glucose Once [[id number]]  (Normal) Collected:  07/13/20 1625    Specimen:  Blood Updated:  07/13/20 1627     Glucose 129 mg/dL     Basic Metabolic Panel [[id number]]  (Abnormal) Collected:  07/13/20 1238    Specimen:  Blood from Arm, Right Updated:  07/13/20 1312     Glucose 139 mg/dL      BUN 13 mg/dL      Creatinine 0.97 mg/dL      Sodium 135 mmol/L      Potassium 3.8 mmol/L      Chloride 96 mmol/L      CO2 26.7 mmol/L      Calcium 9.5 mg/dL      eGFR Non African Amer 56 mL/min/1.73      BUN/Creatinine Ratio 13.4     Anion Gap 12.3 mmol/L     Narrative:       GFR Normal >60  Chronic Kidney Disease <60  Kidney Failure <15      CBC & Differential [[id number]] Collected:  07/13/20 1238    Specimen:  Blood Updated:  07/13/20 1248    Narrative:       The following orders were created for panel order CBC & Differential.  Procedure                               Abnormality         Status                     ---------                               -----------         ------                     CBC Auto  Differential[[id number]]        Abnormal            Final result                 Please view results for these tests on the individual orders.    CBC Auto Differential [[id number]]  (Abnormal) Collected:  07/13/20 1238    Specimen:  Blood Updated:  07/13/20 1248     WBC 11.38 10*3/mm3      RBC 3.94 10*6/mm3      Hemoglobin 12.3 g/dL      Hematocrit 36.1 %      MCV 91.6 fL      MCH 31.2 pg      MCHC 34.1 g/dL      RDW 12.6 %      RDW-SD 41.2 fl      MPV 9.4 fL      Platelets 328 10*3/mm3      Neutrophil % 73.3 %      Lymphocyte % 14.1 %      Monocyte % 7.8 %      Eosinophil % 2.3 %      Basophil % 0.6 %      Immature Grans % 1.9 %      Neutrophils, Absolute 8.34 10*3/mm3      Lymphocytes, Absolute 1.60 10*3/mm3      Monocytes, Absolute 0.89 10*3/mm3      Eosinophils, Absolute 0.26 10*3/mm3      Basophils, Absolute 0.07 10*3/mm3      Immature Grans, Absolute 0.22 10*3/mm3      nRBC 0.0 /100 WBC     POC Glucose Once [[id number]]  (Abnormal) Collected:  07/13/20 1104    Specimen:  Blood Updated:  07/13/20 1108     Glucose 139 mg/dL            Imaging:  Imaging Results (Last 24 Hours)     ** No results found for the last 24 hours. **           Assessment:       Acute appendicitis with localized peritonitis, without perforation, abscess, or gangrene    Postoperative ileus (CMS/HCC)    Urinary Retention, post-op - seems to be improving slowly, initial PVR >600 mL, now doing better with ~200-300 mL  Ileus    New CT 7/14/2020 shows moderately distended bladder as expected with ongoing urinary retention.    Plan:     - Continue to bladder scan, if > 300 mL or in pain, recommend insertion of indwelling Daly catheter  - Spoke with patient about recommendation  - Nurse will continue voiding trial  - Follow-up with Dr. Ash Paniagua ([facility]) 1-2 weeks after Discharge - Schedulers messaged.      Ash Paniagua MD  07/14/20  10:54

## 2020-07-15 ENCOUNTER — READMISSION MANAGEMENT (OUTPATIENT)
Dept: CALL CENTER | Facility: HOSPITAL | Age: 80
End: 2020-07-15

## 2020-07-15 VITALS
OXYGEN SATURATION: 92 % | TEMPERATURE: 98.3 F | WEIGHT: 175 LBS | DIASTOLIC BLOOD PRESSURE: 67 MMHG | SYSTOLIC BLOOD PRESSURE: 121 MMHG | HEART RATE: 96 BPM | BODY MASS INDEX: 29.16 KG/M2 | HEIGHT: 65 IN | RESPIRATION RATE: 16 BRPM

## 2020-07-15 PROBLEM — K56.7 POSTOPERATIVE ILEUS: Status: RESOLVED | Noted: 2020-07-10 | Resolved: 2020-07-15

## 2020-07-15 PROBLEM — K35.30 ACUTE APPENDICITIS WITH LOCALIZED PERITONITIS, WITHOUT PERFORATION, ABSCESS, OR GANGRENE: Status: RESOLVED | Noted: 2020-07-08 | Resolved: 2020-07-15

## 2020-07-15 PROBLEM — K91.89 POSTOPERATIVE ILEUS (HCC): Status: RESOLVED | Noted: 2020-07-10 | Resolved: 2020-07-15

## 2020-07-15 LAB
ANION GAP SERPL CALCULATED.3IONS-SCNC: 10.9 MMOL/L (ref 5–15)
BASOPHILS # BLD AUTO: 0.1 10*3/MM3 (ref 0–0.2)
BASOPHILS NFR BLD AUTO: 0.7 % (ref 0–1.5)
BUN SERPL-MCNC: 13 MG/DL (ref 8–23)
BUN/CREAT SERPL: 12.4 (ref 7–25)
CALCIUM SPEC-SCNC: 9 MG/DL (ref 8.6–10.5)
CHLORIDE SERPL-SCNC: 96 MMOL/L (ref 98–107)
CO2 SERPL-SCNC: 27.1 MMOL/L (ref 22–29)
CREAT SERPL-MCNC: 1.05 MG/DL (ref 0.57–1)
DEPRECATED RDW RBC AUTO: 41.4 FL (ref 37–54)
EOSINOPHIL # BLD AUTO: 0.18 10*3/MM3 (ref 0–0.4)
EOSINOPHIL NFR BLD AUTO: 1.3 % (ref 0.3–6.2)
ERYTHROCYTE [DISTWIDTH] IN BLOOD BY AUTOMATED COUNT: 12.3 % (ref 12.3–15.4)
GFR SERPL CREATININE-BSD FRML MDRD: 51 ML/MIN/1.73
GLUCOSE BLDC GLUCOMTR-MCNC: 150 MG/DL (ref 70–130)
GLUCOSE BLDC GLUCOMTR-MCNC: 195 MG/DL (ref 70–130)
GLUCOSE BLDC GLUCOMTR-MCNC: 210 MG/DL (ref 70–130)
GLUCOSE SERPL-MCNC: 152 MG/DL (ref 65–99)
HCT VFR BLD AUTO: 33.9 % (ref 34–46.6)
HGB BLD-MCNC: 11.7 G/DL (ref 12–15.9)
IMM GRANULOCYTES # BLD AUTO: 0.39 10*3/MM3 (ref 0–0.05)
IMM GRANULOCYTES NFR BLD AUTO: 2.8 % (ref 0–0.5)
LYMPHOCYTES # BLD AUTO: 1.81 10*3/MM3 (ref 0.7–3.1)
LYMPHOCYTES NFR BLD AUTO: 12.8 % (ref 19.6–45.3)
MCH RBC QN AUTO: 31.6 PG (ref 26.6–33)
MCHC RBC AUTO-ENTMCNC: 34.5 G/DL (ref 31.5–35.7)
MCV RBC AUTO: 91.6 FL (ref 79–97)
MONOCYTES # BLD AUTO: 1.04 10*3/MM3 (ref 0.1–0.9)
MONOCYTES NFR BLD AUTO: 7.3 % (ref 5–12)
NEUTROPHILS NFR BLD AUTO: 10.63 10*3/MM3 (ref 1.7–7)
NEUTROPHILS NFR BLD AUTO: 75.1 % (ref 42.7–76)
NRBC BLD AUTO-RTO: 0 /100 WBC (ref 0–0.2)
PLATELET # BLD AUTO: 326 10*3/MM3 (ref 140–450)
PMV BLD AUTO: 9.5 FL (ref 6–12)
POTASSIUM SERPL-SCNC: 3.6 MMOL/L (ref 3.5–5.2)
RBC # BLD AUTO: 3.7 10*6/MM3 (ref 3.77–5.28)
SODIUM SERPL-SCNC: 134 MMOL/L (ref 136–145)
WBC # BLD AUTO: 14.15 10*3/MM3 (ref 3.4–10.8)

## 2020-07-15 PROCEDURE — 80048 BASIC METABOLIC PNL TOTAL CA: CPT | Performed by: SURGERY

## 2020-07-15 PROCEDURE — 85025 COMPLETE CBC W/AUTO DIFF WBC: CPT | Performed by: SURGERY

## 2020-07-15 PROCEDURE — 63710000001 INSULIN REGULAR HUMAN PER 5 UNITS: Performed by: SURGERY

## 2020-07-15 PROCEDURE — 82962 GLUCOSE BLOOD TEST: CPT

## 2020-07-15 RX ORDER — AMOXICILLIN AND CLAVULANATE POTASSIUM 875; 125 MG/1; MG/1
1 TABLET, FILM COATED ORAL EVERY 12 HOURS
Qty: 10 TABLET | Refills: 0 | Status: SHIPPED | OUTPATIENT
Start: 2020-07-15 | End: 2020-07-19 | Stop reason: ALTCHOICE

## 2020-07-15 RX ADMIN — INSULIN HUMAN 2 UNITS: 100 INJECTION, SOLUTION PARENTERAL at 06:59

## 2020-07-15 RX ADMIN — SODIUM CHLORIDE, PRESERVATIVE FREE 10 ML: 5 INJECTION INTRAVENOUS at 09:37

## 2020-07-15 RX ADMIN — METOPROLOL SUCCINATE 50 MG: 50 TABLET, EXTENDED RELEASE ORAL at 09:36

## 2020-07-15 RX ADMIN — INSULIN HUMAN 3 UNITS: 100 INJECTION, SOLUTION PARENTERAL at 12:15

## 2020-07-15 RX ADMIN — POTASSIUM CHLORIDE 40 MEQ: 1.5 POWDER, FOR SOLUTION ORAL at 09:36

## 2020-07-15 RX ADMIN — DOCUSATE SODIUM 100 MG: 100 CAPSULE, LIQUID FILLED ORAL at 09:36

## 2020-07-15 RX ADMIN — AMLODIPINE BESYLATE: 2.5 TABLET ORAL at 09:36

## 2020-07-15 RX ADMIN — PANTOPRAZOLE SODIUM 40 MG: 40 TABLET, DELAYED RELEASE ORAL at 05:34

## 2020-07-15 RX ADMIN — CHLORTHALIDONE 25 MG: 25 TABLET ORAL at 09:36

## 2020-07-15 NOTE — PLAN OF CARE
Patient  being  monitored for  urinal  retention  post  lap Appy.  Alert  and  oriented.   Still  having  trouble  voiding.  Voided  200  at  6am,  bladder  scan  after   was  238.  Ambulated  around  the  unit.   SR  on monitor.  Nursing  will  continue  to  monitor.  Problem: Infection, Risk/Actual (Adult)  Goal: Infection Prevention/Resolution  Outcome: Ongoing (interventions implemented as appropriate)  Flowsheets (Taken 7/15/2020 0650)  Infection Prevention/Resolution: making progress toward outcome     Problem: Patient Care Overview  Goal: Plan of Care Review  Outcome: Ongoing (interventions implemented as appropriate)  Flowsheets (Taken 7/15/2020 0650)  Progress: improving  Plan of Care Reviewed With: patient  Goal: Interprofessional Rounds/Family Conf  Outcome: Ongoing (interventions implemented as appropriate)  Flowsheets (Taken 7/15/2020 0650)  Participants: nursing; patient     Problem: Fall Risk (Adult)  Goal: Absence of Fall  Outcome: Ongoing (interventions implemented as appropriate)  Flowsheets (Taken 7/15/2020 0650)  Absence of Fall: making progress toward outcome     Problem: Pain, Acute (Adult)  Goal: Acceptable Pain Control/Comfort Level  Outcome: Ongoing (interventions implemented as appropriate)  Flowsheets (Taken 7/15/2020 0650)  Acceptable Pain Control/Comfort Level: making progress toward outcome

## 2020-07-15 NOTE — DISCHARGE INSTR - LAB
Ash Paniagua MD [facility], [address] [place] [address]  P: [phone]    Can call  if having trouble urinating.

## 2020-07-16 ENCOUNTER — TRANSITIONAL CARE MANAGEMENT TELEPHONE ENCOUNTER (OUTPATIENT)
Dept: CALL CENTER | Facility: HOSPITAL | Age: 80
End: 2020-07-16

## 2020-07-16 NOTE — OUTREACH NOTE
Prep Survey      Responses   [facility] facility patient discharged from?  [place]   Is LACE score < 7 ?  No   Eligibility  [facility]   Date of Admission  07/08/20   Date of Discharge  07/15/20   Discharge Disposition  Home or Self Care   Discharge diagnosis  Acute appendicitis, s/p appendectomy lap   COVID-19 Test Status  Negative   Does the patient have one of the following disease processes/diagnoses(primary or secondary)?  General Surgery   Does the patient have Home health ordered?  No   Is there a DME ordered?  No   Prep survey completed?  Yes          Mariana Vasques, RN

## 2020-07-16 NOTE — OUTREACH NOTE
Call Center TCM Note      Responses   [facility] patient discharged from?  [place]   Does the patient have one of the following disease processes/diagnoses(primary or secondary)?  General Surgery   TCM attempt successful?  Yes   Call start time  1558   Call end time  1605   Discharge diagnosis  Acute appendicitis, s/p appendectomy lap   Is patient permission given to speak with other caregiver?  No   Meds reviewed with patient/caregiver?  Yes   Is the patient having any side effects they believe may be caused by any medication additions or changes?  No   Does the patient have all medications related to this admission filled (includes all antibiotics, pain medications, etc.)  Yes   Is the patient taking all medications as directed (includes completed medication regime)?  Yes   Does the patient have a follow up appointment scheduled with their surgeon?  No   What is preventing the patient from scheduling follow up appointments?  Haven't had time   Nursing Interventions  Educated patient on importance of making appointment, Advised patient to make appointment   Has the patient kept scheduled appointments due by today?  N/A   Comments  Hospital follow up scheduled for patient for 7/22/20 1pm with Lulu SARABIA   Has home health visited the patient within 72 hours of discharge?  N/A   Psychosocial issues?  No   Did the patient receive a copy of their discharge instructions?  Yes   Nursing interventions  Reviewed instructions with patient   What is the patient's perception of their health status since discharge?  Improving   Nursing interventions  Nurse provided patient education   Is the patient /caregiver able to teach back basic post-op care?  No tub bath, swimming, or hot tub until instructed by MD, Keep incision areas clean,dry and protected, Lifting as instructed by MD in discharge instructions   Is the patient/caregiver able to teach back signs and symptoms of incisional infection?  Increased redness,  swelling or pain at the incisonal site, Increased drainage or bleeding, Incisional warmth, Pus or odor from incision, Fever   Is the patient/caregiver able to teach back steps to recovery at home?  Set small, achievable goals for return to baseline health, Rest and rebuild strength, gradually increase activity   Is the patient/caregiver able to teach back the hierarchy of who to call/visit for symptoms/problems? PCP, Specialist, Home health nurse, Urgent Care, ED, 911  Yes   TCM call completed?  Yes          Nelsy Gray RN    7/16/2020, 16:05

## 2020-07-19 ENCOUNTER — DOCUMENTATION (OUTPATIENT)
Dept: SURGERY | Facility: CLINIC | Age: 80
End: 2020-07-19

## 2020-07-19 RX ORDER — METRONIDAZOLE 500 MG/1
500 TABLET ORAL 3 TIMES DAILY
Qty: 30 TABLET | Refills: 0 | Status: SHIPPED | OUTPATIENT
Start: 2020-07-19 | End: 2020-07-29

## 2020-07-19 RX ORDER — CEPHALEXIN 500 MG/1
500 CAPSULE ORAL 3 TIMES DAILY
Qty: 30 CAPSULE | Refills: 0 | Status: SHIPPED | OUTPATIENT
Start: 2020-07-19 | End: 2020-07-29

## 2020-07-22 ENCOUNTER — OFFICE VISIT (OUTPATIENT)
Dept: INTERNAL MEDICINE | Facility: CLINIC | Age: 80
End: 2020-07-22

## 2020-07-22 ENCOUNTER — APPOINTMENT (OUTPATIENT)
Dept: WOMENS IMAGING | Facility: HOSPITAL | Age: 80
End: 2020-07-22

## 2020-07-22 VITALS
OXYGEN SATURATION: 98 % | HEART RATE: 86 BPM | SYSTOLIC BLOOD PRESSURE: 102 MMHG | HEIGHT: 64 IN | BODY MASS INDEX: 27.86 KG/M2 | RESPIRATION RATE: 16 BRPM | TEMPERATURE: 97.4 F | DIASTOLIC BLOOD PRESSURE: 70 MMHG | WEIGHT: 163.2 LBS

## 2020-07-22 DIAGNOSIS — Z09 HOSPITAL DISCHARGE FOLLOW-UP: Primary | ICD-10-CM

## 2020-07-22 DIAGNOSIS — R33.9 URINARY RETENTION: ICD-10-CM

## 2020-07-22 DIAGNOSIS — T81.49XA SURGICAL SITE INFECTION: ICD-10-CM

## 2020-07-22 DIAGNOSIS — R53.83 FATIGUE, UNSPECIFIED TYPE: ICD-10-CM

## 2020-07-22 DIAGNOSIS — E11.9 NON-INSULIN DEPENDENT TYPE 2 DIABETES MELLITUS (HCC): ICD-10-CM

## 2020-07-22 DIAGNOSIS — Z90.49 STATUS POST APPENDECTOMY: ICD-10-CM

## 2020-07-22 PROBLEM — Z95.1 S/P CABG (CORONARY ARTERY BYPASS GRAFT): Status: ACTIVE | Noted: 2020-01-21

## 2020-07-22 PROBLEM — G47.33 OBSTRUCTIVE SLEEP APNEA: Status: ACTIVE | Noted: 2020-07-22

## 2020-07-22 PROBLEM — E66.9 OBESITY: Status: ACTIVE | Noted: 2020-07-22

## 2020-07-22 LAB
BUN SERPL-MCNC: 14 MG/DL (ref 8–23)
BUN/CREAT SERPL: 15.9 (ref 7–25)
CALCIUM SERPL-MCNC: 9.6 MG/DL (ref 8.6–10.5)
CHLORIDE SERPL-SCNC: 90 MMOL/L (ref 98–107)
CO2 SERPL-SCNC: 26 MMOL/L (ref 22–29)
CREAT SERPL-MCNC: 0.88 MG/DL (ref 0.57–1)
ERYTHROCYTE [DISTWIDTH] IN BLOOD BY AUTOMATED COUNT: 12.1 % (ref 12.3–15.4)
GLUCOSE SERPL-MCNC: 121 MG/DL (ref 65–99)
HBA1C MFR BLD: 7.4 % (ref 4.8–5.6)
HCT VFR BLD AUTO: 35.3 % (ref 34–46.6)
HGB BLD-MCNC: 12.2 G/DL (ref 12–15.9)
MCH RBC QN AUTO: 31 PG (ref 26.6–33)
MCHC RBC AUTO-ENTMCNC: 34.6 G/DL (ref 31.5–35.7)
MCV RBC AUTO: 89.6 FL (ref 79–97)
PLATELET # BLD AUTO: 435 10*3/MM3 (ref 140–450)
POTASSIUM SERPL-SCNC: 3.7 MMOL/L (ref 3.5–5.2)
RBC # BLD AUTO: 3.94 10*6/MM3 (ref 3.77–5.28)
SODIUM SERPL-SCNC: 130 MMOL/L (ref 136–145)
WBC # BLD AUTO: 14.17 10*3/MM3 (ref 3.4–10.8)

## 2020-07-22 PROCEDURE — 71046 X-RAY EXAM CHEST 2 VIEWS: CPT | Performed by: NURSE PRACTITIONER

## 2020-07-22 PROCEDURE — 71046 X-RAY EXAM CHEST 2 VIEWS: CPT | Performed by: RADIOLOGY

## 2020-07-22 PROCEDURE — 99496 TRANSJ CARE MGMT HIGH F2F 7D: CPT | Performed by: NURSE PRACTITIONER

## 2020-07-22 NOTE — PATIENT INSTRUCTIONS

## 2020-07-22 NOTE — PROGRESS NOTES
"Transitional Care Follow Up Visit  Subjective     Navi Kinney is a 79 y.o. female who presents for a transitional care management visit.    Within 48 business hours after discharge our office contacted her via telephone to coordinate her care and needs.      I reviewed and discussed the details of that call along with the discharge summary, hospital problems, inpatient lab results, inpatient diagnostic studies, and consultation reports with Navi.     Current outpatient and discharge medications have been reconciled for the patient.  Reviewed by: Quintin Hamilton MA      Date of TCM Phone Call 7/15/2020   [facility]   Date of Admission 7/8/2020   Date of Discharge 7/15/2020   Discharge Disposition Home or Self Care     Risk for Readmission (LACE) Score: 11 (7/15/2020  6:00 AM)      Patient presents for [facility] hospital follow-up.  This is a 79-year-old female.  Did to [facility] on 7/8/2020 with abdominal pain and underwent laparoscopic appendectomy.  Postoperatively she developed urinary retention and required a Daly catheter with urology consult.  Additionally she unfortunately developed a postoperative ileus.  Her diet was slowly advanced and this resolved.  Prior to discharge she developed cellulitis around the umbilicus incision and was placed on Augmentin.  She contacted her surgeon on 7/19/2020 and was prescribed Keflex for concerns of additional infection.  Her white blood cell count at discharge was 14.15.  She has been feeling very lethargic since discharge.  Endorses mild shortness of breath on exertion since discharge.  Denies chest pain or palpitations.  Denies cough or fevers.  She has a follow-up in 2 days with Dr. Beal, general surgery.  She is concerned because her abdomen around the umbilicus incision is still very red and feels \"hard to the touch.\"  She denies development of any other new issues today.     Course During Hospital Stay:  See HPI     The following portions of " "the patient's history were reviewed and updated as appropriate: allergies, current medications, past family history, past medical history, past social history, past surgical history and problem list.    Review of Systems   Constitutional: Positive for fatigue. Negative for activity change, chills, fever and unexpected weight change.   HENT: Negative for congestion, hearing loss, postnasal drip, sinus pressure, sinus pain, sneezing, sore throat and tinnitus.    Eyes: Negative for photophobia, pain and visual disturbance.   Respiratory: Positive for shortness of breath. Negative for cough, chest tightness and wheezing.    Cardiovascular: Negative for chest pain, palpitations and leg swelling.   Gastrointestinal: Negative for abdominal distention, abdominal pain, constipation, diarrhea, nausea and vomiting.   Endocrine: Negative for polydipsia, polyphagia and polyuria.   Genitourinary: Negative for dysuria, frequency, hematuria and urgency.   Skin: Positive for color change (  redness around umbillicus lap site  ).   Neurological: Negative for dizziness, weakness, numbness and headaches.   All other systems reviewed and are negative.      Objective    /70 (BP Location: Left arm, Patient Position: Sitting, Cuff Size: Adult)   Pulse 86   Temp 97.4 °F (36.3 °C) (Temporal)   Resp 16   Ht 162.6 cm (64\")   Wt 74 kg (163 lb 3.2 oz)   LMP  (LMP Unknown)   SpO2 98%   Breastfeeding No   BMI 28.01 kg/m²     Physical Exam   Constitutional: She is oriented to person, place, and time. She appears well-developed and well-nourished. No distress.   HENT:   Head: Normocephalic and atraumatic.   Eyes: Pupils are equal, round, and reactive to light. EOM are normal.   Neck: Normal range of motion. Neck supple. No JVD present. No tracheal deviation present. No thyromegaly present.   Cardiovascular: Normal rate, regular rhythm, normal heart sounds and intact distal pulses. Exam reveals no gallop and no friction rub.   No " murmur heard.  No peripheral edema.  Posterior tib pulses 2+ and equal bilaterally.   Pulmonary/Chest: Effort normal and breath sounds normal. No stridor. No respiratory distress. She has no wheezes. She has no rales. She exhibits no tenderness.   Lungs CTA bilaterally   Abdominal: Soft. Bowel sounds are normal. She exhibits no distension. There is no tenderness.   Bowel sounds active x4 quadrants.  Well approximated lap sites throughout abdomen.  Erythema and induration surrounding umbilicus lap site   Musculoskeletal: Normal range of motion.   Lymphadenopathy:     She has no cervical adenopathy.   Neurological: She is alert and oriented to person, place, and time.   Skin: Skin is warm and dry. Capillary refill takes less than 2 seconds. No rash noted. She is not diaphoretic. There is erythema. No pallor.   Erythema and induration surrounding umbilicus lap site.   Psychiatric: She has a normal mood and affect. Her behavior is normal. Judgment and thought content normal.   Nursing note and vitals reviewed.    Current outpatient and discharge medications have been reconciled for the patient.  Reviewed by: CORNELL Fong    Assessment/Plan   Navi was seen today for transitional care management.    Diagnoses and all orders for this visit:    Hospital discharge follow-up  -     CBC No Differential  -     Basic Metabolic Panel  -     Hemoglobin A1c  -     Ambulatory Referral to Diabetic Education  -     XR Chest 2 View    Status post appendectomy    Urinary retention    Surgical site infection  -     CBC No Differential  -     Basic Metabolic Panel    Fatigue, unspecified type  -     XR Chest 2 View    Non-insulin dependent type 2 diabetes mellitus (CMS/HCC)  -     CBC No Differential  -     Hemoglobin A1c    -Hospital follow-up: Reviewed hospital documentation, labs and imaging.  Reviewed hospital course with patient.    -On physical exam today I am concerned with the area of cellulitis on her abdomen.  She is on day  3 out of 10 of Keflex.  She completed Augmentin already.  She follows up with general surgery, Dr. Beal on 7/24/2020 and will keep this appointment.  I have asked her to monitor the site for any spread of the redness and for development of fevers.  I went to ensure that she is not developing a surgical site abscess.  We will recheck her white blood cell count today.    -Urine retention: Resolved.  She is voiding at home with no complaints.  Urine seems clear to patient.    -Chest x-ray today to rule out postoperative pneumonia as contributor to her ongoing fatigue.    -Type 2 diabetes: Blood sugars have been running a bit higher lately, as high as the mid 200s per patient.  I asked her to monitor her fasting blood sugars once daily and record readings in a log.  Continue metformin  mg daily.  A1c today and we will adjust therapy if needed based upon results.    -We will contact patient with her lab results and any further recommendations.  Follow-up PRN and I will see her back next month for her previously scheduled office visit.

## 2020-07-23 ENCOUNTER — OFFICE VISIT (OUTPATIENT)
Dept: SURGERY | Facility: CLINIC | Age: 80
End: 2020-07-23

## 2020-07-23 DIAGNOSIS — T14.8XXA OPEN WOUND: Primary | ICD-10-CM

## 2020-07-23 DIAGNOSIS — L02.216 ABSCESS, UMBILICAL: ICD-10-CM

## 2020-07-23 PROCEDURE — 87186 SC STD MICRODIL/AGAR DIL: CPT | Performed by: SURGERY

## 2020-07-23 PROCEDURE — 87205 SMEAR GRAM STAIN: CPT | Performed by: SURGERY

## 2020-07-23 PROCEDURE — 99024 POSTOP FOLLOW-UP VISIT: CPT | Performed by: SURGERY

## 2020-07-23 PROCEDURE — 87077 CULTURE AEROBIC IDENTIFY: CPT | Performed by: SURGERY

## 2020-07-23 PROCEDURE — 87070 CULTURE OTHR SPECIMN AEROBIC: CPT | Performed by: SURGERY

## 2020-07-23 NOTE — PROGRESS NOTES
CC: Follow-up after appendectomy    S: Patient is here today for follow-up after undergoing laparoscopic appendectomy on 7/8/2020.  Her postoperative course was consistent with persistent postoperative ileus.  She was eventually discharged home with no major problem.  She called answering service during the weekend and she was placed on antibiotics because of concern of cellulitis of the wound.  Yesterday she noticed drainage from her umbilical wound that was yellowish in nature.  She is here today for follow-up.  Reports pain over the umbilical area.  Denies any fevers or chills.  She has been tolerating diet without any nausea or vomiting    O: Alert oriented x3, no acute distress  Abdomen soft, tender over the umbilical area.  There is erythema and small amount of purulent drainage.  There is no rebound or guarding and no peritoneal signs.  Otherwise, left lower quadrant and suprapubic incisions are healing well.    Pathology:   Final Diagnosis   1. Appendix, Appendectomy:  Benign appendix with                A. Acute appendicitis.               B. Serositis.     White blood cell count 14, same as 8 days ago    Assessment and plan    79-year-old female status post laparoscopic appendectomy with findings concerning for umbilical abscess at the incision site.    I opened the wound bedside after 2% lidocaine with epinephrine was injected.  Large amount of purulent fluid was drained.  Cultures were sent for analysis.   I irrigated the wound that was approximately 3 cm deep.  The fascia was intact.  I packed the wound with half-inch iodoform gauze and cover the wound with saline and gauze.    Leukocytosis, likely secondary to umbilical abscess.  Discussed with patient about further step.  She should follow-up in my office tomorrow for dressing removal and teaching of wet-to-dry dressing changes.  Her son was present during the whole encounter and will be willing to help with dressing changes at home.  I placed a  referral for home health to help her with dressing changes.    She should continue antibiotics for now we will wait for culture results.    Patient understood

## 2020-07-23 NOTE — PROGRESS NOTES
Please notify patient that her chest x-ray is clear and showing no evidence of pneumonia.  Her white blood cell count is still a bit high which reflects the infection of her abdominal region.  I see that she is seeing general surgery today.  Please do keep any follow-ups with general surgery as I want to ensure that she is not developing an abscess around this region.  A1c is slightly lower than last check at 7.4.  For now I will not adjust medications but when I see her again next month we will discuss her blood sugars and possible medication adjustments.  Her sodium was a bit low.  I would like to recheck this when I see her next as well.  Please encourage Pedialyte for electrolyte replacement.

## 2020-07-24 ENCOUNTER — READMISSION MANAGEMENT (OUTPATIENT)
Dept: CALL CENTER | Facility: HOSPITAL | Age: 80
End: 2020-07-24

## 2020-07-24 ENCOUNTER — OFFICE VISIT (OUTPATIENT)
Dept: SURGERY | Facility: CLINIC | Age: 80
End: 2020-07-24

## 2020-07-24 DIAGNOSIS — Z51.89 VISIT FOR WOUND CHECK: ICD-10-CM

## 2020-07-24 DIAGNOSIS — T14.8XXA OPEN WOUND: Primary | ICD-10-CM

## 2020-07-24 DIAGNOSIS — L02.216 ABSCESS, UMBILICAL: ICD-10-CM

## 2020-07-24 PROCEDURE — 99024 POSTOP FOLLOW-UP VISIT: CPT | Performed by: SURGERY

## 2020-07-24 RX ORDER — MAGNESIUM HYDROXIDE 1200 MG/15ML
LIQUID ORAL
Qty: 1000 ML | Refills: 2 | Status: SHIPPED | OUTPATIENT
Start: 2020-07-24 | End: 2020-09-01

## 2020-07-24 NOTE — OUTREACH NOTE
General Surgery Week 2 Survey      Responses   [facility] patient discharged from?  [facility]   Does the patient have one of the following disease processes/diagnoses(primary or secondary)?  General Surgery   Week 2 attempt successful?  Yes   Call start time  1504   Call end time  1513   Discharge diagnosis  Acute appendicitis, s/p appendectomy lap   Meds reviewed with patient/caregiver?  Yes   Prescription comments  Patient is on 2 antibiotics due to infection   Is the patient taking all medications as directed (includes completed medication regime)?  Yes   Has the patient kept scheduled appointments due by today?  N/A   What is the Home health agency?   Patient now has [facility] ordered, they will be calling tomorrow.    Psychosocial issues?  No   Comments  Patient reports she presented with an infection and provider made an incision to remove infection  She is changing gauze 2x per day at this time.    Did the patient receive a copy of their discharge instructions?  Yes   Nursing interventions  Reviewed instructions with patient   What is the patient's perception of their health status since discharge?  Same   Is the patient/caregiver able to teach back signs and symptoms of incisional infection?  Increased redness, swelling or pain at the incisonal site, Increased drainage or bleeding, Incisional warmth, Pus or odor from incision, Fever   Is the patient/caregiver able to teach back the hierarchy of who to call/visit for symptoms/problems? PCP, Specialist, Home health nurse, Urgent Care, ED, 911  Yes   Week 2 call completed?  Yes   Wrap up additional comments  Patient is concerned she will not be able to change dressing 2x per day.  she was advised to call PCP's office to inquire about any further assistance that may be available.            Ashley Cisneros RN

## 2020-07-24 NOTE — PROGRESS NOTES
CC: Follow-up incision and drainage of incisional abscess    S: Patient is here today for follow-up after undergoing incision and drainage of incisional umbilical abscess yesterday.  She reports improvement of her pain.  Reports minimal drainage.  No fevers or chills    O:   Alert oriented x3, no acute distress  Abdomen soft, nontender nondistended, umbilical incision open with inflamed tissue at the base.  No purulent drainage  Packing was removed    Wound culture pending    Assessment and plan    79-year-old female status post laparoscopic appendectomy that was complicated by incisional abscess at the umbilicus.  Her wound was open yesterday and packed.  Today I removed the packing and started wet-to-dry dressing changes.  I instructed her son how to perform dressing changes with saline and gauze at least twice a day.  She can have shower in between dressing changes.  Home health has been requested.  Patient to continue antibiotics for a full course that has already been prescribed.  Cultures pending    -Follow-up in my office next Friday or earlier if having any issues.    She understood    Guzman Beal MD, FACS  General, Minimally Invasive and Endoscopic Surgery  [facility]    [address], [address]  [place], [place], [address]  P: [phone]  F: [phone]

## 2020-07-26 LAB
BACTERIA SPEC AEROBE CULT: ABNORMAL
GRAM STN SPEC: ABNORMAL
GRAM STN SPEC: ABNORMAL

## 2020-07-27 DIAGNOSIS — E11.9 NON-INSULIN DEPENDENT TYPE 2 DIABETES MELLITUS (HCC): ICD-10-CM

## 2020-07-27 RX ORDER — METFORMIN HYDROCHLORIDE 500 MG/1
TABLET, EXTENDED RELEASE ORAL
Qty: 30 TABLET | Refills: 2 | Status: SHIPPED | OUTPATIENT
Start: 2020-07-27 | End: 2020-08-28 | Stop reason: SDUPTHER

## 2020-07-28 ENCOUNTER — TELEPHONE (OUTPATIENT)
Dept: SURGERY | Facility: CLINIC | Age: 80
End: 2020-07-28

## 2020-07-28 NOTE — TELEPHONE ENCOUNTER
I spoke to the patient and her daughter. They were wanting HH to come out and do daily changes. They were not happy with [facility] and the nurse.She states she did not bring her any supplies and was a no show one time. They have requested [facility].  I called [facility] and they will do not do daily changes, insurance will not cover as well. [facility] said they will come out 1x weekly. Patient said she will get her own supplies and her son or someone will do the changes   I discharged [facility] today.

## 2020-07-31 ENCOUNTER — OFFICE VISIT (OUTPATIENT)
Dept: SURGERY | Facility: CLINIC | Age: 80
End: 2020-07-31

## 2020-07-31 ENCOUNTER — READMISSION MANAGEMENT (OUTPATIENT)
Dept: CALL CENTER | Facility: HOSPITAL | Age: 80
End: 2020-07-31

## 2020-07-31 VITALS — BODY MASS INDEX: 28 KG/M2 | HEIGHT: 64 IN

## 2020-07-31 DIAGNOSIS — Z51.89 VISIT FOR WOUND CHECK: Primary | ICD-10-CM

## 2020-07-31 PROCEDURE — 99024 POSTOP FOLLOW-UP VISIT: CPT | Performed by: SURGERY

## 2020-07-31 NOTE — PROGRESS NOTES
"CC: Follow-up incision and drainage of incisional abscess    S: Patient is here today for follow-up after undergoing incision and drainage of umbilical abscess.  She is doing great and denies any complaint.  Minimal abdominal discomfort at incision.  Perform wet-to-dry dressing changes twice a day    O:   Vitals:    07/31/20 0836   Height: 162.6 cm (64.02\")     Alert oriented x3, no acute distress  Abdomen soft, nontender, open umbilical incision with fibrinous exudate at the base.  Smaller than before no drainage    Wound Culture Light growth (2+) Escherichia coliAbnormal               Gram Stain  Few (2+) Gram negative bacilli      Moderate (3+) WBCs seen            Resulting Agency: [facility]   Susceptibility      Escherichia coli     GAGAN     Ampicillin >=32 ug/ml Resistant     Ampicillin + Sulbactam 16 ug/ml Intermediate     Cefepime <=1 ug/ml Susceptible     Ceftazidime <=1 ug/ml Susceptible     Ceftriaxone <=1 ug/ml Susceptible     Gentamicin <=1 ug/ml Susceptible     Levofloxacin <=0.12 ug/ml Susceptible     Piperacillin + Tazobactam <=4 ug/ml Susceptible     Tetracycline <=1 ug/ml Susceptible     Trimethoprim + Sulfamethoxazole <=20 ug/ml Susceptible         Assessment and plan     79-year-old female status post laparoscopic appendectomy that was complicated by incisional abscess at the umbilicus.  She has been performing wet-to-dry dressing changes and the wound is healing slowly.  I debrided a small amount of fibrinous exudate and remove a short string of Vicryl suture.  I packed the wound.  Discussed with him about the need to continue wet-to-dry dressing changes at least twice a day.  No need for more antibiotics.  She will follow-up in my office in 2 weeks.  She understood    Guzman Beal MD, FACS  General, Minimally Invasive and Endoscopic Surgery  [facility]    [address], [address]  [place], [place], [address]  P: [phone]  F: [phone]   "

## 2020-07-31 NOTE — OUTREACH NOTE
General Surgery Week 3 Survey      Responses   [facility] patient discharged from?  [facility]   Does the patient have one of the following disease processes/diagnoses(primary or secondary)?  General Surgery   Week 3 attempt successful?  Yes   Call start time  1601   Call end time  1602   Discharge diagnosis  Acute appendicitis, s/p appendectomy lap   Meds reviewed with patient/caregiver?  Yes   Is the patient taking all medications as directed (includes completed medication regime)?  Yes   Does the patient have a follow up appointment scheduled with their surgeon?  Yes [7/31/20]   Has the patient kept scheduled appointments due by today?  Yes   What is the patient's perception of their health status since discharge?  Improving   Is the patient /caregiver able to teach back basic post-op care?  Drive as instructed by MD in discharge instructions   Is the patient/caregiver able to teach back steps to recovery at home?  Set small, achievable goals for return to baseline health   If the patient is a current smoker, are they able to teach back resources for cessation?  -- [Nonsmoker]   Week 3 call completed?  Yes   Revoked  No further contact(revokes)-requires comment   Graduated/Revoked comments  Pt is doing well          Silvina Cuadra RN

## 2020-08-14 ENCOUNTER — OFFICE VISIT (OUTPATIENT)
Dept: SURGERY | Facility: CLINIC | Age: 80
End: 2020-08-14

## 2020-08-14 VITALS — WEIGHT: 163.6 LBS | BODY MASS INDEX: 27.93 KG/M2 | HEIGHT: 64 IN

## 2020-08-14 DIAGNOSIS — Z51.89 VISIT FOR WOUND CHECK: Primary | ICD-10-CM

## 2020-08-14 PROCEDURE — 99024 POSTOP FOLLOW-UP VISIT: CPT | Performed by: SURGERY

## 2020-08-14 NOTE — PROGRESS NOTES
CC: Follow-up incision and drainage of incisional abscess     S: Patient is here today for follow-up after undergoing incision and drainage of umbilical abscess.  She is doing great and denies any complaint.  Minimal abdominal discomfort at incision.  Perform wet-to-dry dressing changes twice a day     O:   Alert oriented x3, no acute distress  Abdomen soft, nontender, open umbilical incision with granulation tissue at the base. 1 cm wide. No drainage    Assessment and plan    79-year-old female status post laparoscopic appendectomy that was complicated by incisional abscess at the umbilicus.  She has been performing wet-to-dry dressing changes and the wound is healing slowly.  The wound looks much better and I do not feel she need to continue wet-to-dry dressing changes.  Discussed with her about the need to place antibiotic ointment over the open skin area twice a day and keep it covered if having drainage.  She will follow-up in my office in 2 weeks    Guzman Beal MD, FACS  General, Minimally Invasive and Endoscopic Surgery  [facility]    [address], [address]  [place], [place], [address]  P: [phone]  F: [phone]

## 2020-08-26 RX ORDER — METOPROLOL SUCCINATE 50 MG/1
TABLET, EXTENDED RELEASE ORAL
Qty: 90 TABLET | Refills: 1 | Status: SHIPPED | OUTPATIENT
Start: 2020-08-26 | End: 2020-11-30

## 2020-08-28 ENCOUNTER — OFFICE VISIT (OUTPATIENT)
Dept: INTERNAL MEDICINE | Facility: CLINIC | Age: 80
End: 2020-08-28

## 2020-08-28 VITALS
OXYGEN SATURATION: 96 % | SYSTOLIC BLOOD PRESSURE: 104 MMHG | DIASTOLIC BLOOD PRESSURE: 60 MMHG | TEMPERATURE: 97.8 F | HEIGHT: 64 IN | BODY MASS INDEX: 28 KG/M2 | HEART RATE: 70 BPM | WEIGHT: 164 LBS

## 2020-08-28 DIAGNOSIS — I10 ESSENTIAL HYPERTENSION: ICD-10-CM

## 2020-08-28 DIAGNOSIS — E11.9 NON-INSULIN DEPENDENT TYPE 2 DIABETES MELLITUS (HCC): Primary | ICD-10-CM

## 2020-08-28 DIAGNOSIS — Z00.00 HEALTHCARE MAINTENANCE: ICD-10-CM

## 2020-08-28 DIAGNOSIS — F32.89 OTHER DEPRESSION: ICD-10-CM

## 2020-08-28 DIAGNOSIS — E78.2 MIXED HYPERLIPIDEMIA: ICD-10-CM

## 2020-08-28 DIAGNOSIS — Z90.49 STATUS POST APPENDECTOMY: ICD-10-CM

## 2020-08-28 PROCEDURE — 99214 OFFICE O/P EST MOD 30 MIN: CPT | Performed by: NURSE PRACTITIONER

## 2020-08-28 RX ORDER — METFORMIN HYDROCHLORIDE 500 MG/1
1000 TABLET, EXTENDED RELEASE ORAL
Qty: 180 TABLET | Refills: 1 | Status: SHIPPED | OUTPATIENT
Start: 2020-08-28 | End: 2020-12-21

## 2020-08-28 NOTE — PROGRESS NOTES
Subjective   Navi Kinney is a 79 y.o. female.   Chief Complaint   Patient presents with   • Hyperlipidemia     follow up   • Hypertension       Patient presents for 6-month follow-up.  This is a 79-year-old female.    She has hypertension treated with Imdur 30 mg daily, amlodipine-benazepril 2.5-10 mg daily, chlorthalidone 25 mg daily reporting good compliance with these medications.  Blood pressure well controlled today at 104/60.  No headache, visual changes, shortness of breath or chest discomfort.    She has depression and takes Cymbalta 30 mg daily.  No SI or HI.  This works well for her.    She has type 2 diabetes.  Last A1c was at 7.4 on 7/22/2020.  Currently taking metformin  mg daily reporting good compliance with this medication.    In July of this year she had an appendectomy with multiple complications following the surgery.  Most recently she was treated for an abscess that developed around the incision site.  She is seeing Dr. Beal, general surgery.  She was last seen by his office on 8/14/2020.  At that time wet-to-dry dressing changes were discontinued.  She has been using antibiotic ointment and rinsing the area with water in the shower.  No further redness and no further open areas.  She sees Dr. Beal next week for follow-up.  No fevers or chills.    She denies development of any other new issues today.       The following portions of the patient's history were reviewed and updated as appropriate: allergies, current medications, past family history, past medical history, past social history, past surgical history and problem list.    Review of Systems   Constitutional: Negative for activity change, chills, fatigue, fever, unexpected weight gain and unexpected weight loss.   HENT: Negative for congestion, hearing loss, postnasal drip, sinus pressure, sneezing, sore throat, swollen glands and tinnitus.    Eyes: Negative for photophobia, pain and visual disturbance.   Respiratory: Negative  "for cough, chest tightness, shortness of breath and wheezing.    Cardiovascular: Negative for chest pain, palpitations and leg swelling.   Gastrointestinal: Negative for abdominal distention, abdominal pain, constipation, diarrhea, nausea and vomiting.   Endocrine: Negative for polydipsia, polyphagia and polyuria.   Genitourinary: Negative for dysuria, frequency, hematuria and urgency.   Neurological: Negative for dizziness, weakness, numbness and headache.   All other systems reviewed and are negative.      Objective    /60   Pulse 70   Temp 97.8 °F (36.6 °C)   Ht 162.6 cm (64\")   Wt 74.4 kg (164 lb)   LMP  (LMP Unknown)   SpO2 96%   BMI 28.15 kg/m²     Physical Exam   Constitutional: She is oriented to person, place, and time. She appears well-developed and well-nourished. No distress.   HENT:   Head: Atraumatic.   Eyes: Pupils are equal, round, and reactive to light. EOM are normal.   Neck: Normal range of motion. Neck supple. No JVD present. No tracheal deviation present. No thyromegaly present.   No carotid bruits auscultated   Cardiovascular: Normal rate, regular rhythm, normal heart sounds and intact distal pulses. Exam reveals no gallop and no friction rub.   No murmur heard.  Pulmonary/Chest: Effort normal and breath sounds normal. No stridor. No respiratory distress. She has no wheezes. She has no rales. She exhibits no tenderness.   Lungs are CTA bilaterally   Abdominal: Soft. Bowel sounds are normal. She exhibits no distension. There is no tenderness.   Bowel sounds active x4 quadrants.  No pain to light or deep palpation x4 quadrants.   Musculoskeletal: Normal range of motion.   Lymphadenopathy:     She has no cervical adenopathy.   Neurological: She is alert and oriented to person, place, and time.   Skin: Skin is warm and dry. Capillary refill takes less than 2 seconds. She is not diaphoretic. No erythema.   No erythema to abdomen.  No induration or heat.  Healed surgical laparoscopic " sites.   Psychiatric: She has a normal mood and affect. Her behavior is normal. Judgment and thought content normal.   Nursing note and vitals reviewed.    Current outpatient and discharge medications have been reconciled for the patient.  Reviewed by: CORNELL Fong      Assessment/Plan   Navi was seen today for hyperlipidemia and hypertension.    Diagnoses and all orders for this visit:    Non-insulin dependent type 2 diabetes mellitus (CMS/HCC)  -     metFORMIN ER (GLUCOPHAGE-XR) 500 MG 24 hr tablet; Take 2 tablets by mouth Daily With Breakfast.    Essential hypertension  -     CBC No Differential  -     Basic Metabolic Panel    Mixed hyperlipidemia    Other depression    Status post appendectomy    Healthcare maintenance  -     Hepatitis C antibody      -Type 2 diabetes: Increasing metformin XR from 500 to 1000 mg daily.  My goal for her A1c is less than 7.  Currently 7.4, last checked 7/22/2020.  She will decrease carbs and sugars as well and we will recheck A1c at next follow-up.    -Hypertension: Well controlled today at 104/60.  Routine home monitoring recommended for goal of less than 130/80.  Continue amlodipine-benazepril, chlorthalidone, Imdur.    -Hyperlipidemia: Continue diet modifications.  Lipid panel at next follow-up as she is not fasting today.    -Depression: Stable Cymbalta, continue current therapy.    -Status post appendectomy: Following up with general surgery, Dr. Beal next week.  Her abscess has healed.  No further evidence of infection.  Rechecking CBC today to ensure leukocytosis resolution.    -Healthcare maintenance: Discussed diet and exercise modifications.  She is going to try to increase walking throughout the week.  One-time hep C screen today.    We will contact patient with her lab results and any further recommendations.  Follow-up PRN and I will see her back in 6 months for a Medicare wellness visit.

## 2020-08-29 LAB
BUN SERPL-MCNC: 21 MG/DL (ref 8–23)
BUN/CREAT SERPL: 21.9 (ref 7–25)
CALCIUM SERPL-MCNC: 9.5 MG/DL (ref 8.6–10.5)
CHLORIDE SERPL-SCNC: 99 MMOL/L (ref 98–107)
CO2 SERPL-SCNC: 25.6 MMOL/L (ref 22–29)
CREAT SERPL-MCNC: 0.96 MG/DL (ref 0.57–1)
ERYTHROCYTE [DISTWIDTH] IN BLOOD BY AUTOMATED COUNT: 13.2 % (ref 12.3–15.4)
GLUCOSE SERPL-MCNC: 107 MG/DL (ref 65–99)
HCT VFR BLD AUTO: 39.2 % (ref 34–46.6)
HCV AB S/CO SERPL IA: <0.1 S/CO RATIO (ref 0–0.9)
HGB BLD-MCNC: 13.1 G/DL (ref 12–15.9)
MCH RBC QN AUTO: 31.4 PG (ref 26.6–33)
MCHC RBC AUTO-ENTMCNC: 33.4 G/DL (ref 31.5–35.7)
MCV RBC AUTO: 94 FL (ref 79–97)
PLATELET # BLD AUTO: 324 10*3/MM3 (ref 140–450)
POTASSIUM SERPL-SCNC: 3.8 MMOL/L (ref 3.5–5.2)
RBC # BLD AUTO: 4.17 10*6/MM3 (ref 3.77–5.28)
SODIUM SERPL-SCNC: 136 MMOL/L (ref 136–145)
WBC # BLD AUTO: 8.12 10*3/MM3 (ref 3.4–10.8)

## 2020-08-31 NOTE — PROGRESS NOTES
Please notify patient that her blood count looks perfect, no elevation of white blood cells and no anemia.  Metabolic panel looks stable including kidney function, electrolytes.

## 2020-09-01 ENCOUNTER — OFFICE VISIT (OUTPATIENT)
Dept: SURGERY | Facility: CLINIC | Age: 80
End: 2020-09-01

## 2020-09-01 DIAGNOSIS — Z51.89 VISIT FOR WOUND CHECK: Primary | ICD-10-CM

## 2020-09-01 PROCEDURE — 99024 POSTOP FOLLOW-UP VISIT: CPT | Performed by: SURGERY

## 2020-09-01 NOTE — PROGRESS NOTES
CC: Follow-up incision and drainage of incisional abscess     S: Patient is here today for follow-up after undergoing incision and drainage of umbilical abscess.  She is doing great and denies any complaint.  The wound has completely healed and she is only been applying antibiotic ointment     O:   Alert oriented x3, no acute distress  Abdomen soft, umbilical opening is completely healed, no signs of infection or drainage     Assessment and plan     79-year-old female status post laparoscopic appendectomy that was complicated by incisional abscess at the umbilicus.  The wound is completely healed and there is no signs of infection.  Discussed with the patient that she does not need to follow-up with me unless having any problem.  She understood

## 2020-09-07 RX ORDER — AMLODIPINE BESYLATE AND BENAZEPRIL HYDROCHLORIDE 2.5; 1 MG/1; MG/1
CAPSULE ORAL
Qty: 90 CAPSULE | Refills: 1 | Status: SHIPPED | OUTPATIENT
Start: 2020-09-07 | End: 2021-03-24

## 2020-09-15 RX ORDER — DULOXETIN HYDROCHLORIDE 30 MG/1
30 CAPSULE, DELAYED RELEASE ORAL NIGHTLY
Qty: 90 CAPSULE | Refills: 1 | Status: SHIPPED | OUTPATIENT
Start: 2020-09-15 | End: 2020-12-16

## 2020-09-15 NOTE — TELEPHONE ENCOUNTER
Caller: Navi Kinney    Relationship: Self    Best call back number: [phone]     Medication needed:   Requested Prescriptions     Pending Prescriptions Disp Refills   • DULoxetine (CYMBALTA) 30 MG capsule 90 capsule 2     Sig: Take 1 capsule by mouth Every Night.         Does the patient have less than a 3 day supply:  [x] Yes  [] No    What is the patient's preferred pharmacy: [facility] - [place], [place] - [address] - [phone] [facility] [phone] FX

## 2020-09-21 ENCOUNTER — TELEPHONE (OUTPATIENT)
Dept: INTERNAL MEDICINE | Facility: CLINIC | Age: 80
End: 2020-09-21

## 2020-09-21 NOTE — TELEPHONE ENCOUNTER
Patient called in and stated she pulled something on her lower back on the left said and has shooting pain and wanted to know if she should be seen or if something could be called in for her .       Sent to [facility] - [place], [place] - [address] [facility] - [phone] [facility] [phone] FX  [phone]        Please call patient and advise at [phone]

## 2020-09-23 ENCOUNTER — OFFICE VISIT (OUTPATIENT)
Dept: INTERNAL MEDICINE | Facility: CLINIC | Age: 80
End: 2020-09-23

## 2020-09-23 ENCOUNTER — TELEPHONE (OUTPATIENT)
Dept: INTERNAL MEDICINE | Facility: CLINIC | Age: 80
End: 2020-09-23

## 2020-09-23 VITALS
DIASTOLIC BLOOD PRESSURE: 75 MMHG | RESPIRATION RATE: 16 BRPM | TEMPERATURE: 97.5 F | HEART RATE: 70 BPM | SYSTOLIC BLOOD PRESSURE: 125 MMHG | OXYGEN SATURATION: 97 % | BODY MASS INDEX: 27.83 KG/M2 | HEIGHT: 64 IN | WEIGHT: 163 LBS

## 2020-09-23 DIAGNOSIS — M62.830 SPASM OF MUSCLE OF LOWER BACK: Primary | ICD-10-CM

## 2020-09-23 LAB
BACTERIA UR QL AUTO: ABNORMAL /HPF
BILIRUB UR QL STRIP: NEGATIVE
CLARITY UR: ABNORMAL
COLOR UR: YELLOW
GLUCOSE UR STRIP-MCNC: NEGATIVE MG/DL
HGB UR QL STRIP.AUTO: NEGATIVE
HYALINE CASTS UR QL AUTO: ABNORMAL /LPF
KETONES UR QL STRIP: NEGATIVE
LEUKOCYTE ESTERASE UR QL STRIP.AUTO: ABNORMAL
MUCOUS THREADS URNS QL MICRO: ABNORMAL /HPF
NITRITE UR QL STRIP: NEGATIVE
PH UR STRIP.AUTO: 5.5 [PH] (ref 5–8)
PROT UR QL STRIP: NEGATIVE
RBC # UR: ABNORMAL /HPF
REF LAB TEST METHOD: ABNORMAL
SP GR UR STRIP: 1.02 (ref 1–1.03)
SQUAMOUS #/AREA URNS HPF: ABNORMAL /HPF
TRANS CELLS #/AREA URNS HPF: ABNORMAL /HPF
UROBILINOGEN UR QL STRIP: ABNORMAL
WBC UR QL AUTO: ABNORMAL /HPF

## 2020-09-23 PROCEDURE — 81001 URINALYSIS AUTO W/SCOPE: CPT | Performed by: NURSE PRACTITIONER

## 2020-09-23 PROCEDURE — 99213 OFFICE O/P EST LOW 20 MIN: CPT | Performed by: NURSE PRACTITIONER

## 2020-09-23 RX ORDER — TIZANIDINE HYDROCHLORIDE 2 MG/1
2-4 CAPSULE, GELATIN COATED ORAL 3 TIMES DAILY PRN
Qty: 30 CAPSULE | Refills: 0 | Status: SHIPPED | OUTPATIENT
Start: 2020-09-23 | End: 2020-09-23 | Stop reason: SDUPTHER

## 2020-09-23 RX ORDER — TIZANIDINE 2 MG/1
2-4 TABLET ORAL EVERY 8 HOURS PRN
Qty: 30 TABLET | Refills: 0 | Status: SHIPPED | OUTPATIENT
Start: 2020-09-23 | End: 2020-12-09

## 2020-09-23 RX ORDER — CEFDINIR 300 MG/1
300 CAPSULE ORAL 2 TIMES DAILY
Qty: 14 CAPSULE | Refills: 0 | Status: SHIPPED | OUTPATIENT
Start: 2020-09-23 | End: 2020-09-30

## 2020-09-23 NOTE — PATIENT INSTRUCTIONS
Heat  Tylenol  Muscle relaxer- use caution as they can cause drowsiness      Lumbar Strain  A lumbar strain, which is sometimes called a low-back strain, is a stretch or tear in a muscle or the strong cords of tissue that attach muscle to bone (tendons) in the lower back (lumbar spine). This type of injury occurs when muscles or tendons are torn or are stretched beyond their limits.  Lumbar strains can range from mild to severe. Mild strains may involve stretching a muscle or tendon without tearing it. These may heal in 1-2 weeks. More severe strains involve tearing of muscle fibers or tendons. These will cause more pain and may take 6-8 weeks to heal.  What are the causes?  This condition may be caused by:  · Trauma, such as a fall or a hit to the body.  · Twisting or overstretching the back. This may result from doing activities that need a lot of energy, such as lifting heavy objects.  What increases the risk?  This injury is more common in:  · Athletes.  · People with obesity.  · People who do repeated lifting, bending, or other movements that involve their back.  What are the signs or symptoms?  Symptoms of this condition may include:  · Sharp or dull pain in the lower back that does not go away. The pain may extend to the buttocks.  · Stiffness or limited range of motion.  · Sudden muscle tightening (spasms).  How is this diagnosed?  This condition may be diagnosed based on:  · Your symptoms.  · Your medical history.  · A physical exam.  · Imaging tests, such as:  ? X-rays.  ? MRI.  How is this treated?  Treatment for this condition may include:  · Rest.  · Applying heat and cold to the affected area.  · Over-the-counter medicines to help relieve pain and inflammation, such as NSAIDs.  · Prescription pain medicine and muscle relaxants may be needed for a short time.  · Physical therapy.  Follow these instructions at home:  Managing pain, stiffness, and swelling         · If directed, put ice on the injured  area during the first 24 hours after your injury.  ? Put ice in a plastic bag.  ? Place a towel between your skin and the bag.  ? Leave the ice on for 20 minutes, 2-3 times a day.  · If directed, apply heat to the affected area as often as told by your health care provider. Use the heat source that your health care provider recommends, such as a moist heat pack or a heating pad.  ? Place a towel between your skin and the heat source.  ? Leave the heat on for 20-30 minutes.  ? Remove the heat if your skin turns bright red. This is especially important if you are unable to feel pain, heat, or cold. You may have a greater risk of getting burned.  Activity  · Rest and return to your normal activities as told by your health care provider. Ask your health care provider what activities are safe for you.  · Do exercises as told by your health care provider.  Medicines  · Take over-the-counter and prescription medicines only as told by your health care provider.  · Ask your health care provider if the medicine prescribed to you:  ? Requires you to avoid driving or using heavy machinery.  ? Can cause constipation. You may need to take these actions to prevent or treat constipation:  § Drink enough fluid to keep your urine pale yellow.  § Take over-the-counter or prescription medicines.  § Eat foods that are high in fiber, such as beans, whole grains, and fresh fruits and vegetables.  § Limit foods that are high in fat and processed sugars, such as fried or sweet foods.  Injury prevention  To prevent a future low-back injury:  · Always warm up properly before physical activity or sports.  · Cool down and stretch after being active.  · Use correct form when playing sports and lifting heavy objects. Bend your knees before you lift heavy objects.  · Use good posture when sitting and standing.  · Stay physically fit and keep a healthy weight.  ? Do at least 150 minutes of moderate-intensity exercise each week, such as brisk  walking or water aerobics.  ? Do strength exercises at least 2 times each week.    General instructions  · Do not use any products that contain nicotine or tobacco, such as cigarettes, e-cigarettes, and chewing tobacco. If you need help quitting, ask your health care provider.  · Keep all follow-up visits as told by your health care provider. This is important.  Contact a health care provider if:  · Your back pain does not improve after 6 weeks of treatment.  · Your symptoms get worse.  Get help right away if:  · Your back pain is severe.  · You are unable to stand or walk.  · You develop pain in your legs.  · You develop weakness in your buttocks or legs.  · You have difficulty controlling when you urinate or when you have a bowel movement.  ? You have frequent, painful, or bloody urination.  ? You have a temperature over 101.0°F (38.3°C)  Summary  · A lumbar strain, which is sometimes called a low-back strain, is a stretch or tear in a muscle or the strong cords of tissue that attach muscle to bone (tendons) in the lower back (lumbar spine).  · This type of injury occurs when muscles or tendons are torn or are stretched beyond their limits.  · Rest and return to your normal activities as told by your health care provider. If directed, apply heat and ice to the affected area as often as told by your health care provider.  · Take over-the-counter and prescription medicines only as told by your health care provider.  · Contact a health care provider if you have new or worsening symptoms.  This information is not intended to replace advice given to you by your health care provider. Make sure you discuss any questions you have with your health care provider.  Document Released: 12/18/2006 Document Revised: 10/17/2019 Document Reviewed: 10/17/2019  Elsevier Patient Education © 2020 [employer]

## 2020-09-23 NOTE — PROGRESS NOTES
Please let patient know that her urinalysis is showing signs of urinary tract infection and I am sending in Omnicef twice daily x7 days.  Urine is sent for culture and I will let her know these results.

## 2020-09-23 NOTE — PROGRESS NOTES
"Subjective   Navi Kinney is a 79 y.o. female.   Chief Complaint   Patient presents with   • Back Pain     Pt presents here today with low back pain, Pt thinks she \"pulled\" something.       Patient presents for evaluation of left lower back pain and spasm.  This is a 71-year-old female.  4 days ago she picked up and carried her granddaughter around.  Reports that she had not done this in quite a while.  She had no immediate pain after this but the next day developed left lower back pain.  The pain does not radiate.  She has tried no over-the-counter medications but has tried massage and heat, both of which helped slightly at the time.  Denies frequency, urgency or burning on urination.  No fever or chills.  She has not had any falls.  Denies development of any other new issues today.       The following portions of the patient's history were reviewed and updated as appropriate: allergies, current medications, past family history, past medical history, past social history, past surgical history and problem list.    Review of Systems   Constitutional: Negative for activity change, chills, fatigue, fever, unexpected weight gain and unexpected weight loss.   HENT: Negative for congestion, hearing loss, postnasal drip, sinus pressure, sneezing, sore throat, swollen glands and tinnitus.    Eyes: Negative for photophobia, pain and visual disturbance.   Respiratory: Negative for cough, chest tightness, shortness of breath and wheezing.    Cardiovascular: Negative for chest pain, palpitations and leg swelling.   Gastrointestinal: Negative for abdominal distention, abdominal pain, constipation, diarrhea, nausea and vomiting.   Endocrine: Negative for polydipsia, polyphagia and polyuria.   Genitourinary: Negative for dysuria, frequency, hematuria and urgency.   Musculoskeletal: Positive for back pain.   Neurological: Negative for dizziness, weakness, numbness and headache.   All other systems reviewed and are " "negative.      Objective    /75 (BP Location: Left arm, Patient Position: Sitting, Cuff Size: Adult)   Pulse 70   Temp 97.5 °F (36.4 °C) (Oral)   Resp 16   Ht 162.6 cm (64\")   Wt 73.9 kg (163 lb)   LMP  (LMP Unknown)   SpO2 97%   BMI 27.98 kg/m²     Physical Exam  Vitals signs and nursing note reviewed.   Constitutional:       General: She is not in acute distress.     Appearance: Normal appearance. She is well-developed. She is not ill-appearing.   HENT:      Head: Normocephalic and atraumatic.   Eyes:      Pupils: Pupils are equal, round, and reactive to light.   Neck:      Musculoskeletal: Normal range of motion and neck supple. No neck rigidity or muscular tenderness.      Vascular: No carotid bruit.   Cardiovascular:      Rate and Rhythm: Normal rate and regular rhythm.      Pulses: Normal pulses.      Heart sounds: Normal heart sounds.      Comments: No peripheral edema.  Posterior tib pulses 2+ and equal bilaterally.  Pulmonary:      Effort: Pulmonary effort is normal.      Breath sounds: Normal breath sounds.   Abdominal:      General: Bowel sounds are normal. There is no distension.      Palpations: Abdomen is soft.      Tenderness: There is no abdominal tenderness.   Musculoskeletal: Normal range of motion.      Lumbar back: She exhibits pain and spasm.        Back:       Comments: No pain to palpation of back/spine   Lymphadenopathy:      Cervical: No cervical adenopathy.   Skin:     Capillary Refill: Capillary refill takes less than 2 seconds.   Neurological:      General: No focal deficit present.      Mental Status: She is alert and oriented to person, place, and time. Mental status is at baseline.   Psychiatric:         Mood and Affect: Mood normal.         Behavior: Behavior normal.         Thought Content: Thought content normal.         Judgment: Judgment normal.       Current outpatient and discharge medications have been reconciled for the patient.  Reviewed by: Lulu Aleman, " APRN      Assessment/Plan   Navi was seen today for back pain.    Diagnoses and all orders for this visit:    Spasm of muscle of lower back  -     TiZANidine (Zanaflex) 2 MG capsule; Take 1-2 capsules by mouth 3 (Three) Times a Day As Needed for Muscle Spasms.  -     Urinalysis With Culture If Indicated - Urine, Clean Catch  -     diclofenac (VOLTAREN) 1 % gel gel; Apply 4 g topically to the appropriate area as directed 4 (Four) Times a Day As Needed (back or joint pain).      -Likely low back spasm r/t lifting her granddaughter. The pain is intermittent. Advised heat, Tylenol and prescribed diclofenac gel and Zanaflex 2-4 mg TID PRN for low back spasm.     -Follow up PRN if symptoms persist or worsen. Can order imagining and/or PT if needed based on symptom progression.     -Educated on potential side effects of muscle relaxers and she will use caution with them.     -Urinalysis today to rule out underling UTI although I suspect this is musculoskeletal.     Follow up PRN if symptoms persist or worsen and at next scheduled follow up apt.

## 2020-09-23 NOTE — TELEPHONE ENCOUNTER
[facility] STATES THAT INSURANCE WILL NOT PAY FOR:    TiZANidine (Zanaflex) 2 MG capsule [01910] (Order [id number])    THE CAPSULE BUT WILL PAY FOR 2 MG TABLET.  PLEASE ADVISE    [facility] - [facility] [place] - [address] [place] [address] RD - [phone]  - [phone] (Pharmacy)

## 2020-09-26 LAB
BACTERIA UR CULT: ABNORMAL
OTHER ANTIBIOTIC SUSC ISLT: ABNORMAL

## 2020-09-28 LAB — REF LAB TEST METHOD: NORMAL

## 2020-09-28 NOTE — PROGRESS NOTES
Please let patient know that her culture came back showing UTI that is susceptible to the Omnicef that I sent, please complete in full.

## 2020-11-03 ENCOUNTER — TELEPHONE (OUTPATIENT)
Dept: INTERNAL MEDICINE | Facility: CLINIC | Age: 80
End: 2020-11-03

## 2020-11-03 NOTE — TELEPHONE ENCOUNTER
Please have her contact her pharmacy to see if her specific batch was affected. Metformin itself is not a cancer-causing drug, there were specific batches produced that had carcinogenic additives.

## 2020-11-03 NOTE — TELEPHONE ENCOUNTER
Do you want to change this?  I know it sometimes depends upon if their specific batch was effected.  Please advise.

## 2020-11-03 NOTE — TELEPHONE ENCOUNTER
PATIENT RECEIVED A LETTER FROM [facility] THAT STATES SHE COULD GET CANCER FROM TAKING METFORMIN. SHE WANTS TO KNOW IF SHE SHOULD CONTINUE TAKING THIS MEDICATION OR STOP TAKING IT OR TAKE LESS.     PATIENT CALLBACK [phone]

## 2020-11-19 ENCOUNTER — OFFICE VISIT (OUTPATIENT)
Dept: INTERNAL MEDICINE | Facility: CLINIC | Age: 80
End: 2020-11-19

## 2020-11-19 VITALS
HEART RATE: 62 BPM | TEMPERATURE: 98.2 F | OXYGEN SATURATION: 94 % | DIASTOLIC BLOOD PRESSURE: 74 MMHG | RESPIRATION RATE: 17 BRPM | BODY MASS INDEX: 27.66 KG/M2 | SYSTOLIC BLOOD PRESSURE: 119 MMHG | WEIGHT: 162 LBS | HEIGHT: 64 IN

## 2020-11-19 DIAGNOSIS — K43.2 INCISIONAL HERNIA, WITHOUT OBSTRUCTION OR GANGRENE: Primary | ICD-10-CM

## 2020-11-19 PROCEDURE — 99213 OFFICE O/P EST LOW 20 MIN: CPT | Performed by: NURSE PRACTITIONER

## 2020-11-19 RX ORDER — ATORVASTATIN CALCIUM 20 MG/1
20 TABLET, FILM COATED ORAL DAILY
COMMUNITY
Start: 2020-11-13

## 2020-11-19 NOTE — PROGRESS NOTES
"Subjective   Navi Kinney is a 79 y.o. female.   CC: \"Swelling\" of abdomen around old surgery site     Patient presents for evaluation of abdominal \"swelling.\"  This is a 79-year-old female.  In July 2020 she had a complicated appendectomy that resulted in abscess formation and an open wound.  She followed with Dr. Beal, general surgery and was dismissed on 9/1/2020 after the wound had completely healed.  Over the past 3 weeks she has noticed \"swelling\" around the incision site only present when she is standing.  When she lies down this disappears.  She has had no bowel or bladder dysfunction and no abdominal pain or discoloration of the skin.  No fevers or chills.  She denies development of any other new issues today.       The following portions of the patient's history were reviewed and updated as appropriate: allergies, current medications, past family history, past medical history, past social history, past surgical history and problem list.    Review of Systems   Constitutional: Negative for activity change, chills, fatigue, fever, unexpected weight gain and unexpected weight loss.   HENT: Negative for congestion, hearing loss, postnasal drip, sinus pressure, sneezing, sore throat, swollen glands and tinnitus.    Eyes: Negative for photophobia, pain and visual disturbance.   Respiratory: Negative for cough, chest tightness, shortness of breath and wheezing.    Cardiovascular: Negative for chest pain, palpitations and leg swelling.   Gastrointestinal: Negative for abdominal distention, abdominal pain, constipation, diarrhea, nausea and vomiting.   Endocrine: Negative for polydipsia, polyphagia and polyuria.   Genitourinary: Negative for dysuria, frequency, hematuria and urgency.   Neurological: Negative for dizziness, weakness, numbness and headache.   All other systems reviewed and are negative.      Objective    /74 (BP Location: Right arm, Patient Position: Sitting, Cuff Size: Adult)   Pulse 62   " "Temp 98.2 °F (36.8 °C) (Oral)   Resp 17   Ht 162.6 cm (64\")   Wt 73.5 kg (162 lb)   LMP  (LMP Unknown)   SpO2 94%   BMI 27.81 kg/m²     Physical Exam  Vitals signs and nursing note reviewed.   Constitutional:       General: She is not in acute distress.     Appearance: Normal appearance. She is well-developed. She is not ill-appearing, toxic-appearing or diaphoretic.   HENT:      Head: Atraumatic.   Eyes:      Pupils: Pupils are equal, round, and reactive to light.   Neck:      Musculoskeletal: Normal range of motion and neck supple. No neck rigidity or muscular tenderness.      Vascular: No carotid bruit.   Cardiovascular:      Rate and Rhythm: Normal rate and regular rhythm.      Pulses: Normal pulses.      Heart sounds: Normal heart sounds.   Pulmonary:      Effort: Pulmonary effort is normal.      Breath sounds: Normal breath sounds.   Abdominal:      General: Bowel sounds are normal. There is no distension.      Palpations: Abdomen is soft.      Tenderness: There is no abdominal tenderness.      Hernia: A hernia (  incisional hernia vs scar tissue formation, L lower abdomen) is present.   Musculoskeletal: Normal range of motion.   Lymphadenopathy:      Cervical: No cervical adenopathy.   Skin:     General: Skin is warm and dry.      Capillary Refill: Capillary refill takes less than 2 seconds.   Neurological:      General: No focal deficit present.      Mental Status: She is alert and oriented to person, place, and time. Mental status is at baseline.   Psychiatric:         Mood and Affect: Mood normal.         Behavior: Behavior normal.         Thought Content: Thought content normal.         Judgment: Judgment normal.       Current outpatient and discharge medications have been reconciled for the patient.  Reviewed by: CORNELL Fong      Assessment/Plan   Diagnoses and all orders for this visit:    1. Incisional hernia, without obstruction or gangrene (Primary)  -     CT Abdomen Pelvis With Contrast; " Future      -Upon physical exam and HPI, likely incisional hernia formation versus scar tissue.  Will differentiate with CT abdomen and pelvis.  She is having no pain, bowel dysfunction/constipation.  If needed we can get her back to Dr. Beal for evaluation based on CT results.    -We will contact patient with her imaging results and any further recommendations.  Follow-up as needed and at next scheduled office visit.

## 2020-11-27 ENCOUNTER — HOSPITAL ENCOUNTER (OUTPATIENT)
Dept: CT IMAGING | Facility: HOSPITAL | Age: 80
Discharge: HOME | End: 2020-11-27
Admitting: NURSE PRACTITIONER

## 2020-11-27 DIAGNOSIS — K43.2 INCISIONAL HERNIA, WITHOUT OBSTRUCTION OR GANGRENE: ICD-10-CM

## 2020-11-27 LAB — CREAT BLDA-MCNC: 1.1 MG/DL (ref 0.6–1.3)

## 2020-11-27 PROCEDURE — 82565 ASSAY OF CREATININE: CPT

## 2020-11-27 PROCEDURE — 25010000002 IOPAMIDOL 61 % SOLUTION: Performed by: NURSE PRACTITIONER

## 2020-11-27 PROCEDURE — 74177 CT ABD & PELVIS W/CONTRAST: CPT

## 2020-11-27 RX ADMIN — IOPAMIDOL 85 ML: 612 INJECTION, SOLUTION INTRAVENOUS at 10:45

## 2020-11-30 DIAGNOSIS — K42.9 UMBILICAL HERNIA WITHOUT OBSTRUCTION AND WITHOUT GANGRENE: Primary | ICD-10-CM

## 2020-11-30 RX ORDER — METOPROLOL SUCCINATE 50 MG/1
TABLET, EXTENDED RELEASE ORAL
Qty: 90 TABLET | Refills: 1 | Status: SHIPPED | OUTPATIENT
Start: 2020-11-30 | End: 2021-06-01

## 2020-11-30 NOTE — PROGRESS NOTES
Please call pt-her abdominal CT is showing hernia around the umbilicus (bellybutton) containing some loops of her small bowel but no obstruction.  To be safe, considering her recent complicated postoperative course after her appendectomy, I am going to have her see her general surgeon, Dr. Beal.  I entered a referral.

## 2020-12-04 NOTE — PROGRESS NOTES
SURGERY  Navi Kinney   1940 12/09/2020    Chief Complaint:  hernia    HPI    Ms. Kinney is a nice 79 y.o. female who i did a recurrent incisional hernia repair on in 2018 which included both a recurrent subxyphoid and mediastinal chest tube sites, all in the upper abdomen.  This was repaired with underlay extraperitoneal polypropylene mesh, 10 X 7 cm.      This summer, she underwent a laparoscopic appendectomy by Dr Beal with direct entry into the abdomen without injury at the umbilicus, not in the area of my repair.  The bag in which the appendix was in broke upon removal with contamination.  He then had a wound infection (noted on CT) with need to open.  Cultures were positive for e coli sensitive to levaquin.     Now she presents with an incisional hernia at the umbilicus, at least 5 cm with a 7.6 cm sac, at the umbilicus, outside the area where her prior hernia repair with mesh was.   This is quite notable when standing and has a very dysmorphic appearance to the abdomen.  She has had a CT scan now that shows this defect but also a large amount of small bowel within it, and this sac is pendulous, i.e. thus I think at higher risk to enlarge due to the weight of the small bowel in the sac.  We reviewed the CT scan together to discuss this.    She becomes very tearful and we began to talk about this and says her experience with the wound infection was just almost intolerable.  I am therefore a little bit concerned about proceeding with the surgery for the hernia.  I asked her what her recollection of her prior hernia repair was and she said not much.  I looked at her old note and she is surprised to hear that when she came back to see me she was tearful and said it was much bigger deal and she thought it was going to be.  This likewise makes me concerned about proceeding.    Past Medical History:   Diagnosis Date   • Anxiety    • Arthritis    • Carotid artery stenosis     mild stenosis bilaterally per US    • CKD (chronic kidney disease)     Stage III   • Coronary artery disease    • Depression    • Hyperlipidemia    • Hypertension    • Obesity 2020   • Sinus bradycardia    • Sleep apnea     does not uses CPAP or BiPAP   • Slow to wake up after anesthesia    • Ventral hernia      Past Surgical History:   Procedure Laterality Date   • APPENDECTOMY N/A 2020    Procedure: APPENDECTOMY LAPAROSCOPIC;  Surgeon: Guzman Beal MD;  Location: [facility];  Service: General;  Laterality: N/A;   • CARDIAC CATHETERIZATION N/A     Dr. Araiza; total RCA, occ SVG to RCA, subtotal small mild Cx, patent LIMA to LAD, MYA to OM; EF normal   • CARDIAC SURGERY     • CATARACT EXTRACTION W/ INTRAOCULAR LENS IMPLANT Bilateral     Dr. Carlos   • CHOLECYSTECTOMY N/A 2008    Dr. Karlie Rabago   • COLONOSCOPY N/A 2013   • CORONARY ARTERY BYPASS GRAFT N/A 1997    Quadruple CABG-Dr. Henson   • TONSILLECTOMY Bilateral    • TOTAL HIP ARTHROPLASTY Right 2015    Right anterior approach total hip arthroplasty-Dr. Albert Espino   • TOTAL HIP ARTHROPLASTY Left 2014    Left anterior approach total hip arthroplasty-Dr. Albert Espino   • VENTRAL/INCISIONAL HERNIA REPAIR N/A 2018    Procedure: open recurrent INCISIONAL HERNIA REPAIR with mesh;  Surgeon: Nereida Almodovar MD;  Location: [facility];  Service:      Family History   Problem Relation Age of Onset   • Heart disease Mother    • Hypertension Mother    • Malig Hyperthermia Neg Hx      Social History     Socioeconomic History   • Marital status:      Spouse name: Not on file   • Number of children: 3   • Years of education: Not on file   • Highest education level: Not on file   Occupational History     Employer: RETIRED   Tobacco Use   • Smoking status: Former Smoker     Packs/day: 1.00     Years: 25.00     Pack years: 25.00     Types: Cigarettes     Quit date:      Years since quittin.9   • Smokeless tobacco: Never Used  "  Substance and Sexual Activity   • Alcohol use: No   • Drug use: No   • Sexual activity: Never         Current Outpatient Medications:   •  amLODIPine-benazepril (LOTREL 2.5-10) 2.5-10 MG per capsule, TAKE ONE CAPSULE BY MOUTH EVERY EVENING, Disp: 90 capsule, Rfl: 1  •  aspirin 81 MG chewable tablet, Chew 81 mg Daily., Disp: , Rfl:   •  atorvastatin (LIPITOR) 20 MG tablet, Take 20 mg by mouth Daily., Disp: , Rfl:   •  chlorthalidone (HYGROTON) 25 MG tablet, TAKE ONE TABLET BY MOUTH DAILY, Disp: 90 tablet, Rfl: 2  •  colestipol (COLESTID) 1 g tablet, Take 2 g by mouth., Disp: , Rfl:   •  Cyanocobalamin (VITAMIN B-12 PO), Take 500 mcg by mouth Every Night., Disp: , Rfl:   •  DULoxetine (CYMBALTA) 30 MG capsule, Take 1 capsule by mouth Every Night., Disp: 90 capsule, Rfl: 1  •  isosorbide mononitrate (IMDUR) 30 MG 24 hr tablet, Take 1 tablet by mouth Every Night. (Patient taking differently: Take 60 mg by mouth Every Night.), Disp: 90 tablet, Rfl: 2  •  metFORMIN ER (GLUCOPHAGE-XR) 500 MG 24 hr tablet, Take 2 tablets by mouth Daily With Breakfast., Disp: 180 tablet, Rfl: 1  •  metoprolol succinate XL (TOPROL-XL) 50 MG 24 hr tablet, TAKE ONE TABLET BY MOUTH NIGHTLY, Disp: 90 tablet, Rfl: 1  •  nitroglycerin (NITROSTAT) 0.4 MG SL tablet, Place 1 tablet under the tongue Every 5 (Five) Minutes As Needed for Chest Pain., Disp: 90 tablet, Rfl: 3    No Known Allergies  Review of Systems  Positive for abdominal discomfort  All other systems reviewed and negative.    Vitals:    12/09/20 1427   Weight: 73.9 kg (163 lb)   Height: 165.1 cm (65\")       PHYSICAL EXAM:    Ht 165.1 cm (65\")   Wt 73.9 kg (163 lb)   LMP  (LMP Unknown)   BMI 27.12 kg/m²   Body mass index is 27.12 kg/m².    Constitutional: well developed, well nourished, appears  healthy, stated age or younger in appearance  Eyes: sclera nonicteric, conjunctiva not injected   ENMT: Hearing intact, trachea midline, dentitia not seen with mask in place  CVS: RRR, no " "murmur, peripheral edema not present  Respiratory: CTA, normal respiratory effort   Gastrointestinal: no hepatosplenomegaly, abdomen soft, very dysmorphic with large protrusion to the left of the umbilicus about 8 cm in diameter, abdominal hernia not present at prior incisional hernia repair though she does have some patulous skin at the site  Genitourinary: inguinal hernia not present  Musculoskeletal: gait normal, muscle mass normal  Skin: warm and dry, no rashes visible  Neurological: awake and alert, seems to have reasonable capacity for understanding for medical decision making  Psychiatric: appears to have reasonable judgement, though she is quite tearful and based on her recollection of her surgery this summer and my prior surgery, I think she is somewhat fragile.  She mentions living alone in Cov[place] and \"how can anyone live like this\"  Lymphatics: no cervical adenopathy    Radiographic/Lab Findings: As discussed    Pamphlet reviewed: Hernia    IMPRESSION:  · Incisional hernia after appendectomy with postoperative infection, containing of moderate amount of small bowel.  This is sizable and I believe will only get larger  · History of incisional hernia at mediastinal chest tube sites, status post repair with mesh with good results  · Emotional fragile state with probably some isolation stress from Covid as well as concerned about the need to ask for help if she has another surgery.  · Diabetes on metformin  · Depression on duloxetine  · Coronary artery disease on isosorbide  · Hypertension on amlodipine-benazepril, metoprolol    PLAN:  · Follow-up after the first of the year.  She is going to think about how much this is bothering her and if she is really willing to go through the surgery.  We did discuss the method that I would approach this which would be a laparoscopic repair.  I have chosen this because of her age, anticipating that recurrent surgery will be unlikely, as she will need a fairly large piece " of mesh and it will be intraperitoneal with risk of adhesion of the bowel, but will afford a much better repair which I think is key in her situation since we already know she is at high risk for herniation with any incision.  Would have to be alert to the potential for transfascial suture pain as she seems fairly sensitive, and would likely use Vicryl.    Nereida Almodovar MD  12/09/2020  5:44 PM

## 2020-12-09 ENCOUNTER — OFFICE VISIT (OUTPATIENT)
Dept: SURGERY | Facility: CLINIC | Age: 80
End: 2020-12-09

## 2020-12-09 VITALS — HEIGHT: 65 IN | WEIGHT: 163 LBS | BODY MASS INDEX: 27.16 KG/M2

## 2020-12-09 DIAGNOSIS — K43.2 INCISIONAL HERNIA, WITHOUT OBSTRUCTION OR GANGRENE: Primary | ICD-10-CM

## 2020-12-09 PROCEDURE — 99214 OFFICE O/P EST MOD 30 MIN: CPT | Performed by: SURGERY

## 2020-12-09 RX ORDER — ASPIRIN 81 MG/1
81 TABLET, CHEWABLE ORAL DAILY
COMMUNITY

## 2020-12-16 RX ORDER — DULOXETIN HYDROCHLORIDE 30 MG/1
CAPSULE, DELAYED RELEASE ORAL
Qty: 90 CAPSULE | Refills: 1 | Status: SHIPPED | OUTPATIENT
Start: 2020-12-16 | End: 2021-06-15

## 2020-12-21 DIAGNOSIS — E11.9 NON-INSULIN DEPENDENT TYPE 2 DIABETES MELLITUS (HCC): ICD-10-CM

## 2020-12-21 RX ORDER — METFORMIN HYDROCHLORIDE 500 MG/1
TABLET, EXTENDED RELEASE ORAL
Qty: 180 TABLET | Refills: 1 | Status: SHIPPED | OUTPATIENT
Start: 2020-12-21 | End: 2021-07-19

## 2021-01-08 ENCOUNTER — TRANSCRIBE ORDERS (OUTPATIENT)
Dept: ADMINISTRATIVE | Facility: HOSPITAL | Age: 81
End: 2021-01-08

## 2021-01-08 DIAGNOSIS — Z12.31 VISIT FOR SCREENING MAMMOGRAM: Primary | ICD-10-CM

## 2021-02-08 RX ORDER — CHLORTHALIDONE 25 MG/1
TABLET ORAL
Qty: 90 TABLET | Refills: 2 | Status: SHIPPED | OUTPATIENT
Start: 2021-02-08 | End: 2021-10-29

## 2021-02-26 ENCOUNTER — TELEPHONE (OUTPATIENT)
Dept: INTERNAL MEDICINE | Facility: CLINIC | Age: 81
End: 2021-02-26

## 2021-03-01 ENCOUNTER — OFFICE VISIT (OUTPATIENT)
Dept: INTERNAL MEDICINE | Facility: CLINIC | Age: 81
End: 2021-03-01

## 2021-03-01 VITALS
HEART RATE: 83 BPM | DIASTOLIC BLOOD PRESSURE: 74 MMHG | TEMPERATURE: 97.3 F | HEIGHT: 65 IN | SYSTOLIC BLOOD PRESSURE: 115 MMHG | OXYGEN SATURATION: 96 % | WEIGHT: 161 LBS | RESPIRATION RATE: 17 BRPM | BODY MASS INDEX: 26.82 KG/M2

## 2021-03-01 DIAGNOSIS — L01.00 IMPETIGO: Primary | ICD-10-CM

## 2021-03-01 PROCEDURE — 99213 OFFICE O/P EST LOW 20 MIN: CPT | Performed by: NURSE PRACTITIONER

## 2021-03-01 RX ORDER — CEPHALEXIN 500 MG/1
500 CAPSULE ORAL 3 TIMES DAILY
COMMUNITY
Start: 2021-02-26 | End: 2021-03-08

## 2021-03-01 NOTE — PROGRESS NOTES
"Chief Complaint  Impetigo (Pt presents here today with impetigo on the abdomen.)    Subjective          Navi Kinney presents to [facility]  Patient presents for follow-up of abdominal skin infection.  This is a 80-year-old female.  She had a surgical laparoscopic incisional hernia repair with Dr. Molina at [facility] on 2/15/2021.  Since then, surrounding her left side she has developed rash/drainage.  She went to an urgent care center on 2/26/2021, was diagnosed with impetigo and is now on Keflex 500 mg 3 times daily x10 days along with mupirocin ointment.  She has seen an improvement in the areas since taking the antibiotics.  No fevers, chills and the crusting that was present throughout the abdomen before has mostly subsided.  No spread of the redness and in fact this is improving.  She denies development of any other new issues today.      Review of Systems   Skin: Positive for rash and bruise.   All other systems reviewed and are negative.    Objective   Vital Signs:   /74 (BP Location: Left arm, Patient Position: Sitting, Cuff Size: Adult)   Pulse 83   Temp 97.3 °F (36.3 °C)   Resp 17   Ht 165.1 cm (65\")   Wt 73 kg (161 lb)   SpO2 96%   BMI 26.79 kg/m²     Physical Exam  Vitals signs and nursing note reviewed.   Constitutional:       General: She is not in acute distress.     Appearance: Normal appearance. She is well-developed. She is not ill-appearing, toxic-appearing or diaphoretic.   HENT:      Head: Atraumatic.   Eyes:      Pupils: Pupils are equal, round, and reactive to light.   Neck:      Musculoskeletal: Normal range of motion and neck supple.   Cardiovascular:      Rate and Rhythm: Normal rate and regular rhythm.      Pulses: Normal pulses.      Heart sounds: Normal heart sounds.   Pulmonary:      Effort: Pulmonary effort is normal. No respiratory distress.      Breath sounds: Normal breath sounds. No stridor. No wheezing, rhonchi or rales.   Chest: "      Chest wall: No tenderness.   Abdominal:      General: Bowel sounds are normal. There is no distension.      Palpations: Abdomen is soft.      Tenderness: There is no abdominal tenderness.   Musculoskeletal: Normal range of motion.   Skin:     General: Skin is warm and dry.      Capillary Refill: Capillary refill takes less than 2 seconds.      Comments: Surgical lap sites throughout abdomen, redness surrounding each by 1/2 inch.  No drainage.   Neurological:      General: No focal deficit present.      Mental Status: She is alert and oriented to person, place, and time. Mental status is at baseline.   Psychiatric:         Mood and Affect: Mood normal.         Behavior: Behavior normal.         Thought Content: Thought content normal.         Judgment: Judgment normal.        Result Review :   The following data was reviewed by: CORNELL Fong on 03/01/2021:  Common labs    Common Labsle 2/5/21 2/12/21 2/15/21   Potassium   4.2   WBC 9.42     Hemoglobin 12.7     Hematocrit 38.2     Platelets 262     Hemoglobin A1C  6.9 (A)    (A) Abnormal value       Comments are available for some flowsheets but are not being displayed.           Current outpatient and discharge medications have been reconciled for the patient.  Reviewed by: CORNELL Fong    Reviewed 2/15/2021 surgery with Dr. Molina, upper scopic incisional hernia repair with [place].  Reviewed 2/26/2021 urgent care center visit.         Assessment and Plan    Diagnoses and all orders for this visit:    1. Impetigo (Primary)    Improving, continue with the full course of Keflex 500 mg 3 times daily x10 days along with mupirocin ointment applied topically 3 times daily.  She has an appointment with the general surgeon in 3 days for recheck and I see her later this week for her Medicare wellness visit at which time I will recheck the wounds as well.    Follow-up as needed in the meantime.      Follow Up   No follow-ups on file.  Patient was given  instructions and counseling regarding her condition or for health maintenance advice. Please see specific information pulled into the AVS if appropriate.

## 2021-03-05 ENCOUNTER — OFFICE VISIT (OUTPATIENT)
Dept: INTERNAL MEDICINE | Facility: CLINIC | Age: 81
End: 2021-03-05

## 2021-03-05 VITALS
HEIGHT: 65 IN | OXYGEN SATURATION: 95 % | DIASTOLIC BLOOD PRESSURE: 69 MMHG | HEART RATE: 80 BPM | BODY MASS INDEX: 26.66 KG/M2 | WEIGHT: 160 LBS | TEMPERATURE: 97.9 F | RESPIRATION RATE: 20 BRPM | SYSTOLIC BLOOD PRESSURE: 104 MMHG

## 2021-03-05 DIAGNOSIS — Z78.0 POSTMENOPAUSAL: ICD-10-CM

## 2021-03-05 DIAGNOSIS — T81.49XA SURGICAL SITE INFECTION: ICD-10-CM

## 2021-03-05 DIAGNOSIS — Z00.00 HEALTHCARE MAINTENANCE: ICD-10-CM

## 2021-03-05 DIAGNOSIS — F32.89 OTHER DEPRESSION: ICD-10-CM

## 2021-03-05 DIAGNOSIS — I10 ESSENTIAL HYPERTENSION: ICD-10-CM

## 2021-03-05 DIAGNOSIS — Z00.00 MEDICARE ANNUAL WELLNESS VISIT, SUBSEQUENT: Primary | ICD-10-CM

## 2021-03-05 DIAGNOSIS — E11.9 NON-INSULIN DEPENDENT TYPE 2 DIABETES MELLITUS (HCC): ICD-10-CM

## 2021-03-05 DIAGNOSIS — E78.2 MIXED HYPERLIPIDEMIA: ICD-10-CM

## 2021-03-05 PROCEDURE — G0439 PPPS, SUBSEQ VISIT: HCPCS | Performed by: NURSE PRACTITIONER

## 2021-03-05 PROCEDURE — 1170F FXNL STATUS ASSESSED: CPT | Performed by: NURSE PRACTITIONER

## 2021-03-05 PROCEDURE — 1159F MED LIST DOCD IN RCRD: CPT | Performed by: NURSE PRACTITIONER

## 2021-03-05 PROCEDURE — 96160 PT-FOCUSED HLTH RISK ASSMT: CPT | Performed by: NURSE PRACTITIONER

## 2021-03-05 PROCEDURE — 99214 OFFICE O/P EST MOD 30 MIN: CPT | Performed by: NURSE PRACTITIONER

## 2021-03-05 PROCEDURE — 1125F AMNT PAIN NOTED PAIN PRSNT: CPT | Performed by: NURSE PRACTITIONER

## 2021-03-05 RX ORDER — ISOSORBIDE MONONITRATE 60 MG/1
60 TABLET, EXTENDED RELEASE ORAL DAILY
COMMUNITY
End: 2022-07-27

## 2021-03-05 RX ORDER — METOPROLOL TARTRATE 50 MG/1
TABLET, FILM COATED ORAL
COMMUNITY
End: 2021-03-05 | Stop reason: SDUPTHER

## 2021-03-05 NOTE — PATIENT INSTRUCTIONS
Medicare Wellness  Personal Prevention Plan of Service     Date of Office Visit:  2021  Encounter Provider:  CORNELL Barnett  Place of Service:  [facility]  Patient Name: Navi Kinney  :  1940    As part of the Medicare Wellness portion of your visit today, we are providing you with this personalized preventive plan of services (PPPS). This plan is based upon recommendations of the [place] Preventive Services Task Force (USPSTF) and the Advisory Committee on Immunization Practices (ACIP).    This lists the preventive care services that should be considered, and provides dates of when you are due. Items listed as completed are up-to-date and do not require any further intervention.    Health Maintenance   Topic Date Due   • DXA SCAN  1940   • ZOSTER VACCINE (1 of 2) 1991   • URINE MICROALBUMIN  2020   • DIABETIC EYE EXAM  2021 (Originally 10/23/2020)   • COVID-19 Vaccine (2 of 2 - Pfizer series) 2021   • HEMOGLOBIN A1C  2021   • MAMMOGRAM  01/10/2022   • LIPID PANEL  2022   • ANNUAL WELLNESS VISIT  2022   • COLONOSCOPY  2024   • TDAP/TD VACCINES (2 - Td) 2027   • HEPATITIS C SCREENING  Completed   • INFLUENZA VACCINE  Completed   • Pneumococcal Vaccine 65+  Completed   • MENINGOCOCCAL VACCINE  Aged Out       Orders Placed This Encounter   Procedures   • DEXA Bone Density Axial     Standing Status:   Future     Standing Expiration Date:   3/5/2022     Scheduling Instructions:      She has a mammogram scheduled 21, can we get this DEXA scheduled at the same time at [facility]?     Order Specific Question:   Is patient taking or have taken long term Glucocorticoid (steroids)?     Answer:   No     Order Specific Question:   Does the patient have rheumatoid arthritis?     Answer:   No     Order Specific Question:   Reason for Exam:     Answer:   DEXA due   • CBC No Differential   • Comprehensive metabolic  panel   • TSH Rfx On Abnormal To Free T4   • MicroAlbumin, Urine, Random - Urine, Clean Catch   • Ambulatory Referral to Ophthalmology     Referral Priority:   Routine     Referral Type:   Consultation     Referral Reason:   Specialty Services Required     Referred to Provider:   Robert Marshall MD     Requested Specialty:   Ophthalmology     Number of Visits Requested:   1       Return in about 6 months (around 9/5/2021).

## 2021-03-05 NOTE — ASSESSMENT & PLAN NOTE
Hypertension is stable, continue routine home monitoring for goal of less than 130/80.  Continue regimen of Imdur, Toprol-XL, amlodipine-benazepril and chlorthalidone.

## 2021-03-05 NOTE — ASSESSMENT & PLAN NOTE
Continue atorvastatin 20 mg daily, lipid panel stable.  She will decrease intake of fats and processed foods.

## 2021-03-05 NOTE — PROGRESS NOTES
The ABCs of the Annual Wellness Visit  Subsequent Medicare Wellness Visit    Chief Complaint   Patient presents with   • Medicare Wellness-subsequent     Pt presents here today for a medicare wellness visit.       Subjective   History of Present Illness:  Navi Kinney is a 80 y.o. female who presents for a Subsequent Medicare Wellness Visit and follow-up on chronic conditions.  This is a 80-year-old female.    She has type 2 diabetes, well controlled with an A1c of 6.9 last checked on 2/12/2021.  She takes Metformin XR 1000 mg daily reporting good compliance.    She has hypertension and takes amlodipine-benazepril 2.5-10 mg daily, chlorthalidone 25 mg daily, Imdur 30 mg daily and Toprol-XL 50 mg daily.  Reports good compliance with this regimen and good control of blood pressures.    She has hyperlipidemia and takes atorvastatin 20 mg daily reporting good compliance with this medication.  Recent lipid panel was stable with the exception of slightly high triglycerides.    She has depression and takes Cymbalta 30 mg daily.  No SI or HI and this medication works well for her.    On 2/15/2020 when she had a laparoscopic incisional hernia repair with Dr. Moilna at [facility].  She developed a surgical site infection around her lap sites and is on a course of Keflex.  Symptoms have improved greatly including the crusting and redness surrounding the incision sites.  She is having no drainage, no fevers and overall feeling much better.    She is due for diabetic eye exam and needs a referral to a new ophthalmologist.    She has her screening mammogram scheduled on 4/19/2021.  She is due for DEXA scan as well.    Denies development of any other new issues today.    HEALTH RISK ASSESSMENT    Recent Hospitalizations:  Recently treated at the following:  [facility]    Current Medical Providers:  Patient Care Team:  Lulu Skelton APRN as PCP - General (Nurse Practitioner)  Nereida Henson MD as  Consulting Physician (Cardiology)  Negro Gusman MD as Consulting Physician (Nephrology)  Yoni Craig MD as Consulting Physician (Dermatology)    Smoking Status:  Social History     Tobacco Use   Smoking Status Former Smoker   • Packs/day: 1.00   • Years: 25.00   • Pack years: 25.00   • Types: Cigarettes   • Quit date:    • Years since quittin.1   Smokeless Tobacco Never Used       Alcohol Consumption:  Social History     Substance and Sexual Activity   Alcohol Use No       Depression Screen:   PHQ-2/PHQ-9 Depression Screening 3/5/2021   Little interest or pleasure in doing things 0   Feeling down, depressed, or hopeless 0   Trouble falling or staying asleep, or sleeping too much 0   Feeling tired or having little energy 0   Poor appetite or overeating 0   Feeling bad about yourself - or that you are a failure or have let yourself or your family down 0   Trouble concentrating on things, such as reading the newspaper or watching television 0   Moving or speaking so slowly that other people could have noticed. Or the opposite - being so fidgety or restless that you have been moving around a lot more than usual 0   Thoughts that you would be better off dead, or of hurting yourself in some way 0   Total Score 0   If you checked off any problems, how difficult have these problems made it for you to do your work, take care of things at home, or get along with other people? Not difficult at all       Fall Risk Screen:  STEADI Fall Risk Assessment was completed, and patient is at LOW risk for falls.Assessment completed on:3/5/2021    Health Habits and Functional and Cognitive Screening:  Functional & Cognitive Status 3/5/2021   Do you have difficulty preparing food and eating? No   Do you have difficulty bathing yourself, getting dressed or grooming yourself? No   Do you have difficulty using the toilet? No   Do you have difficulty moving around from place to place? No   Do you have trouble  with steps or getting out of a bed or a chair? No   Current Diet Well Balanced Diet   Dental Exam Up to date   Eye Exam Up to date   Exercise (times per week) 0 times per week   Current Exercise Activities Include None   Do you need help using the phone?  No   Are you deaf or do you have serious difficulty hearing?  Yes   Do you need help with transportation? No   Do you need help shopping? No   Do you need help preparing meals?  No   Do you need help with housework?  No   Do you need help with laundry? No   Do you need help taking your medications? No   Do you need help managing money? No   Do you ever drive or ride in a car without wearing a seat belt? No   Have you felt unusual stress, anger or loneliness in the last month? No   Who do you live with? Alone   If you need help, do you have trouble finding someone available to you? No   Have you been bothered in the last four weeks by sexual problems? No   Do you have difficulty concentrating, remembering or making decisions? Yes         Does the patient have evidence of cognitive impairment? No    Asprin use counseling:Taking ASA appropriately as indicated    Age-appropriate Screening Schedule:  Refer to the list below for future screening recommendations based on patient's age, sex and/or medical conditions. Orders for these recommended tests are listed in the plan section. The patient has been provided with a written plan.    Health Maintenance   Topic Date Due   • DXA SCAN  1940   • ZOSTER VACCINE (1 of 2) 12/27/1991   • URINE MICROALBUMIN  11/08/2020   • DIABETIC EYE EXAM  04/23/2021 (Originally 10/23/2020)   • HEMOGLOBIN A1C  08/12/2021   • MAMMOGRAM  01/10/2022   • LIPID PANEL  02/12/2022   • COLONOSCOPY  01/08/2024   • TDAP/TD VACCINES (2 - Td) 05/04/2027   • INFLUENZA VACCINE  Completed          The following portions of the patient's history were reviewed and updated as appropriate: allergies, current medications, past family history, past medical  history, past social history, past surgical history and problem list.    Outpatient Medications Prior to Visit   Medication Sig Dispense Refill   • amLODIPine-benazepril (LOTREL 2.5-10) 2.5-10 MG per capsule TAKE ONE CAPSULE BY MOUTH EVERY EVENING 90 capsule 1   • aspirin 81 MG chewable tablet Chew 81 mg Daily.     • atorvastatin (LIPITOR) 20 MG tablet Take 20 mg by mouth Daily.     • cephalexin (KEFLEX) 500 MG capsule Take 500 mg by mouth 3 (Three) Times a Day.     • chlorthalidone (HYGROTON) 25 MG tablet TAKE ONE TABLET BY MOUTH DAILY 90 tablet 2   • colestipol (COLESTID) 1 g tablet Take 2 g by mouth.     • Cyanocobalamin (VITAMIN B-12 PO) Take 500 mcg by mouth Every Night.     • DULoxetine (CYMBALTA) 30 MG capsule TAKE ONE CAPSULE BY MOUTH EVERY EVENING 90 capsule 1   • isosorbide mononitrate (IMDUR) 60 MG 24 hr tablet Take 60 mg by mouth Daily.     • metFORMIN ER (GLUCOPHAGE-XR) 500 MG 24 hr tablet TAKE TWO TABLETS BY MOUTH EVERY DAY WITH BREAKFAST 180 tablet 1   • metoprolol succinate XL (TOPROL-XL) 50 MG 24 hr tablet TAKE ONE TABLET BY MOUTH NIGHTLY 90 tablet 1   • mupirocin (BACTROBAN) 2 % ointment Apply to affected area 3 times daily.     • nitroglycerin (NITROSTAT) 0.4 MG SL tablet Place 1 tablet under the tongue Every 5 (Five) Minutes As Needed for Chest Pain. 90 tablet 3   • isosorbide mononitrate (IMDUR) 30 MG 24 hr tablet Take 1 tablet by mouth Every Night. (Patient taking differently: Take 60 mg by mouth Every Night.) 90 tablet 2   • metoprolol tartrate (LOPRESSOR) 50 MG tablet Take  by mouth.       No facility-administered medications prior to visit.        Patient Active Problem List   Diagnosis   • Acute upper respiratory infection   • Ankle pain   • Arthralgia of hip   • Atherosclerosis of coronary artery   • Chronic kidney disease, stage III (moderate) (CMS/HCC)   • Depression   • Non-insulin dependent type 2 diabetes mellitus (CMS/HCC)   • Dyspnea on exertion   • Foot pain   • Hypertension   •  "Hyperlipidemia   • Degeneration of intervertebral disc of lumbar region   • Lumbar radiculopathy   • Spinal stenosis of lumbar region   • Osteoarthritis of hip   • Palpitations   • Seborrheic keratosis   • Visit for screening mammogram   • Torus palatinus   • Medicare annual wellness visit, subsequent   • Osteoarthritis of knee   • Acute pain of both shoulders/ since 7/2/18   • Right elbow pain   • Carotid artery stenosis   • Obesity   • Obstructive sleep apnea   • S/P CABG (coronary artery bypass graft)   • Sinus bradycardia       Advanced Care Planning:  ACP discussion was held with the patient during this visit. Patient has an advance directive in EMR which is still valid.     Review of Systems   All other systems reviewed and are negative.      Compared to one year ago, the patient feels her physical health is better.  Compared to one year ago, the patient feels her mental health is the same.    Reviewed chart for potential of high risk medication in the elderly: yes  Reviewed chart for potential of harmful drug interactions in the elderly:yes    Objective         Vitals:    03/05/21 1050   BP: 104/69   BP Location: Right arm   Patient Position: Sitting   Cuff Size: Adult   Pulse: 80   Resp: 20   Temp: 97.9 °F (36.6 °C)   SpO2: 95%   Weight: 72.6 kg (160 lb)   Height: 165.1 cm (65\")       Body mass index is 26.63 kg/m².  Discussed the patient's BMI with her. The BMI is above average; no BMI management plan is appropriate..    Physical Exam  Vitals signs and nursing note reviewed.   Constitutional:       General: She is not in acute distress.     Appearance: Normal appearance. She is well-developed. She is not ill-appearing, toxic-appearing or diaphoretic.   HENT:      Head: Normocephalic and atraumatic.      Right Ear: Tympanic membrane, ear canal and external ear normal.      Left Ear: Tympanic membrane, ear canal and external ear normal.   Eyes:      Pupils: Pupils are equal, round, and reactive to light. "   Neck:      Musculoskeletal: Normal range of motion and neck supple. No neck rigidity or muscular tenderness.      Vascular: No carotid bruit.   Cardiovascular:      Rate and Rhythm: Normal rate and regular rhythm.      Pulses: Normal pulses.      Heart sounds: Normal heart sounds.      Comments: No peripheral edema  Pulmonary:      Effort: Pulmonary effort is normal. No respiratory distress.      Breath sounds: Normal breath sounds. No stridor. No wheezing, rhonchi or rales.   Chest:      Chest wall: No tenderness.   Abdominal:      General: Bowel sounds are normal. There is no distension.      Palpations: Abdomen is soft. There is no mass.      Tenderness: There is no abdominal tenderness. There is no right CVA tenderness, left CVA tenderness, guarding or rebound.      Hernia: No hernia is present.   Musculoskeletal: Normal range of motion.   Lymphadenopathy:      Cervical: No cervical adenopathy.   Skin:     General: Skin is warm and dry.      Capillary Refill: Capillary refill takes less than 2 seconds.      Comments: Mild surrounding erythema of multiple abdominal laparoscopic surgical incision sites, no drainage or crusting, improved   Neurological:      General: No focal deficit present.      Mental Status: She is alert and oriented to person, place, and time. Mental status is at baseline.   Psychiatric:         Mood and Affect: Mood normal.         Behavior: Behavior normal.         Thought Content: Thought content normal.         Judgment: Judgment normal.         Lab Results   Component Value Date    HGBA1C 6.9 (H) 02/12/2021        Assessment/Plan   Medicare Risks and Personalized Health Plan  CMS Preventative Services Quick Reference  Advance Directive Discussion  Cardiovascular risk  Chronic Pain   Colon Cancer Screening  Dementia/Memory   Depression/Dysphoria  Fall Risk  Immunizations Discussed/Encouraged (specific immunizations; Influenza )  Inactivity/Sedentary  Osteoprorosis  Risk  Polypharmacy    Current outpatient and discharge medications have been reconciled for the patient.  Reviewed by: CORNELL Barnett      The above risks/problems have been discussed with the patient.  Pertinent information has been shared with the patient in the After Visit Summary.  Follow up plans and orders are seen below in the Assessment/Plan Section.    Diagnoses and all orders for this visit:    1. Medicare annual wellness visit, subsequent (Primary)    2. Healthcare maintenance  Comments:  DEXA and mammograms are due, ordered and will schedule.  Due for diabetic eye exam, referred to ophthalmology.    3. Mixed hyperlipidemia  Assessment & Plan:  Continue atorvastatin 20 mg daily, lipid panel stable.  She will decrease intake of fats and processed foods.      4. Essential hypertension  Assessment & Plan:  Hypertension is stable, continue routine home monitoring for goal of less than 130/80.  Continue regimen of Imdur, Toprol-XL, amlodipine-benazepril and chlorthalidone.    Orders:  -     CBC No Differential  -     Comprehensive metabolic panel  -     TSH Rfx On Abnormal To Free T4    5. Non-insulin dependent type 2 diabetes mellitus (CMS/[facility])  Assessment & Plan:  Diabetes is stable, recent A1c 6.9.  Goal is to keep the A1c less than 7.  Continue with healthy diet choices low in concentrated sweets and Metformin XR 1000 mg daily.    Orders:  -     MicroAlbumin, Urine, Random - Urine, Clean Catch  -     Ambulatory Referral to Ophthalmology    6. Other depression  Assessment & Plan:  Patient's depression is well controlled, continue Cymbalta 30 mg daily.      7. Surgical site infection  Comments:  Much improved, continue and complete course of Keflex as well as mupirocin.  Orders:  -     mupirocin (BACTROBAN) 2 % ointment; Apply 1 application topically to the appropriate area as directed 3 (Three) Times a Day. APPLY TO AFFECTED AREA  Dispense: 15 g; Refill: 0    8. Postmenopausal  Comments:  DEXA scan  due and ordered.  Orders:  -     DEXA Bone Density Axial; Future    Other orders  -     Cancel: Lipid panel    Follow Up:  Return in about 6 months (around 9/5/2021).     We will contact patient with lab results and any further recommendations.  Follow-up as needed and I will see her back in 6 months for recheck of chronic conditions/routine health maintenance.    An After Visit Summary and PPPS were given to the patient.

## 2021-03-05 NOTE — ASSESSMENT & PLAN NOTE
Diabetes is stable, recent A1c 6.9.  Goal is to keep the A1c less than 7.  Continue with healthy diet choices low in concentrated sweets and Metformin XR 1000 mg daily.

## 2021-03-06 LAB
ALBUMIN SERPL-MCNC: 4.1 G/DL (ref 3.5–5.2)
ALBUMIN/GLOB SERPL: 1.8 G/DL
ALP SERPL-CCNC: 99 U/L (ref 39–117)
ALT SERPL-CCNC: 16 U/L (ref 1–33)
AST SERPL-CCNC: 18 U/L (ref 1–32)
BILIRUB SERPL-MCNC: 0.5 MG/DL (ref 0–1.2)
BUN SERPL-MCNC: 22 MG/DL (ref 8–23)
BUN/CREAT SERPL: 21.2 (ref 7–25)
CALCIUM SERPL-MCNC: 9.6 MG/DL (ref 8.6–10.5)
CHLORIDE SERPL-SCNC: 100 MMOL/L (ref 98–107)
CO2 SERPL-SCNC: 26 MMOL/L (ref 22–29)
CREAT SERPL-MCNC: 1.04 MG/DL (ref 0.57–1)
ERYTHROCYTE [DISTWIDTH] IN BLOOD BY AUTOMATED COUNT: 12.1 % (ref 12.3–15.4)
GLOBULIN SER CALC-MCNC: 2.3 GM/DL
GLUCOSE SERPL-MCNC: 124 MG/DL (ref 65–99)
HCT VFR BLD AUTO: 37.9 % (ref 34–46.6)
HGB BLD-MCNC: 12.6 G/DL (ref 12–15.9)
MCH RBC QN AUTO: 30 PG (ref 26.6–33)
MCHC RBC AUTO-ENTMCNC: 33.2 G/DL (ref 31.5–35.7)
MCV RBC AUTO: 90.2 FL (ref 79–97)
PLATELET # BLD AUTO: 365 10*3/MM3 (ref 140–450)
POTASSIUM SERPL-SCNC: 4.1 MMOL/L (ref 3.5–5.2)
PROT SERPL-MCNC: 6.4 G/DL (ref 6–8.5)
RBC # BLD AUTO: 4.2 10*6/MM3 (ref 3.77–5.28)
SODIUM SERPL-SCNC: 139 MMOL/L (ref 136–145)
TSH SERPL DL<=0.005 MIU/L-ACNC: 1.3 UIU/ML (ref 0.27–4.2)
UNABLE TO VOID: NORMAL
WBC # BLD AUTO: 9.86 10*3/MM3 (ref 3.4–10.8)

## 2021-03-08 ENCOUNTER — TELEPHONE (OUTPATIENT)
Dept: INTERNAL MEDICINE | Facility: CLINIC | Age: 81
End: 2021-03-08

## 2021-03-08 NOTE — TELEPHONE ENCOUNTER
----- Message from CORNELL Barnett sent at 3/8/2021  1:36 PM EST -----  Please call pt-blood count is normal with normal white blood cells and no anemia.  Metabolic panel is stable including electrolytes, liver enzymes.  Kidney function very mildly decreased, likely related to fasting for labs, please be sure to take in   adequate water during the day and avoid any excessive NSAIDs.  Thyroid numbers are normal.

## 2021-03-08 NOTE — PROGRESS NOTES
Please call pt-blood count is normal with normal white blood cells and no anemia.  Metabolic panel is stable including electrolytes, liver enzymes.  Kidney function very mildly decreased, likely related to fasting for labs, please be sure to take in adequate water during the day and avoid any excessive NSAIDs.  Thyroid numbers are normal.

## 2021-03-10 LAB — MICROALBUMIN UR-MCNC: 39.4 UG/ML

## 2021-03-24 DIAGNOSIS — I10 ESSENTIAL HYPERTENSION: Primary | ICD-10-CM

## 2021-03-24 RX ORDER — AMLODIPINE BESYLATE AND BENAZEPRIL HYDROCHLORIDE 2.5; 1 MG/1; MG/1
CAPSULE ORAL
Qty: 90 CAPSULE | Refills: 0 | Status: SHIPPED | OUTPATIENT
Start: 2021-03-24 | End: 2021-08-23

## 2021-04-19 ENCOUNTER — HOSPITAL ENCOUNTER (OUTPATIENT)
Dept: MAMMOGRAPHY | Facility: HOSPITAL | Age: 81
Discharge: HOME | End: 2021-04-19
Admitting: NURSE PRACTITIONER

## 2021-04-19 DIAGNOSIS — Z12.31 VISIT FOR SCREENING MAMMOGRAM: ICD-10-CM

## 2021-04-19 PROCEDURE — 77063 BREAST TOMOSYNTHESIS BI: CPT

## 2021-04-19 PROCEDURE — 77067 SCR MAMMO BI INCL CAD: CPT

## 2021-04-22 DIAGNOSIS — R92.8 ABNORMAL MAMMOGRAM OF RIGHT BREAST: Primary | ICD-10-CM

## 2021-04-22 NOTE — PROGRESS NOTES
Please call pt- screening mammogram came back showing normal left breast. Right breast showing a spot of asymmetry behind the nipple, which needs further evaluation with follow up mammogram and ultrasound. I have ordered these tests, please schedule soon when contacted.

## 2021-04-29 ENCOUNTER — HOSPITAL ENCOUNTER (OUTPATIENT)
Dept: MAMMOGRAPHY | Facility: HOSPITAL | Age: 81
Discharge: HOME | End: 2021-04-29

## 2021-04-29 ENCOUNTER — HOSPITAL ENCOUNTER (OUTPATIENT)
Dept: ULTRASOUND IMAGING | Facility: HOSPITAL | Age: 81
Discharge: HOME | End: 2021-04-29

## 2021-04-29 DIAGNOSIS — R92.8 ABNORMAL MAMMOGRAM OF RIGHT BREAST: ICD-10-CM

## 2021-04-29 PROCEDURE — 76642 ULTRASOUND BREAST LIMITED: CPT

## 2021-04-29 PROCEDURE — 77065 DX MAMMO INCL CAD UNI: CPT

## 2021-04-29 PROCEDURE — G0279 TOMOSYNTHESIS, MAMMO: HCPCS

## 2021-04-29 NOTE — PROGRESS NOTES
Please call pt-her repeat mammogram shows no evidence of malignancy in the right breast.  Stable.  Recommend routine annual screening mammogram in 1 year.

## 2021-05-04 ENCOUNTER — TELEPHONE (OUTPATIENT)
Dept: INTERNAL MEDICINE | Facility: CLINIC | Age: 81
End: 2021-05-04

## 2021-05-04 NOTE — TELEPHONE ENCOUNTER
----- Message from CORNELL Barnett sent at 4/29/2021  3:25 PM EDT -----  Please call pt-her repeat mammogram shows no evidence of malignancy in the right breast.  Stable.  Recommend routine annual screening mammogram in 1 year.

## 2021-06-01 RX ORDER — METOPROLOL SUCCINATE 50 MG/1
TABLET, EXTENDED RELEASE ORAL
Qty: 90 TABLET | Refills: 1 | Status: SHIPPED | OUTPATIENT
Start: 2021-06-01 | End: 2022-02-08

## 2021-06-15 RX ORDER — DULOXETIN HYDROCHLORIDE 30 MG/1
CAPSULE, DELAYED RELEASE ORAL
Qty: 90 CAPSULE | Refills: 1 | Status: SHIPPED | OUTPATIENT
Start: 2021-06-15 | End: 2021-12-14

## 2021-06-18 ENCOUNTER — HOSPITAL ENCOUNTER (OUTPATIENT)
Dept: BONE DENSITY | Facility: HOSPITAL | Age: 81
Discharge: HOME | End: 2021-06-18
Admitting: NURSE PRACTITIONER

## 2021-06-18 DIAGNOSIS — Z78.0 POSTMENOPAUSAL: ICD-10-CM

## 2021-06-18 PROCEDURE — 77080 DXA BONE DENSITY AXIAL: CPT

## 2021-07-19 DIAGNOSIS — E11.9 NON-INSULIN DEPENDENT TYPE 2 DIABETES MELLITUS (HCC): ICD-10-CM

## 2021-07-19 RX ORDER — METFORMIN HYDROCHLORIDE 500 MG/1
TABLET, EXTENDED RELEASE ORAL
Qty: 90 TABLET | Refills: 1 | Status: SHIPPED | OUTPATIENT
Start: 2021-07-19 | End: 2022-01-03

## 2021-08-23 DIAGNOSIS — I10 ESSENTIAL HYPERTENSION: ICD-10-CM

## 2021-08-23 RX ORDER — AMLODIPINE BESYLATE AND BENAZEPRIL HYDROCHLORIDE 2.5; 1 MG/1; MG/1
CAPSULE ORAL
Qty: 90 CAPSULE | Refills: 0 | Status: SHIPPED | OUTPATIENT
Start: 2021-08-23 | End: 2021-11-16

## 2021-08-30 ENCOUNTER — OFFICE VISIT (OUTPATIENT)
Dept: INTERNAL MEDICINE | Facility: CLINIC | Age: 81
End: 2021-08-30

## 2021-08-30 ENCOUNTER — APPOINTMENT (OUTPATIENT)
Dept: WOMENS IMAGING | Facility: HOSPITAL | Age: 81
End: 2021-08-30

## 2021-08-30 VITALS
HEART RATE: 71 BPM | OXYGEN SATURATION: 96 % | WEIGHT: 164 LBS | TEMPERATURE: 98.2 F | RESPIRATION RATE: 18 BRPM | SYSTOLIC BLOOD PRESSURE: 119 MMHG | BODY MASS INDEX: 27.32 KG/M2 | HEIGHT: 65 IN | DIASTOLIC BLOOD PRESSURE: 74 MMHG
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
## 2021-08-30 DIAGNOSIS — M89.8X2 PAIN OF LEFT HUMERUS: ICD-10-CM

## 2021-08-30 DIAGNOSIS — M25.532 LEFT WRIST PAIN: ICD-10-CM

## 2021-08-30 PROCEDURE — 73110 X-RAY EXAM OF WRIST: CPT | Performed by: RADIOLOGY

## 2021-08-30 PROCEDURE — 99213 OFFICE O/P EST LOW 20 MIN: CPT | Performed by: NURSE PRACTITIONER

## 2021-08-30 PROCEDURE — 73060 X-RAY EXAM OF HUMERUS: CPT | Performed by: RADIOLOGY

## 2021-08-30 PROCEDURE — 73110 X-RAY EXAM OF WRIST: CPT | Performed by: NURSE PRACTITIONER

## 2021-08-30 PROCEDURE — 73060 X-RAY EXAM OF HUMERUS: CPT | Performed by: NURSE PRACTITIONER

## 2021-08-30 NOTE — PROGRESS NOTES
"Chief Complaint  Fall (Pt presents here today for a fall she had, wrist pain, arm pain, 1 week ago.), Arm Pain, and Wrist Pain    Subjective          Navi Kinney presents to [facility]  Patient presents for evaluation of left wrist and upper arm pain following a fall 1 week ago.  This is a 80-year-old female.  Reports that she was putting chlorine into the pool and lost her balance, falling forward into the pool and hitting her left upper arm and left wrist on the side of the pool.  She has a large hematoma/bruising to the anterior aspect of the left arm which is  to touch.  Reports that range of motion of the shoulder and wrist are intact although she has some tenderness to the wrist with palpation as well.  Denies hitting her head in the fall and denies any new back pain.  She denies development of any other new issues today.      Objective   Vital Signs:   /74 (BP Location: Right arm, Patient Position: Sitting, Cuff Size: Adult)   Pulse 71   Temp 98.2 °F (36.8 °C)   Resp 18   Ht 165.1 cm (65\")   Wt 74.4 kg (164 lb)   SpO2 96%   BMI 27.29 kg/m²     Physical Exam  Vitals and nursing note reviewed.   Constitutional:       General: She is not in acute distress.     Appearance: Normal appearance. She is well-developed. She is not ill-appearing, toxic-appearing or diaphoretic.   HENT:      Head: Normocephalic and atraumatic.      Right Ear: External ear normal.      Left Ear: External ear normal.   Eyes:      Pupils: Pupils are equal, round, and reactive to light.   Neck:      Vascular: No carotid bruit.   Cardiovascular:      Rate and Rhythm: Normal rate and regular rhythm.      Pulses: Normal pulses.      Heart sounds: Normal heart sounds.      Comments: No peripheral edema  Pulmonary:      Effort: Pulmonary effort is normal. No respiratory distress.      Breath sounds: Normal breath sounds. No stridor. No wheezing, rhonchi or rales.   Chest:      Chest wall: " No tenderness.   Abdominal:      General: Bowel sounds are normal. There is no distension.      Palpations: Abdomen is soft. There is no mass.      Tenderness: There is no abdominal tenderness. There is no right CVA tenderness, left CVA tenderness, guarding or rebound.      Hernia: No hernia is present.   Musculoskeletal:         General: Normal range of motion.      Right upper arm: Swelling, tenderness and bony tenderness present.      Right wrist: Tenderness present.      Cervical back: Normal range of motion and neck supple. No rigidity or tenderness.   Lymphadenopathy:      Cervical: No cervical adenopathy.   Skin:     General: Skin is warm and dry.      Capillary Refill: Capillary refill takes less than 2 seconds.      Findings: Bruising (  Throughout left upper inner arm) present.   Neurological:      General: No focal deficit present.      Mental Status: She is alert and oriented to person, place, and time. Mental status is at baseline.   Psychiatric:         Mood and Affect: Mood normal.         Behavior: Behavior normal.         Thought Content: Thought content normal.         Judgment: Judgment normal.        Result Review :   The following data was reviewed by: CORNELL Barnett on 08/30/2021:  Common labs    Common Labsle 2/15/21 3/5/21 3/5/21 3/9/21     1149 1149    Glucose   124 (A)    BUN   22    Creatinine   1.04 (A)    eGFR Non  Am   51 (A)    eGFR African Am   62    Sodium   139    Potassium 4.2  4.1    Chloride   100    Calcium   9.6    Total Protein   6.4    Albumin   4.10    Total Bilirubin   0.5    Alkaline Phosphatase   99    AST (SGOT)   18    ALT (SGPT)   16    WBC  9.86     Hemoglobin  12.6     Hematocrit  37.9     Platelets  365     Microalbumin, Urine    39.4   (A) Abnormal value       Comments are available for some flowsheets but are not being displayed.           Current outpatient and discharge medications have been reconciled for the patient.  Reviewed by: Lulu Skelton  APRN           Assessment and Plan    Diagnoses and all orders for this visit:    1. Fall, initial encounter (Primary)  -     XR Humerus Left (In Office)  -     XR Wrist 3+ View Left (In Office)    2. Pain of left humerus  -     XR Humerus Left (In Office)    3. Left wrist pain  -     XR Wrist 3+ View Left (In Office)      Checking x-rays of the left wrist and left humerus to rule out fracture/dislocation.  Tylenol as needed for pain relief along with heat/ice alternation.    We will contact patient with imaging results and further recommendations.  Follow-up as needed and I will see her back at her next scheduled office visit.    Follow Up   No follow-ups on file.  Patient was given instructions and counseling regarding her condition or for health maintenance advice. Please see specific information pulled into the AVS if appropriate.

## 2021-09-01 DIAGNOSIS — M79.602 LEFT ARM PAIN: Primary | ICD-10-CM
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
## 2021-09-01 DIAGNOSIS — R93.6 ABNORMAL X-RAY OF SHOULDER: ICD-10-CM

## 2021-09-10 ENCOUNTER — OFFICE VISIT (OUTPATIENT)
Dept: INTERNAL MEDICINE | Facility: CLINIC | Age: 81
End: 2021-09-10

## 2021-09-10 VITALS
TEMPERATURE: 97.8 F | HEART RATE: 72 BPM | WEIGHT: 163 LBS | OXYGEN SATURATION: 95 % | DIASTOLIC BLOOD PRESSURE: 53 MMHG | BODY MASS INDEX: 27.16 KG/M2 | HEIGHT: 65 IN | SYSTOLIC BLOOD PRESSURE: 95 MMHG

## 2021-09-10 DIAGNOSIS — F32.89 OTHER DEPRESSION: Chronic | ICD-10-CM

## 2021-09-10 DIAGNOSIS — E11.9 NON-INSULIN DEPENDENT TYPE 2 DIABETES MELLITUS (HCC): Chronic | ICD-10-CM

## 2021-09-10 DIAGNOSIS — I10 ESSENTIAL HYPERTENSION: Chronic | ICD-10-CM

## 2021-09-10 DIAGNOSIS — E78.2 MIXED HYPERLIPIDEMIA: Primary | Chronic | ICD-10-CM

## 2021-09-10 DIAGNOSIS — B35.9 TINEA: ICD-10-CM

## 2021-09-10 PROBLEM — K58.9 IRRITABLE BOWEL SYNDROME: Status: ACTIVE | Noted: 2020-12-31

## 2021-09-10 PROBLEM — H91.90 HEARING LOSS: Status: ACTIVE | Noted: 2020-12-31

## 2021-09-10 PROBLEM — Z95.1 PRESENCE OF AORTOCORONARY BYPASS GRAFT: Status: ACTIVE | Noted: 2020-01-21

## 2021-09-10 LAB
ALBUMIN SERPL-MCNC: 4.4 G/DL (ref 3.5–5.2)
ALBUMIN/GLOB SERPL: 2.2 G/DL
ALP SERPL-CCNC: 95 U/L (ref 39–117)
ALT SERPL-CCNC: 19 U/L (ref 1–33)
AST SERPL-CCNC: 19 U/L (ref 1–32)
BILIRUB SERPL-MCNC: 0.3 MG/DL (ref 0–1.2)
BUN SERPL-MCNC: 25 MG/DL (ref 8–23)
BUN/CREAT SERPL: 23.8 (ref 7–25)
CALCIUM SERPL-MCNC: 9.7 MG/DL (ref 8.6–10.5)
CHLORIDE SERPL-SCNC: 100 MMOL/L (ref 98–107)
CHOLEST SERPL-MCNC: 149 MG/DL (ref 0–200)
CO2 SERPL-SCNC: 25.5 MMOL/L (ref 22–29)
CREAT SERPL-MCNC: 1.05 MG/DL (ref 0.57–1)
ERYTHROCYTE [DISTWIDTH] IN BLOOD BY AUTOMATED COUNT: 12.8 % (ref 12.3–15.4)
GLOBULIN SER CALC-MCNC: 2 GM/DL
GLUCOSE SERPL-MCNC: 131 MG/DL (ref 65–99)
HBA1C MFR BLD: 6.8 % (ref 4.8–5.6)
HCT VFR BLD AUTO: 39.4 % (ref 34–46.6)
HDLC SERPL-MCNC: 43 MG/DL (ref 40–60)
HGB BLD-MCNC: 13.3 G/DL (ref 12–15.9)
LDLC SERPL CALC-MCNC: 62 MG/DL (ref 0–100)
MCH RBC QN AUTO: 31.5 PG (ref 26.6–33)
MCHC RBC AUTO-ENTMCNC: 33.8 G/DL (ref 31.5–35.7)
MCV RBC AUTO: 93.4 FL (ref 79–97)
PLATELET # BLD AUTO: 295 10*3/MM3 (ref 140–450)
POTASSIUM SERPL-SCNC: 4.3 MMOL/L (ref 3.5–5.2)
PROT SERPL-MCNC: 6.4 G/DL (ref 6–8.5)
RBC # BLD AUTO: 4.22 10*6/MM3 (ref 3.77–5.28)
SODIUM SERPL-SCNC: 138 MMOL/L (ref 136–145)
TRIGL SERPL-MCNC: 277 MG/DL (ref 0–150)
TSH SERPL DL<=0.005 MIU/L-ACNC: 1.08 UIU/ML (ref 0.27–4.2)
VLDLC SERPL CALC-MCNC: 44 MG/DL (ref 5–40)
WBC # BLD AUTO: 9.26 10*3/MM3 (ref 3.4–10.8)

## 2021-09-10 PROCEDURE — 99214 OFFICE O/P EST MOD 30 MIN: CPT | Performed by: NURSE PRACTITIONER

## 2021-09-10 RX ORDER — KETOCONAZOLE 20 MG/G
CREAM TOPICAL
Qty: 15 G | Refills: 1 | Status: SHIPPED | OUTPATIENT
Start: 2021-09-10

## 2021-09-10 NOTE — PROGRESS NOTES
"Chief Complaint  Hyperlipidemia (6 mo follow up ), Hypertension, and Diabetes    Subjective          Navi Kinney presents to [facility]  Patient presents for 6-month follow-up.  This is a 80-year-old female.    She has type 2 diabetes treated with Metformin  mg daily reporting good compliance with this medication.    For depression she takes Cymbalta 30 mg daily reporting good overall control of symptoms, denies SI or HI.    For hypertension she takes chlorthalidone 25 mg daily, Toprol-XL 50 mg daily and amlodipine-benazepril 2.5-10 mg daily reporting good compliance with these medications and good overall control of blood pressures.    For hyperlipidemia she takes atorvastatin 20 mg daily with good compliance.    She has some cracking and flaking of 3 of her fingers following multiple manicures, the tip of her left thumb as well as the tips of the right ring and pinky finger.  They are a bit uncomfortable.  Worsening after multiple manicures.  She has been trying to use over-the-counter cream which has not helped much.    She denies development of any other new issues today.      Objective   Vital Signs:   BP 95/53   Pulse 72   Temp 97.8 °F (36.6 °C)   Ht 165.1 cm (65\")   Wt 73.9 kg (163 lb)   SpO2 95%   BMI 27.12 kg/m²     Physical Exam  Vitals and nursing note reviewed.   Constitutional:       General: She is not in acute distress.     Appearance: Normal appearance. She is well-developed. She is not ill-appearing, toxic-appearing or diaphoretic.   HENT:      Head: Normocephalic and atraumatic.      Right Ear: Tympanic membrane, ear canal and external ear normal.      Left Ear: Tympanic membrane, ear canal and external ear normal.   Eyes:      Pupils: Pupils are equal, round, and reactive to light.   Neck:      Vascular: No carotid bruit.   Cardiovascular:      Rate and Rhythm: Normal rate and regular rhythm.      Pulses: Normal pulses.      Heart sounds: Normal heart " sounds.      Comments: No peripheral edema  Pulmonary:      Effort: Pulmonary effort is normal. No respiratory distress.      Breath sounds: Normal breath sounds. No stridor. No wheezing, rhonchi or rales.   Chest:      Chest wall: No tenderness.   Abdominal:      General: Bowel sounds are normal. There is no distension.      Palpations: Abdomen is soft. There is no mass.      Tenderness: There is no abdominal tenderness. There is no right CVA tenderness, left CVA tenderness, guarding or rebound.      Hernia: No hernia is present.   Musculoskeletal:         General: Normal range of motion.      Cervical back: Normal range of motion and neck supple. No rigidity or tenderness.   Lymphadenopathy:      Cervical: No cervical adenopathy.   Skin:     General: Skin is warm and dry.      Capillary Refill: Capillary refill takes less than 2 seconds.      Comments: Left distal thumb, right distal small and ring fingers, lichenification with tinea   Neurological:      General: No focal deficit present.      Mental Status: She is alert and oriented to person, place, and time. Mental status is at baseline.   Psychiatric:         Mood and Affect: Mood normal.         Behavior: Behavior normal.         Thought Content: Thought content normal.         Judgment: Judgment normal.        Result Review :   The following data was reviewed by: CORNELL Barnett on 09/10/2021:  Common labs    Common Labsle 2/15/21 3/5/21 3/5/21 3/9/21     1149 1149    Glucose   124 (A)    BUN   22    Creatinine   1.04 (A)    eGFR Non  Am   51 (A)    eGFR African Am   62    Sodium   139    Potassium 4.2  4.1    Chloride   100    Calcium   9.6    Total Protein   6.4    Albumin   4.10    Total Bilirubin   0.5    Alkaline Phosphatase   99    AST (SGOT)   18    ALT (SGPT)   16    WBC  9.86     Hemoglobin  12.6     Hematocrit  37.9     Platelets  365     Microalbumin, Urine    39.4   (A) Abnormal value       Comments are available for some flowsheets  but are not being displayed.           Current outpatient and discharge medications have been reconciled for the patient.  Reviewed by: CORNELL Barnett           Assessment and Plan    Diagnoses and all orders for this visit:    1. Mixed hyperlipidemia (Primary)  Assessment & Plan:  Continue atorvastatin.  Lipid panel today and we will adjust therapy if needed.    Orders:  -     Lipid panel    2. Essential hypertension  Assessment & Plan:  Hypertension is stable, continue Toprol-XL, chlorthalidone and amlodipine-benazepril.    Orders:  -     CBC No Differential  -     Comprehensive metabolic panel  -     TSH Rfx On Abnormal To Free T4    3. Non-insulin dependent type 2 diabetes mellitus (CMS/[facility])  Assessment & Plan:  Continue Metformin  mg daily.  A1c today and we will adjust therapy if needed.    Orders:  -     Hemoglobin A1c    4. Other depression  Assessment & Plan:  Patient's depression is stable, continue Cymbalta.      5. Tinea  Comments:  Fingertips, treating with ketoconazole cream to be applied daily for 6 weeks.  Follow-up if symptoms persist or worsen.  Orders:  -     ketoconazole (NIZORAL) 2 % cream; Apply once daily to affected areas x 6 weeks  Dispense: 15 g; Refill: 1    We will contact patient with lab results and any further recommendations.  Follow-up as needed and I will see her back in 6 months for a Medicare wellness visit.    Follow Up   Return in about 6 months (around 3/10/2022) for Medicare Wellness.  Patient was given instructions and counseling regarding her condition or for health maintenance advice. Please see specific information pulled into the AVS if appropriate.

## 2021-09-10 NOTE — PATIENT INSTRUCTIONS
Zoster Vaccine, Recombinant injection  What is this medicine?  ZOSTER VACCINE (ZOS ter vak SEEN) is a vaccine used to reduce the risk of getting shingles. This vaccine is not used to treat shingles or nerve pain from shingles.  This medicine may be used for other purposes; ask your health care provider or pharmacist if you have questions.  COMMON BRAND NAME(S): SHINGRIX  What should I tell my health care provider before I take this medicine?  They need to know if you have any of these conditions:  · cancer  · immune system problems  · an unusual or allergic reaction to Zoster vaccine, other medications, foods, dyes, or preservatives  · pregnant or trying to get pregnant  · breast-feeding  How should I use this medicine?  This vaccine is injected into a muscle. It is given by a health care provider.  A copy of Vaccine Information Statements will be given before each vaccination. Be sure to read this information carefully each time. This sheet may change often.  Talk to your health care provider about the use of this vaccine in children. This vaccine is not approved for use in children.  Overdosage: If you think you have taken too much of this medicine contact a poison control center or emergency room at once.  NOTE: This medicine is only for you. Do not share this medicine with others.  What if I miss a dose?  Keep appointments for follow-up (booster) doses. It is important not to miss your dose. Call your health care provider if you are unable to keep an appointment.  What may interact with this medicine?  · medicines that suppress your immune system  · medicines to treat cancer  · steroid medicines like prednisone or cortisone  This list may not describe all possible interactions. Give your health care provider a list of all the medicines, herbs, non-prescription drugs, or dietary supplements you use. Also tell them if you smoke, drink alcohol, or use illegal drugs. Some items may interact with your medicine.  What  should I watch for while using this medicine?  Visit your health care provider regularly.  This vaccine, like all vaccines, may not fully protect everyone.  What side effects may I notice from receiving this medicine?  Side effects that you should report to your doctor or health care professional as soon as possible:  · allergic reactions (skin rash, itching or hives; swelling of the face, lips, or tongue)  · trouble breathing  Side effects that usually do not require medical attention (report these to your doctor or health care professional if they continue or are bothersome):  · chills  · headache  · fever  · nausea  · pain, redness, or irritation at site where injected  · tiredness  · vomiting  This list may not describe all possible side effects. Call your doctor for medical advice about side effects. You may report side effects to FDA at 4-195-FDA-2650.  Where should I keep my medicine?  This vaccine is only given by a health care provider. It will not be stored at home.  NOTE: This sheet is a summary. It may not cover all possible information. If you have questions about this medicine, talk to your doctor, pharmacist, or health care provider.  © 2021 [employer]vier/Gold Standard (2021-01-22 16:23:07)

## 2021-09-15 NOTE — PROGRESS NOTES
Please call patient.  Blood count looks good, no anemia.  Normal white blood cells and platelets.  Metabolic panel is showing overall stability in electrolytes, kidney function and liver enzymes.  Please sure to hydrate well each day to help with kidney function.  Cholesterol panel looks good except for triglycerides which are high at 277.  The nonfasting sample may have been contributing to high triglycerides but we need to watch this closely.  I recommend that she be taking 1000 mg omega-3 fish oil once daily to help lower triglycerides as well as watch intake of carbs, sugars and fats.  Thyroid numbers are stable.  A1c is well controlled at 6.8, continue current diabetic regimen.

## 2021-09-16 ENCOUNTER — TELEPHONE (OUTPATIENT)
Dept: INTERNAL MEDICINE | Facility: CLINIC | Age: 81
End: 2021-09-16

## 2021-10-18 ENCOUNTER — TELEPHONE (OUTPATIENT)
Dept: INTERNAL MEDICINE | Facility: CLINIC | Age: 81
End: 2021-10-18

## 2021-10-18 NOTE — TELEPHONE ENCOUNTER
Caller: Navi Kinney    Relationship: Self    Best call back number: [phone]    What medication are you requesting: SOMETHING FOR SINUS INFECTION     What are your current symptoms: SNEEZING, RUNNY NOSE, CONGESTION     How long have you been experiencing symptoms: SINCE YESTERDAY     Have you had these symptoms before:    [] Yes  [x] No    Have you been treated for these symptoms before:   [] Yes  [x] No    If a prescription is needed, what is your preferred pharmacy and phone number:      [facility] - [place], [place] - [address] - [phone]  - [phone]     Additional notes:

## 2021-10-19 NOTE — TELEPHONE ENCOUNTER
PATIENT CALLED BACK IN TO CHECK TO SEE IF MEDICATION HAD BEEN SENT IN. ADVISED PATIENT THAT PCP HAD 48 HOURS TO RESPOND, AND OFFERED PATIENT AN APPOINTMENT. PATIENT STATED SHE KNOWS ITS A SINUS INFECTION BECAUSE SHE HAS THE GREEN DISCHARGE FROM NOSE AND DOES NOT WANT TO COME IN FOR AN APPT. PLEASE ADVISE PATIENT IF SOMETHING WILL BE SENT IN. THANK YOU.

## 2021-10-29 RX ORDER — CHLORTHALIDONE 25 MG/1
TABLET ORAL
Qty: 90 TABLET | Refills: 2 | Status: SHIPPED | OUTPATIENT
Start: 2021-10-29 | End: 2022-08-02

## 2021-11-16 DIAGNOSIS — I10 ESSENTIAL HYPERTENSION: ICD-10-CM

## 2021-11-16 RX ORDER — AMLODIPINE BESYLATE AND BENAZEPRIL HYDROCHLORIDE 2.5; 1 MG/1; MG/1
CAPSULE ORAL
Qty: 90 CAPSULE | Refills: 0 | Status: SHIPPED | OUTPATIENT
Start: 2021-11-16 | End: 2022-02-14

## 2021-12-14 RX ORDER — DULOXETIN HYDROCHLORIDE 30 MG/1
CAPSULE, DELAYED RELEASE ORAL
Qty: 90 CAPSULE | Refills: 1 | Status: SHIPPED | OUTPATIENT
Start: 2021-12-14 | End: 2022-06-14

## 2022-01-03 DIAGNOSIS — E11.9 NON-INSULIN DEPENDENT TYPE 2 DIABETES MELLITUS: ICD-10-CM

## 2022-01-03 RX ORDER — METFORMIN HYDROCHLORIDE 500 MG/1
TABLET, EXTENDED RELEASE ORAL
Qty: 30 TABLET | Refills: 3 | Status: SHIPPED | OUTPATIENT
Start: 2022-01-03 | End: 2022-05-06

## 2022-02-08 RX ORDER — METOPROLOL SUCCINATE 50 MG/1
TABLET, EXTENDED RELEASE ORAL
Qty: 90 TABLET | Refills: 1 | Status: SHIPPED | OUTPATIENT
Start: 2022-02-08 | End: 2022-05-23

## 2022-02-12 DIAGNOSIS — I10 ESSENTIAL HYPERTENSION: ICD-10-CM

## 2022-02-14 ENCOUNTER — OFFICE VISIT (OUTPATIENT)
Dept: INTERNAL MEDICINE | Facility: CLINIC | Age: 82
End: 2022-02-14

## 2022-02-14 ENCOUNTER — HOSPITAL ENCOUNTER (OUTPATIENT)
Dept: GENERAL RADIOLOGY | Facility: HOSPITAL | Age: 82
Discharge: HOME | End: 2022-02-14
Admitting: NURSE PRACTITIONER

## 2022-02-14 VITALS
DIASTOLIC BLOOD PRESSURE: 78 MMHG | SYSTOLIC BLOOD PRESSURE: 126 MMHG | WEIGHT: 164.6 LBS | HEART RATE: 74 BPM | OXYGEN SATURATION: 98 % | HEIGHT: 65 IN | TEMPERATURE: 98 F | BODY MASS INDEX: 27.42 KG/M2

## 2022-02-14 DIAGNOSIS — R05.3 PERSISTENT COUGH: ICD-10-CM

## 2022-02-14 DIAGNOSIS — R29.6 FREQUENT FALLS: ICD-10-CM

## 2022-02-14 DIAGNOSIS — Z86.16 HISTORY OF COVID-19: Primary | ICD-10-CM

## 2022-02-14 DIAGNOSIS — Z86.16 HISTORY OF COVID-19: ICD-10-CM

## 2022-02-14 PROCEDURE — 99213 OFFICE O/P EST LOW 20 MIN: CPT | Performed by: NURSE PRACTITIONER

## 2022-02-14 PROCEDURE — 71046 X-RAY EXAM CHEST 2 VIEWS: CPT

## 2022-02-14 RX ORDER — AMLODIPINE BESYLATE AND BENAZEPRIL HYDROCHLORIDE 2.5; 1 MG/1; MG/1
CAPSULE ORAL
Qty: 90 CAPSULE | Refills: 0 | Status: SHIPPED | OUTPATIENT
Start: 2022-02-14 | End: 2022-05-16

## 2022-02-14 NOTE — PATIENT INSTRUCTIONS
Chest xray today  Add vitamin C 250 to 500 mg daily  Hydrate well, may add pedialyte once daily.  Strive for a good night's sleep

## 2022-02-14 NOTE — PROGRESS NOTES
"Chief Complaint  Cough (postive Covid 02/02/2022)    Subjective          Navi Kinney presents to [facility]  Patient presents for follow-up on recent Covid infection.  This is an 81-year-old female.    She was positive for Covid as of February 2, 2022.  She had cough and congestion which has gradually improved although she is still having a lingering dry cough.  No shortness of breath and for the most part cough is unproductive.  She endorses some lingering \"fatigue and brain fog\" but no outright headaches, no visual changes, shortness of breath or chest discomfort.    She is planning on selling her house and moving to an apartment that has maintenance for upkeep.  She has had some falls over the past 1 to 2 years and does not believe she is up to taking care of the maintenance of the home anymore.  She would like a letter written today explaining the need to move to a facility where maintenance is done for her.    Denies development of other new issues today.      Objective   Vital Signs:   /78 (BP Location: Left arm, Patient Position: Sitting, Cuff Size: Adult)   Pulse 74   Temp 98 °F (36.7 °C)   Ht 165.1 cm (65\")   Wt 74.7 kg (164 lb 9.6 oz)   SpO2 98%   BMI 27.39 kg/m²     Physical Exam  Vitals and nursing note reviewed.   Constitutional:       General: She is not in acute distress.     Appearance: Normal appearance. She is well-developed. She is not ill-appearing, toxic-appearing or diaphoretic.   HENT:      Head: Normocephalic and atraumatic.      Right Ear: External ear normal.      Left Ear: External ear normal.   Eyes:      Pupils: Pupils are equal, round, and reactive to light.   Neck:      Vascular: No carotid bruit.   Cardiovascular:      Rate and Rhythm: Normal rate and regular rhythm.      Pulses: Normal pulses.      Heart sounds: Normal heart sounds.      Comments: No peripheral edema  Pulmonary:      Effort: Pulmonary effort is normal. No respiratory " distress.      Breath sounds: Normal breath sounds. No stridor. No wheezing, rhonchi or rales.   Chest:      Chest wall: No tenderness.   Abdominal:      General: Bowel sounds are normal. There is no distension.      Palpations: Abdomen is soft. There is no mass.      Tenderness: There is no abdominal tenderness. There is no right CVA tenderness, left CVA tenderness, guarding or rebound.      Hernia: No hernia is present.   Musculoskeletal:         General: Normal range of motion.      Cervical back: Normal range of motion and neck supple. No rigidity or tenderness.   Lymphadenopathy:      Cervical: No cervical adenopathy.   Skin:     General: Skin is warm and dry.      Capillary Refill: Capillary refill takes less than 2 seconds.   Neurological:      General: No focal deficit present.      Mental Status: She is alert and oriented to person, place, and time. Mental status is at baseline.   Psychiatric:         Mood and Affect: Mood normal.         Behavior: Behavior normal.         Thought Content: Thought content normal.         Judgment: Judgment normal.        Result Review :   The following data was reviewed by: CORNELL Barnett on 02/14/2022:  Common labs    Common Labsle 3/5/21 3/5/21 3/9/21 9/10/21 9/10/21 9/10/21 9/10/21    1149 1149  0953 0953 0953 0953   Glucose  124 (A)   131 (A)     BUN  22   25 (A)     Creatinine  1.04 (A)   1.05 (A)     eGFR Non  Am  51 (A)   51 (A)     eGFR  Am  62   61     Sodium  139   138     Potassium  4.1   4.3     Chloride  100   100     Calcium  9.6   9.7     Total Protein  6.4   6.4     Albumin  4.10   4.40     Total Bilirubin  0.5   0.3     Alkaline Phosphatase  99   95     AST (SGOT)  18   19     ALT (SGPT)  16   19     WBC 9.86   9.26      Hemoglobin 12.6   13.3      Hematocrit 37.9   39.4      Platelets 365   295      Total Cholesterol      149    Triglycerides      277 (A)    HDL Cholesterol      43    LDL Cholesterol       62    Hemoglobin A1C        6.80 (A)   Microalbumin, Urine   39.4       (A) Abnormal value       Comments are available for some flowsheets but are not being displayed.           Current outpatient and discharge medications have been reconciled for the patient.  Reviewed by: CORNELL Barnett           Assessment and Plan    Diagnoses and all orders for this visit:    1. History of COVID-19 (Primary)  -     XR Chest PA & Lateral; Future    2. Persistent cough  -     XR Chest PA & Lateral; Future    3. Frequent falls      We will contact patient with results of x-rays and further recommendations.  Daily antihistamine is recommended.  I want to check the chest x-ray to ensure clear lung fields post Covid.    She will add vitamin C 250 to 500 mg once daily along with good hydration and electrolyte replacement.    I will contact her with x-ray results and further recommendations.  Follow-up as needed and at her next scheduled office visit.    Follow Up   Return if symptoms worsen or fail to improve, for Next scheduled follow up.  Patient was given instructions and counseling regarding her condition or for health maintenance advice. Please see specific information pulled into the AVS if appropriate.

## 2022-02-15 ENCOUNTER — TELEPHONE (OUTPATIENT)
Dept: INTERNAL MEDICINE | Facility: CLINIC | Age: 82
End: 2022-02-15

## 2022-02-15 NOTE — TELEPHONE ENCOUNTER
----- Message from CORNELL Barnett sent at 2/15/2022  2:34 PM EST -----  Please call pt (she has no mychart) and let her know that her chest xray is clear with no evidence of active infection. Unfortunately cough following COVID infection can last several weeks before completely disappearing. Lungs are well expanded and clear. If she would like some tessalon perles okay to send TID PRN #[id number]. Let us know of any persistent, worsening or recurrent symptoms.

## 2022-02-15 NOTE — PROGRESS NOTES
Please call pt (she has no mychart) and let her know that her chest xray is clear with no evidence of active infection. Unfortunately cough following COVID infection can last several weeks before completely disappearing. Lungs are well expanded and clear. If she would like some tessalon perles okay to send TID PRN #[id number]. Let us know of any persistent, worsening or recurrent symptoms.

## 2022-02-15 NOTE — TELEPHONE ENCOUNTER
Hub staff attempted to follow warm transfer process and was unsuccessful     Caller: Navi Kinney    Relationship to patient: Self    Best call back number:   Navi Kinney (Self) [phone] (H)         Patient is needing:   RETURNING CALL FROM THE  OFFICE

## 2022-02-16 ENCOUNTER — TELEPHONE (OUTPATIENT)
Dept: INTERNAL MEDICINE | Facility: CLINIC | Age: 82
End: 2022-02-16

## 2022-02-16 DIAGNOSIS — R05.3 PERSISTENT COUGH: Primary | ICD-10-CM

## 2022-02-16 RX ORDER — BENZONATATE 100 MG/1
100 CAPSULE ORAL 3 TIMES DAILY PRN
Qty: 90 CAPSULE | Refills: 1 | Status: SHIPPED | OUTPATIENT
Start: 2022-02-16

## 2022-02-24 ENCOUNTER — OFFICE VISIT (OUTPATIENT)
Dept: INTERNAL MEDICINE | Facility: CLINIC | Age: 82
End: 2022-02-24

## 2022-02-24 VITALS
HEART RATE: 61 BPM | RESPIRATION RATE: 15 BRPM | WEIGHT: 165 LBS | BODY MASS INDEX: 27.49 KG/M2 | SYSTOLIC BLOOD PRESSURE: 143 MMHG | OXYGEN SATURATION: 97 % | HEIGHT: 65 IN | DIASTOLIC BLOOD PRESSURE: 86 MMHG | TEMPERATURE: 98.1 F

## 2022-02-24 DIAGNOSIS — R39.9 UTI SYMPTOMS: Primary | ICD-10-CM

## 2022-02-24 LAB
BACTERIA UR QL AUTO: ABNORMAL /HPF
BILIRUB UR QL STRIP: NEGATIVE
CLARITY UR: ABNORMAL
COLOR UR: YELLOW
GLUCOSE UR STRIP-MCNC: NEGATIVE MG/DL
HGB UR QL STRIP.AUTO: ABNORMAL
HYALINE CASTS UR QL AUTO: ABNORMAL /LPF
KETONES UR QL STRIP: NEGATIVE
LEUKOCYTE ESTERASE UR QL STRIP.AUTO: ABNORMAL
NITRITE UR QL STRIP: POSITIVE
PH UR STRIP.AUTO: 5.5 [PH] (ref 5–8)
PROT UR QL STRIP: ABNORMAL
RBC # UR STRIP: ABNORMAL /HPF
REF LAB TEST METHOD: ABNORMAL
SP GR UR STRIP: 1.01 (ref 1–1.03)
SQUAMOUS #/AREA URNS HPF: ABNORMAL /HPF
TRANS CELLS #/AREA URNS HPF: ABNORMAL /HPF
UROBILINOGEN UR QL STRIP: ABNORMAL
WBC # UR STRIP: ABNORMAL /HPF

## 2022-02-24 PROCEDURE — 81001 URINALYSIS AUTO W/SCOPE: CPT | Performed by: NURSE PRACTITIONER

## 2022-02-24 PROCEDURE — 99213 OFFICE O/P EST LOW 20 MIN: CPT | Performed by: NURSE PRACTITIONER

## 2022-02-24 RX ORDER — AMOXICILLIN AND CLAVULANATE POTASSIUM 875; 125 MG/1; MG/1
1 TABLET, FILM COATED ORAL 2 TIMES DAILY
Qty: 14 TABLET | Refills: 0 | Status: SHIPPED | OUTPATIENT
Start: 2022-02-24 | End: 2022-03-03

## 2022-02-24 NOTE — PROGRESS NOTES
"Chief Complaint  Flank Pain (Pt presents here today with concerns of a kidney infection or UTI.) and Urinary Tract Infection    Subjective          Navi Kinney presents to [facility]  Patient presents for evaluation of a possible UTI.  This is an 81-year-old female.  Symptoms began about 3 days ago.  She endorses burning, itching and pain with urination.  She has taken nothing over-the-counter for her symptoms but has tried to increase fluid intake.  She has seen no outright blood in her urine, endorses some mild low back pain, no fever or chills.  Denies development of other new issues today.      Objective   Vital Signs:   /86 (BP Location: Right arm, Patient Position: Sitting, Cuff Size: Large Adult)   Pulse 61   Temp 98.1 °F (36.7 °C)   Resp 15   Ht 165.1 cm (65\")   Wt 74.8 kg (165 lb)   SpO2 97%   BMI 27.46 kg/m²     Physical Exam  Vitals and nursing note reviewed.   Constitutional:       General: She is not in acute distress.     Appearance: Normal appearance. She is well-developed. She is not ill-appearing, toxic-appearing or diaphoretic.   HENT:      Head: Normocephalic and atraumatic.      Right Ear: External ear normal.      Left Ear: External ear normal.   Eyes:      Pupils: Pupils are equal, round, and reactive to light.   Neck:      Vascular: No carotid bruit.   Cardiovascular:      Rate and Rhythm: Normal rate and regular rhythm.      Pulses: Normal pulses.      Heart sounds: Normal heart sounds.      Comments: No peripheral edema  Pulmonary:      Effort: Pulmonary effort is normal. No respiratory distress.      Breath sounds: Normal breath sounds. No stridor. No wheezing, rhonchi or rales.   Chest:      Chest wall: No tenderness.   Abdominal:      General: Bowel sounds are normal. There is no distension.      Palpations: Abdomen is soft. There is no mass.      Tenderness: There is no abdominal tenderness. There is no right CVA tenderness, left CVA " tenderness, guarding or rebound.      Hernia: No hernia is present.   Musculoskeletal:         General: Normal range of motion.      Cervical back: Normal range of motion and neck supple. No rigidity or tenderness.   Lymphadenopathy:      Cervical: No cervical adenopathy.   Skin:     General: Skin is warm and dry.      Capillary Refill: Capillary refill takes less than 2 seconds.   Neurological:      General: No focal deficit present.      Mental Status: She is alert and oriented to person, place, and time. Mental status is at baseline.   Psychiatric:         Mood and Affect: Mood normal.         Behavior: Behavior normal.         Thought Content: Thought content normal.         Judgment: Judgment normal.        Result Review :   The following data was reviewed by: CORNELL Barnett on 02/24/2022:  Common labs    Common Labsle 3/5/21 3/5/21 3/9/21 9/10/21 9/10/21 9/10/21 9/10/21    1149 1149  0953 0953 0953 0953   Glucose  124 (A)   131 (A)     BUN  22   25 (A)     Creatinine  1.04 (A)   1.05 (A)     eGFR Non  Am  51 (A)   51 (A)     eGFR  Am  62   61     Sodium  139   138     Potassium  4.1   4.3     Chloride  100   100     Calcium  9.6   9.7     Total Protein  6.4   6.4     Albumin  4.10   4.40     Total Bilirubin  0.5   0.3     Alkaline Phosphatase  99   95     AST (SGOT)  18   19     ALT (SGPT)  16   19     WBC 9.86   9.26      Hemoglobin 12.6   13.3      Hematocrit 37.9   39.4      Platelets 365   295      Total Cholesterol      149    Triglycerides      277 (A)    HDL Cholesterol      43    LDL Cholesterol       62    Hemoglobin A1C       6.80 (A)   Microalbumin, Urine   39.4       (A) Abnormal value       Comments are available for some flowsheets but are not being displayed.           Current outpatient and discharge medications have been reconciled for the patient.  Reviewed by: CORNELL Barnett       Brief Urine Lab Results  (Last result in the past 365 days)      Color   Clarity    Blood   Leuk Est   Nitrite   Protein   CREAT   Urine HCG        02/24/22 1406 Yellow   Cloudy   Small (1+)   Moderate (2+)   Positive   30 mg/dL (1+)                        Assessment and Plan    Diagnoses and all orders for this visit:    1. UTI symptoms (Primary)  -     Urinalysis With Microscopic - Urine, Clean Catch  -     Urine Culture - Urine, Urine, Clean Catch  -     amoxicillin-clavulanate (Augmentin) 875-125 MG per tablet; Take 1 tablet by mouth 2 (Two) Times a Day for 7 days.  Dispense: 14 tablet; Refill: 0      Urinalysis is showing signs of UTI and I will treat empirically with Augmentin while awaiting urine culture.  Continue with increased water intake.    We will contact patient with culture results and further recommendations.  Follow-up as needed and I will see her back at her next scheduled office visit.    Follow Up   Return if symptoms worsen or fail to improve, for Next scheduled follow up.  Patient was given instructions and counseling regarding her condition or for health maintenance advice. Please see specific information pulled into the AVS if appropriate.

## 2022-02-24 NOTE — PROGRESS NOTES
Please call and let the patient know that her urinalysis is showing signs of UTI, go ahead and start the antibiotic and we will get her the culture results as soon as they are back.

## 2022-02-27 LAB
BACTERIA UR CULT: ABNORMAL
BACTERIA UR CULT: ABNORMAL
OTHER ANTIBIOTIC SUSC ISLT: ABNORMAL

## 2022-02-28 NOTE — PROGRESS NOTES
Please call (she doesn't have mychart) and let pt know that her urine is positive for UTI which is susceptible to the Augmentin that I had sent in, please finish Rx in full.

## 2022-03-18 ENCOUNTER — OFFICE VISIT (OUTPATIENT)
Dept: INTERNAL MEDICINE | Facility: CLINIC | Age: 82
End: 2022-03-18

## 2022-03-18 VITALS
HEART RATE: 74 BPM | TEMPERATURE: 98.2 F | SYSTOLIC BLOOD PRESSURE: 127 MMHG | BODY MASS INDEX: 27.32 KG/M2 | RESPIRATION RATE: 17 BRPM | DIASTOLIC BLOOD PRESSURE: 77 MMHG | OXYGEN SATURATION: 97 % | HEIGHT: 65 IN | WEIGHT: 164 LBS

## 2022-03-18 DIAGNOSIS — F32.89 OTHER DEPRESSION: Chronic | ICD-10-CM

## 2022-03-18 DIAGNOSIS — E78.2 MIXED HYPERLIPIDEMIA: Chronic | ICD-10-CM

## 2022-03-18 DIAGNOSIS — Z00.00 MEDICARE ANNUAL WELLNESS VISIT, SUBSEQUENT: Primary | ICD-10-CM

## 2022-03-18 DIAGNOSIS — E11.9 NON-INSULIN DEPENDENT TYPE 2 DIABETES MELLITUS: Chronic | ICD-10-CM

## 2022-03-18 DIAGNOSIS — I10 PRIMARY HYPERTENSION: Chronic | ICD-10-CM

## 2022-03-18 PROCEDURE — 96160 PT-FOCUSED HLTH RISK ASSMT: CPT | Performed by: NURSE PRACTITIONER

## 2022-03-18 PROCEDURE — G0439 PPPS, SUBSEQ VISIT: HCPCS | Performed by: NURSE PRACTITIONER

## 2022-03-18 PROCEDURE — 1159F MED LIST DOCD IN RCRD: CPT | Performed by: NURSE PRACTITIONER

## 2022-03-18 PROCEDURE — 1126F AMNT PAIN NOTED NONE PRSNT: CPT | Performed by: NURSE PRACTITIONER

## 2022-03-18 PROCEDURE — 99214 OFFICE O/P EST MOD 30 MIN: CPT | Performed by: NURSE PRACTITIONER

## 2022-03-18 PROCEDURE — 1170F FXNL STATUS ASSESSED: CPT | Performed by: NURSE PRACTITIONER

## 2022-03-18 NOTE — PROGRESS NOTES
The ABCs of the Annual Wellness Visit  Subsequent Medicare Wellness Visit    Chief Complaint   Patient presents with   • Medicare Wellness-subsequent      Subjective    History of Present Illness:  Navi Kinney is a 81 y.o. female who presents for a Subsequent Medicare Wellness Visit and follow-up on chronic conditions.    She has type 2 diabetes, currently taking Metformin  mg daily with good compliance.  Last A1c September 2021 was well controlled at 6.8.    She has hypertension which has been stable with Imdur 60 mg daily, amlodipine-benazepril 2.5-10 mg daily, chlorthalidone 25 mg daily and Toprol-XL 50 mg daily.    She has hyperlipidemia endorsing good compliance with atorvastatin 20 mg daily.    She has depression and takes Cymbalta 30 mg daily endorsing good efficacy with this medication.    Overall reports that she is doing well, she has routine follow-up scheduled with nephrology, cardiology.    Denies development of any other new issues today.    The following portions of the patient's history were reviewed and   updated as appropriate: allergies, current medications, past family history, past medical history, past social history, past surgical history and problem list.    Compared to one year ago, the patient feels her physical   health is the same.    Compared to one year ago, the patient feels her mental   health is the same.    Recent Hospitalizations:  She was not admitted to the hospital during the last year.       Current Medical Providers:  Patient Care Team:  Lulu Skelton APRN as PCP - General (Nurse Practitioner)  Nereida Henson MD as Consulting Physician (Cardiology)  Negro Gusman MD as Consulting Physician (Nephrology)  Yoni Craig MD as Consulting Physician (Dermatology)    Outpatient Medications Prior to Visit   Medication Sig Dispense Refill   • amLODIPine-benazepril (LOTREL 2.5-10) 2.5-10 MG per capsule TAKE ONE CAPSULE BY MOUTH EVERY EVENING 90 capsule  0   • aspirin 81 MG chewable tablet Chew 81 mg Daily.     • atorvastatin (LIPITOR) 20 MG tablet Take 20 mg by mouth Daily.     • chlorthalidone (HYGROTON) 25 MG tablet TAKE ONE TABLET BY MOUTH DAILY 90 tablet 2   • colestipol (COLESTID) 1 g tablet Take 2 g by mouth.     • Cyanocobalamin (VITAMIN B-12 PO) Take 500 mcg by mouth Every Night.     • DULoxetine (CYMBALTA) 30 MG capsule TAKE ONE CAPSULE BY MOUTH EVERY MORNING 90 capsule 1   • isosorbide mononitrate (IMDUR) 60 MG 24 hr tablet Take 60 mg by mouth Daily.     • ketoconazole (NIZORAL) 2 % cream Apply once daily to affected areas x 6 weeks 15 g 1   • metFORMIN ER (GLUCOPHAGE-XR) 500 MG 24 hr tablet TAKE ONE TABLET BY MOUTH DAILY WITH BREAKFAST 30 tablet 3   • metoprolol succinate XL (TOPROL-XL) 50 MG 24 hr tablet TAKE ONE TABLET BY MOUTH NIGHTLY 90 tablet 1   • mupirocin (BACTROBAN) 2 % ointment Apply 1 application topically to the appropriate area as directed 3 (Three) Times a Day. APPLY TO AFFECTED AREA 15 g 0   • nitroglycerin (NITROSTAT) 0.4 MG SL tablet Place 1 tablet under the tongue Every 5 (Five) Minutes As Needed for Chest Pain. 90 tablet 3   • triamcinolone (KENALOG) 0.1 % ointment APPLY TOPICALLY TO AFFECTED AREA TWICE A DAY FOR 14 DAYS     • benzonatate (Tessalon Perles) 100 MG capsule Take 1 capsule by mouth 3 (Three) Times a Day As Needed for Cough. 90 capsule 1     No facility-administered medications prior to visit.       No opioid medication identified on active medication list. I have reviewed chart for other potential  high risk medication/s and harmful drug interactions in the elderly.          Aspirin is on active medication list. Aspirin use is indicated based on review of current medical condition/s. Pros and cons of this therapy have been discussed today. Benefits of this medication outweigh potential harm.  Patient has been encouraged to continue taking this medication.  .      Patient Active Problem List   Diagnosis   • Acute upper  "respiratory infection   • Ankle pain   • Arthralgia of hip   • Atherosclerosis of coronary artery   • Chronic kidney disease, stage III (moderate) ([facility])   • Depression   • Non-insulin dependent type 2 diabetes mellitus (HCC)   • Dyspnea on exertion   • Foot pain   • Hypertension   • Hyperlipidemia   • Degeneration of intervertebral disc of lumbar region   • Lumbar radiculopathy   • Spinal stenosis of lumbar region   • Osteoarthritis of hip   • Palpitations   • Seborrheic keratosis   • Visit for screening mammogram   • Torus palatinus   • Medicare annual wellness visit, subsequent   • Osteoarthritis of knee   • Acute pain of both shoulders/ since 7/2/18   • Right elbow pain   • Carotid artery stenosis   • Obesity   • Obstructive sleep apnea   • S/P CABG (coronary artery bypass graft)   • Sinus bradycardia   • Hearing loss   • Irritable bowel syndrome   • Presence of aortocoronary bypass graft     Advance Care Planning  Advance Directive is on file.  ACP discussion was held with the patient during this visit. Patient has an advance directive in EMR which is still valid.     Review of Systems   All other systems reviewed and are negative.       Objective    Vitals:    03/18/22 1035   BP: 127/77   BP Location: Right arm   Patient Position: Sitting   Cuff Size: Adult   Pulse: 74   Resp: 17   Temp: 98.2 °F (36.8 °C)   SpO2: 97%   Weight: 74.4 kg (164 lb)   Height: 165.1 cm (65\")   PainSc: 0-No pain     BMI Readings from Last 1 Encounters:   03/18/22 27.29 kg/m²   BMI is above normal parameters. Recommendations include: educational material    Does the patient have evidence of cognitive impairment? No    Physical Exam  Vitals and nursing note reviewed.   Constitutional:       General: She is not in acute distress.     Appearance: Normal appearance. She is well-developed. She is not ill-appearing, toxic-appearing or diaphoretic.   HENT:      Head: Normocephalic and atraumatic.      Right Ear: Tympanic membrane, ear canal " and external ear normal.      Left Ear: Tympanic membrane, ear canal and external ear normal.   Eyes:      Pupils: Pupils are equal, round, and reactive to light.   Neck:      Vascular: No carotid bruit.   Cardiovascular:      Rate and Rhythm: Normal rate and regular rhythm.      Pulses: Normal pulses.      Heart sounds: Normal heart sounds.      Comments: No peripheral edema  Pulmonary:      Effort: Pulmonary effort is normal. No respiratory distress.      Breath sounds: Normal breath sounds. No stridor. No wheezing, rhonchi or rales.   Chest:      Chest wall: No tenderness.   Abdominal:      General: Bowel sounds are normal. There is no distension.      Palpations: Abdomen is soft. There is no mass.      Tenderness: There is no abdominal tenderness. There is no right CVA tenderness, left CVA tenderness, guarding or rebound.      Hernia: No hernia is present.   Musculoskeletal:         General: Normal range of motion.      Cervical back: Normal range of motion and neck supple. No rigidity or tenderness.   Lymphadenopathy:      Cervical: No cervical adenopathy.   Skin:     General: Skin is warm and dry.      Capillary Refill: Capillary refill takes less than 2 seconds.   Neurological:      General: No focal deficit present.      Mental Status: She is alert and oriented to person, place, and time. Mental status is at baseline.   Psychiatric:         Mood and Affect: Mood normal.         Behavior: Behavior normal.         Thought Content: Thought content normal.         Judgment: Judgment normal.         Current outpatient and discharge medications have been reconciled for the patient.  Reviewed by: CORNELL Barnett    Lab Results   Component Value Date    GLUCOSE 131 (H) 09/10/2021    BUN 25 (H) 09/10/2021    CREATININE 1.05 (H) 09/10/2021    EGFRIFNONA 51 (L) 09/10/2021    EGFRIFAFRI 61 09/10/2021    BCR 23.8 09/10/2021    K 4.3 09/10/2021    CO2 25.5 09/10/2021    CALCIUM 9.7 09/10/2021    PROTENTOTREF 6.4  09/10/2021    ALBUMIN 4.40 09/10/2021    LABIL2 2.2 09/10/2021    AST 19 09/10/2021    ALT 19 09/10/2021              HEALTH RISK ASSESSMENT    Smoking Status:  Social History     Tobacco Use   Smoking Status Former Smoker   • Packs/day: 1.00   • Years: 25.00   • Pack years: 25.00   • Types: Cigarettes   • Quit date:    • Years since quittin.2   Smokeless Tobacco Never Used     Alcohol Consumption:  Social History     Substance and Sexual Activity   Alcohol Use No     Fall Risk Screen:    STEADI Fall Risk Assessment was completed, and patient is at LOW risk for falls.Assessment completed on:3/18/2022    Depression Screening:  PHQ-2/PHQ-9 Depression Screening 3/18/2022   Retired Total Score -   Little Interest or Pleasure in Doing Things 0-->not at all   Feeling Down, Depressed or Hopeless 0-->not at all   PHQ-9: Brief Depression Severity Measure Score 0       Health Habits and Functional and Cognitive Screening:  Functional & Cognitive Status 3/18/2022   Do you have difficulty preparing food and eating? No   Do you have difficulty bathing yourself, getting dressed or grooming yourself? No   Do you have difficulty using the toilet? No   Do you have difficulty moving around from place to place? No   Do you have trouble with steps or getting out of a bed or a chair? No   Current Diet Well Balanced Diet   Dental Exam Up to date   Eye Exam Up to date   Exercise (times per week) 0 times per week   Current Exercises Include No Regular Exercise   Current Exercise Activities Include -   Do you need help using the phone?  No   Are you deaf or do you have serious difficulty hearing?  Yes   Do you need help with transportation? No   Do you need help shopping? No   Do you need help preparing meals?  No   Do you need help with housework?  No   Do you need help with laundry? No   Do you need help taking your medications? No   Do you need help managing money? No   Do you ever drive or ride in a car without wearing a seat  belt? No   Have you felt unusual stress, anger or loneliness in the last month? No   Who do you live with? Alone   If you need help, do you have trouble finding someone available to you? No   Have you been bothered in the last four weeks by sexual problems? No   Do you have difficulty concentrating, remembering or making decisions? Yes       Age-appropriate Screening Schedule:  Refer to the list below for future screening recommendations based on patient's age, sex and/or medical conditions. Orders for these recommended tests are listed in the plan section. The patient has been provided with a written plan.    Health Maintenance   Topic Date Due   • ZOSTER VACCINE (1 of 2) Never done   • URINE MICROALBUMIN  03/09/2022   • HEMOGLOBIN A1C  03/10/2022   • DIABETIC EYE EXAM  05/18/2022   • LIPID PANEL  09/10/2022   • MAMMOGRAM  04/29/2023   • DXA SCAN  06/18/2023   • TDAP/TD VACCINES (2 - Td or Tdap) 05/04/2027   • INFLUENZA VACCINE  Completed              Assessment/Plan   CMS Preventative Services Quick Reference  Risk Factors Identified During Encounter  Cardiovascular Disease  Dementia/Memory   Depression/Dysphoria  Inactivity/Sedentary  Obesity/Overweight   The above risks/problems have been discussed with the patient.  Follow up actions/plans if indicated are seen below in the Assessment/Plan Section.  Pertinent information has been shared with the patient in the After Visit Summary.    Diagnoses and all orders for this visit:    1. Medicare annual wellness visit, subsequent (Primary)    2. Mixed hyperlipidemia  Assessment & Plan:  Continue atorvastatin.  Lipid panel today and we will adjust therapy if needed.    Orders:  -     Lipid panel    3. Primary hypertension  Assessment & Plan:  Hypertension is stable, continue amlodipine-benazepril, Imdur, Toprol-XL and chlorthalidone.  Routine home monitoring for goal of less than 140/80 along with DASH diet.    Orders:  -     CBC No Differential  -     Comprehensive  metabolic panel  -     TSH Rfx On Abnormal To Free T4    4. Non-insulin dependent type 2 diabetes mellitus (HCC)  Assessment & Plan:  Continue Metformin XR.  A1c, microalbumin today and we will adjust therapy if needed.    Orders:  -     Hemoglobin A1c  -     MicroAlbumin, Urine, Random - Urine, Clean Catch    5. Other depression  Assessment & Plan:  Patient's depression is stable, continue Cymbalta.        Follow Up:   Return in about 6 months (around 9/18/2022).     We will contact patient with lab results and any further recommendations.  Follow-up as needed and I will see her back in 6 months for recheck of chronic conditions.    An After Visit Summary and PPPS were made available to the patient.

## 2022-03-18 NOTE — ASSESSMENT & PLAN NOTE
Hypertension is stable, continue amlodipine-benazepril, Imdur, Toprol-XL and chlorthalidone.  Routine home monitoring for goal of less than 140/80 along with DASH diet.

## 2022-03-19 LAB
ALBUMIN SERPL-MCNC: 4.2 G/DL (ref 3.7–4.7)
ALBUMIN/GLOB SERPL: 1.9 {RATIO} (ref 1.2–2.2)
ALP SERPL-CCNC: 88 IU/L (ref 44–121)
ALT SERPL-CCNC: 21 IU/L (ref 0–32)
AST SERPL-CCNC: 19 IU/L (ref 0–40)
BILIRUB SERPL-MCNC: 0.3 MG/DL (ref 0–1.2)
BUN SERPL-MCNC: 21 MG/DL (ref 8–27)
BUN/CREAT SERPL: 22 (ref 12–28)
CALCIUM SERPL-MCNC: 9.9 MG/DL (ref 8.7–10.3)
CHLORIDE SERPL-SCNC: 100 MMOL/L (ref 96–106)
CHOLEST SERPL-MCNC: 139 MG/DL (ref 100–199)
CO2 SERPL-SCNC: 23 MMOL/L (ref 20–29)
CREAT SERPL-MCNC: 0.97 MG/DL (ref 0.57–1)
EGFRCR SERPLBLD CKD-EPI 2021: 59 ML/MIN/1.73
ERYTHROCYTE [DISTWIDTH] IN BLOOD BY AUTOMATED COUNT: 12.5 % (ref 11.7–15.4)
GLOBULIN SER CALC-MCNC: 2.2 G/DL (ref 1.5–4.5)
GLUCOSE SERPL-MCNC: 131 MG/DL (ref 65–99)
HBA1C MFR BLD: 7.3 % (ref 4.8–5.6)
HCT VFR BLD AUTO: 40.3 % (ref 34–46.6)
HDLC SERPL-MCNC: 45 MG/DL
HGB BLD-MCNC: 13.5 G/DL (ref 11.1–15.9)
LDLC SERPL CALC-MCNC: 65 MG/DL (ref 0–99)
MCH RBC QN AUTO: 30.8 PG (ref 26.6–33)
MCHC RBC AUTO-ENTMCNC: 33.5 G/DL (ref 31.5–35.7)
MCV RBC AUTO: 92 FL (ref 79–97)
PLATELET # BLD AUTO: 321 X10E3/UL (ref 150–450)
POTASSIUM SERPL-SCNC: 4.8 MMOL/L (ref 3.5–5.2)
PROT SERPL-MCNC: 6.4 G/DL (ref 6–8.5)
RBC # BLD AUTO: 4.39 X10E6/UL (ref 3.77–5.28)
SODIUM SERPL-SCNC: 140 MMOL/L (ref 134–144)
TRIGL SERPL-MCNC: 174 MG/DL (ref 0–149)
TSH SERPL DL<=0.005 MIU/L-ACNC: 1.21 UIU/ML (ref 0.45–4.5)
UNABLE TO VOID: NORMAL
VLDLC SERPL CALC-MCNC: 29 MG/DL (ref 5–40)
WBC # BLD AUTO: 8.1 X10E3/UL (ref 3.4–10.8)

## 2022-03-25 ENCOUNTER — TELEPHONE (OUTPATIENT)
Dept: INTERNAL MEDICINE | Facility: CLINIC | Age: 82
End: 2022-03-25

## 2022-03-25 NOTE — PROGRESS NOTES
Please give pt a call, she doesn't have mychart-her blood count is normal with no anemia, normal white blood cells and platelets.  Kidney function, liver enzymes and electrolytes are stable.  Triglycerides have improved since last check.  Total and LDL cholesterol are stable along with thyroid numbers.  Her A1c has risen to 7.3.  The goal is to have it less than 7.0.  Please continue with current dose of medications and watch intake of carbs and sugars in the diet.  We will recheck at her next visit and if needed we can discuss medication changes but for now please try to limit sugar along with getting in some regular walking/exercise intake to help lower this with lifestyle intervention.

## 2022-03-25 NOTE — TELEPHONE ENCOUNTER
----- Message from CORNELL Barnett sent at 3/25/2022  1:26 PM EDT -----  Please give pt a call, she doesn't have mychart-her blood count is normal with no anemia, normal white blood cells and platelets.  Kidney function, liver enzymes and electrolytes are stable.  Triglycerides have improved since last check.  Total and LDL cholesterol are stable along with thyroid numbers.  Her A1c has risen to 7.3.  The goal is to have it less than 7.0.  Please continue with current dose of medications and watch intake of carbs and sugars in the diet.  We will recheck at her next visit and if needed we can discuss medication changes but for now please try to limit sugar along with getting in some regular walking/exercise intake to help lower this with lifestyle intervention.

## 2022-03-31 ENCOUNTER — TELEPHONE (OUTPATIENT)
Dept: INTERNAL MEDICINE | Facility: CLINIC | Age: 82
End: 2022-03-31

## 2022-03-31 NOTE — TELEPHONE ENCOUNTER
Caller: Navi Kinney    Relationship: Self    Best call back number: [phone]    Who are you requesting to speak with (clinical staff, provider,  specific staff member): CLINICAL STAFF     What was the call regarding: PATIENT STATES SHE FEELS LIKE HER UTI HAS NOT GONE AWAY AND REQUESTING A STRONGER ANTIBIOTIC IF POSSIBLE     Do you require a callback: YES

## 2022-04-06 ENCOUNTER — TELEPHONE (OUTPATIENT)
Dept: INTERNAL MEDICINE | Facility: CLINIC | Age: 82
End: 2022-04-06

## 2022-04-06 ENCOUNTER — OFFICE VISIT (OUTPATIENT)
Dept: INTERNAL MEDICINE | Facility: CLINIC | Age: 82
End: 2022-04-06

## 2022-04-06 VITALS
HEIGHT: 65 IN | SYSTOLIC BLOOD PRESSURE: 110 MMHG | DIASTOLIC BLOOD PRESSURE: 70 MMHG | OXYGEN SATURATION: 93 % | TEMPERATURE: 97.7 F | HEART RATE: 76 BPM | WEIGHT: 164.3 LBS | BODY MASS INDEX: 27.38 KG/M2

## 2022-04-06 DIAGNOSIS — R35.0 FREQUENCY OF URINATION: Primary | ICD-10-CM

## 2022-04-06 DIAGNOSIS — N30.00 ACUTE CYSTITIS WITHOUT HEMATURIA: Primary | ICD-10-CM

## 2022-04-06 DIAGNOSIS — N89.8 VAGINAL ITCHING: ICD-10-CM

## 2022-04-06 LAB
BACTERIA UR QL AUTO: ABNORMAL /HPF
BILIRUB UR QL STRIP: NEGATIVE
CLARITY UR: ABNORMAL
COLOR UR: YELLOW
GLUCOSE UR STRIP-MCNC: NEGATIVE MG/DL
HGB UR QL STRIP.AUTO: ABNORMAL
HYALINE CASTS UR QL AUTO: ABNORMAL /LPF
KETONES UR QL STRIP: NEGATIVE
LEUKOCYTE ESTERASE UR QL STRIP.AUTO: ABNORMAL
MUCOUS THREADS URNS QL MICRO: ABNORMAL /HPF
NITRITE UR QL STRIP: POSITIVE
PH UR STRIP.AUTO: 5.5 [PH] (ref 5–8)
PROT UR QL STRIP: ABNORMAL
RBC # UR STRIP: ABNORMAL /HPF
REF LAB TEST METHOD: ABNORMAL
SP GR UR STRIP: 1.02 (ref 1–1.03)
SQUAMOUS #/AREA URNS HPF: ABNORMAL /HPF
UROBILINOGEN UR QL STRIP: ABNORMAL
WBC # UR STRIP: ABNORMAL /HPF

## 2022-04-06 PROCEDURE — 99213 OFFICE O/P EST LOW 20 MIN: CPT | Performed by: NURSE PRACTITIONER

## 2022-04-06 PROCEDURE — 81001 URINALYSIS AUTO W/SCOPE: CPT | Performed by: NURSE PRACTITIONER

## 2022-04-06 RX ORDER — DOXYCYCLINE HYCLATE 100 MG/1
100 CAPSULE ORAL 2 TIMES DAILY
Qty: 14 CAPSULE | Refills: 0 | Status: SHIPPED | OUTPATIENT
Start: 2022-04-06 | End: 2022-04-13

## 2022-04-06 NOTE — PROGRESS NOTES
Attempted to call patient-- but voicemail is not set up. Sending over doxycycline for UTI per urinalysis results.   Will try to call with nuswab results when available. Please try to call patient again for me if you can, thank you

## 2022-04-06 NOTE — PROGRESS NOTES
"Chief Complaint  Urinary Tract Infection (Pt c/o burning and itching)    Subjective          Navi Kinney presents to [facility]  History of Present Illness  This is an 82 y/o female presenting to office with complaints of dysuria and some vaginal itching. Patient reports these symptoms started 1 week ago. Patient reports her urine is showing odiferous. Patient denies any fever. Patient denies n/v/d. Patient reports she has not noticed any discharge. Patient denies any hematuria.  Patient recently treated at end of February 2022 with augmentin for UTI.   Objective   Vital Signs:   /70 (BP Location: Left arm, Patient Position: Sitting, Cuff Size: Adult)   Pulse 76   Temp 97.7 °F (36.5 °C) (Temporal)   Ht 165.1 cm (65\")   Wt 74.5 kg (164 lb 4.8 oz)   SpO2 93%   BMI 27.34 kg/m²     BMI is above normal parameters. Recommendations: educational material discussed/shared in after visit summary       Physical Exam  Vitals and nursing note reviewed.   Constitutional:       Appearance: Normal appearance. She is normal weight.   HENT:      Head: Normocephalic and atraumatic.   Eyes:      Pupils: Pupils are equal, round, and reactive to light.   Cardiovascular:      Rate and Rhythm: Normal rate and regular rhythm.      Pulses: Normal pulses.      Heart sounds: Normal heart sounds. No murmur heard.    No friction rub. No gallop.   Pulmonary:      Effort: Pulmonary effort is normal. No respiratory distress.      Breath sounds: Normal breath sounds. No stridor. No wheezing, rhonchi or rales.   Abdominal:      General: Bowel sounds are normal. There is no distension.      Palpations: Abdomen is soft.      Tenderness: There is no abdominal tenderness.   Musculoskeletal:         General: No swelling or deformity. Normal range of motion.      Cervical back: Normal range of motion and neck supple.   Skin:     General: Skin is warm and dry.      Coloration: Skin is not jaundiced.      " Findings: No bruising.   Neurological:      General: No focal deficit present.      Mental Status: She is alert and oriented to person, place, and time. Mental status is at baseline.   Psychiatric:         Mood and Affect: Mood normal.         Behavior: Behavior normal.         Thought Content: Thought content normal.         Judgment: Judgment normal.        Result Review :   The following data was reviewed by: CORNELL Mathias on 04/06/2022:  Common labs    Common Labsle 9/10/21 9/10/21 9/10/21 9/10/21 3/18/22 3/18/22 3/18/22 3/18/22    0953 [id number] 0953 0953 1135 1135 1135 1135   Glucose  131 (A)    131 (A)     BUN  25 (A)    21     Creatinine  1.05 (A)    0.97     eGFR Non African Am  51 (A)         eGFR African Am  61         Sodium  138    140     Potassium  4.3    4.8     Chloride  100    100     Calcium  9.7    9.9     Total Protein  6.4    6.4     Albumin  4.40    4.2     Total Bilirubin  0.3    0.3     Alkaline Phosphatase  95    88     AST (SGOT)  19    19     ALT (SGPT)  19    21     WBC 9.26    8.1      Hemoglobin 13.3    13.5      Hematocrit 39.4    40.3      Platelets 295    321      Total Cholesterol   149    139    Triglycerides   277 (A)    174 (A)    HDL Cholesterol   43    45    LDL Cholesterol    62    65    Hemoglobin A1C    6.80 (A)    7.3 (A)   (A) Abnormal value       Comments are available for some flowsheets but are not being displayed.                    Assessment and Plan    Diagnoses and all orders for this visit:    1. Frequency of urination (Primary)  Assessment & Plan:  UA+CS today.   Will call with results.     Orders:  -     Urinalysis With Culture If Indicated - Urine, Clean Catch    2. Vaginal itching  Assessment & Plan:  Nuswab ordered.   Will call with results and treatment recommendations if necessary.  Offered pelvic examination, patient declined.     Orders:  -     NuSwab VG+, Candida 6sp      Follow Up   Return if symptoms worsen or fail to improve.  Patient was given  instructions and counseling regarding her condition or for health maintenance advice. Please see specific information pulled into the AVS if appropriate.

## 2022-04-06 NOTE — TELEPHONE ENCOUNTER
Caller: Navi iKnney    Relationship: Self    Best call back number: [phone]       PATIENT HAD A MISSED CALL FROM THE OFFICE BUT NO VOICE MESSAGE WAS LEFT.    TRIED TO TRANSFER TO OFFICE AND NO ANSWER.    SO PLEASE CALL PATIENT BACK.

## 2022-04-06 NOTE — ASSESSMENT & PLAN NOTE
Nuswab ordered.   Will call with results and treatment recommendations if necessary.  Offered pelvic examination, patient declined.

## 2022-04-08 LAB
A VAGINAE DNA VAG QL NAA+PROBE: NORMAL SCORE
BVAB2 DNA VAG QL NAA+PROBE: NORMAL SCORE
C ALBICANS DNA VAG QL NAA+PROBE: NEGATIVE
C GLABRATA DNA VAG QL NAA+PROBE: NEGATIVE
C KRUSEI DNA VAG QL NAA+PROBE: NEGATIVE
C LUSITANIAE DNA VAG QL NAA+PROBE: NEGATIVE
C TRACH DNA VAG QL NAA+PROBE: NEGATIVE
CANDIDA DNA VAG QL NAA+PROBE: NEGATIVE
MEGA1 DNA VAG QL NAA+PROBE: NORMAL SCORE
N GONORRHOEA DNA VAG QL NAA+PROBE: NEGATIVE
T VAGINALIS DNA VAG QL NAA+PROBE: NEGATIVE
UNABLE TO VOID: NORMAL

## 2022-04-11 LAB
BACTERIA UR CULT: ABNORMAL
OTHER ANTIBIOTIC SUSC ISLT: ABNORMAL

## 2022-04-12 LAB — REF LAB TEST METHOD: NORMAL

## 2022-04-14 ENCOUNTER — TELEPHONE (OUTPATIENT)
Dept: INTERNAL MEDICINE | Facility: CLINIC | Age: 82
End: 2022-04-14

## 2022-04-14 NOTE — TELEPHONE ENCOUNTER
I spoke with the pt and she has had some nausea last night and vomited this morning. I advised the pt there is a GI bug going around and try to sip on some water/gatorade and maybe try eating some ice chips/pedialyte popsicles and to give us a call back if she got worse and not better and/or go to her local urgent care/[facility] clinic.

## 2022-04-14 NOTE — TELEPHONE ENCOUNTER
Caller: Navi Kinney    Relationship: Self    Best call back number: [phone]    What is the best time to reach you: ANY TIME    Who are you requesting to speak with (clinical staff, provider,  specific staff member): CLINICAL STAFF    What was the call regarding: PATIENT CALLED REQUESTING TO SPEAK WITH DOROTHY BHARDWAJ'S MEDICAL ASSISTANT. SHE HAS SOME QUESTIONS ABOUT SOME THINGS THAT HAVE BEEN GOING ON.    PLEASE ADVISE    Do you require a callback: YES

## 2022-05-06 DIAGNOSIS — E11.9 NON-INSULIN DEPENDENT TYPE 2 DIABETES MELLITUS: ICD-10-CM

## 2022-05-06 RX ORDER — METFORMIN HYDROCHLORIDE 500 MG/1
TABLET, EXTENDED RELEASE ORAL
Qty: 30 TABLET | Refills: 3 | Status: SHIPPED | OUTPATIENT
Start: 2022-05-06 | End: 2022-10-04 | Stop reason: SDUPTHER

## 2022-05-16 DIAGNOSIS — I10 ESSENTIAL HYPERTENSION: ICD-10-CM

## 2022-05-16 RX ORDER — AMLODIPINE BESYLATE AND BENAZEPRIL HYDROCHLORIDE 2.5; 1 MG/1; MG/1
CAPSULE ORAL
Qty: 90 CAPSULE | Refills: 1 | Status: SHIPPED | OUTPATIENT
Start: 2022-05-16 | End: 2022-08-16

## 2022-05-23 RX ORDER — METOPROLOL SUCCINATE 50 MG/1
TABLET, EXTENDED RELEASE ORAL
Qty: 90 TABLET | Refills: 1 | Status: SHIPPED | OUTPATIENT
Start: 2022-05-23

## 2022-06-14 RX ORDER — DULOXETIN HYDROCHLORIDE 30 MG/1
CAPSULE, DELAYED RELEASE ORAL
Qty: 90 CAPSULE | Refills: 1 | Status: SHIPPED | OUTPATIENT
Start: 2022-06-14

## 2022-07-25 RX ORDER — MONTELUKAST SODIUM 4 MG/1
2 TABLET, CHEWABLE ORAL DAILY
Qty: 90 TABLET | Refills: 1 | Status: SHIPPED | OUTPATIENT
Start: 2022-07-25 | End: 2022-09-06

## 2022-07-25 NOTE — TELEPHONE ENCOUNTER
Caller: Navi Kinney    Relationship: Self    Best call back number:  [phone]    Requested Prescriptions:   Requested Prescriptions     Pending Prescriptions Disp Refills   • colestipol (COLESTID) 1 g tablet       Sig: Take 2 tablets by mouth.        Pharmacy where request should be sent: [facility] - [place], [place] - [address] - [phone]  - [phone] FX     Additional details provided by patient: PATIENT IS COMPLETELY OUT    Does the patient have less than a 3 day supply:  [x] Yes  [] No    Lyudmila Godoy Rep   07/25/22 09:54 EDT

## 2022-07-27 ENCOUNTER — OFFICE VISIT (OUTPATIENT)
Dept: INTERNAL MEDICINE | Facility: CLINIC | Age: 82
End: 2022-07-27

## 2022-07-27 VITALS
SYSTOLIC BLOOD PRESSURE: 125 MMHG | BODY MASS INDEX: 27.26 KG/M2 | OXYGEN SATURATION: 96 % | HEART RATE: 69 BPM | TEMPERATURE: 98 F | HEIGHT: 65 IN | RESPIRATION RATE: 18 BRPM | DIASTOLIC BLOOD PRESSURE: 84 MMHG | WEIGHT: 163.6 LBS

## 2022-07-27 DIAGNOSIS — K58.0 IRRITABLE BOWEL SYNDROME WITH DIARRHEA: Primary | ICD-10-CM

## 2022-07-27 PROCEDURE — 99213 OFFICE O/P EST LOW 20 MIN: CPT | Performed by: NURSE PRACTITIONER

## 2022-07-27 NOTE — PROGRESS NOTES
"Chief Complaint  Abdominal Pain (Pt presents here today with a few stomach issues )    Subjective        Navi Kinney presents to [facility]  History of Present Illness     Patient is a pleasant 81 year old female who typically sees CORNELL Barnett here in the office.   She is new to me and here today with c/o abdominal cramping in the lower region x 2 days and more so in the LLQ.     She denies any fever or chills at this time. Hx of appendectomy, hernia repairs with mesh, and cholecystectomy.    Patient currently takes 2 tablets by mouth daily/Colestipol 1 GM for her history of diarrhea/IBS.     She went to the  center yesterday and an Xray of the abdomen was done and she was diagnosed with constipation.     She reports normal bowel pattern today x 2.   The doctor at the  center told her to discontinue the Colestipol for a few days.       Objective   Vital Signs:  /84 (BP Location: Right arm, Patient Position: Sitting, Cuff Size: Adult)   Pulse 69   Temp 98 °F (36.7 °C)   Resp 18   Ht 165.1 cm (65\")   Wt 74.2 kg (163 lb 9.6 oz)   SpO2 96%   BMI 27.22 kg/m²   Estimated body mass index is 27.22 kg/m² as calculated from the following:    Height as of this encounter: 165.1 cm (65\").    Weight as of this encounter: 74.2 kg (163 lb 9.6 oz).          Physical Exam  Vitals and nursing note reviewed.   Constitutional:       Appearance: Normal appearance.   HENT:      Head: Normocephalic.      Nose: Nose normal.      Mouth/Throat:      Mouth: Mucous membranes are moist.   Eyes:      Pupils: Pupils are equal, round, and reactive to light.   Cardiovascular:      Rate and Rhythm: Normal rate and regular rhythm.      Pulses: Normal pulses.      Heart sounds: Normal heart sounds.      Comments: No peripheral edema noted.   Pulmonary:      Effort: Pulmonary effort is normal. No respiratory distress.      Breath sounds: Normal breath sounds. No stridor. No wheezing, rhonchi or " rales.      Comments: Denies SOB  Chest:      Chest wall: No tenderness.   Abdominal:      General: Bowel sounds are normal. There is no distension.      Tenderness: There is no abdominal tenderness. There is no guarding.   Musculoskeletal:         General: Normal range of motion.   Skin:     General: Skin is warm.      Capillary Refill: Capillary refill takes less than 2 seconds.   Neurological:      Mental Status: She is alert and oriented to person, place, and time.   Psychiatric:         Behavior: Behavior normal.        Result Review :    Common labs    Common Labsle 9/10/21 9/10/21 9/10/21 9/10/21 3/18/22 3/18/22 3/18/22 3/18/22 6/29/22    0953 0953 0953 0953 1135 1135 1135 1135    Glucose  131 (A)    131 (A)      BUN  25 (A)    21      Creatinine  1.05 (A)    0.97      eGFR Non African Am  51 (A)          eGFR African Am  61          Sodium  138    140      Potassium  4.3    4.8      Chloride  100    100      Calcium  9.7    9.9      Total Protein  6.4    6.4      Albumin  4.40    4.2      Total Bilirubin  0.3    0.3      Alkaline Phosphatase  95    88      AST (SGOT)  19    19      ALT (SGPT)  19    21      WBC 9.26    8.1    7.58   Hemoglobin 13.3    13.5    13.7   Hematocrit 39.4    40.3    41.9   Platelets 295    321    266   Total Cholesterol   149    139     Triglycerides   277 (A)    174 (A)     HDL Cholesterol   43    45     LDL Cholesterol    62    65     Hemoglobin A1C    6.80 (A)    7.3 (A)    (A) Abnormal value       Comments are available for some flowsheets but are not being displayed.                     Assessment and Plan   Diagnoses and all orders for this visit:    1. Irritable bowel syndrome with diarrhea (Primary)  -     Ambulatory Referral to Gastroenterology    Patient is aware if she has any worsening symptoms such as fever or chills or worsening abdominal pain that she will go to the emergency room.  An ambulatory referral to gastroenterology was placed in the office today due to her  IBS.  Patient agrees to this treatment plan at this time.         Follow Up   Return if symptoms worsen or fail to improve.  Patient was given instructions and counseling regarding her condition or for health maintenance advice. Please see specific information pulled into the AVS if appropriate.

## 2022-07-27 NOTE — PATIENT INSTRUCTIONS
If you have any worsening pain, fever or chills please go to the hospital for evaluation and treatment.    I have placed a referral to a gastroenterologist at this time.

## 2022-08-02 RX ORDER — CHLORTHALIDONE 25 MG/1
TABLET ORAL
Qty: 90 TABLET | Refills: 2 | Status: SHIPPED | OUTPATIENT
Start: 2022-08-02

## 2022-08-16 DIAGNOSIS — I10 ESSENTIAL HYPERTENSION: ICD-10-CM

## 2022-08-16 RX ORDER — AMLODIPINE BESYLATE AND BENAZEPRIL HYDROCHLORIDE 2.5; 1 MG/1; MG/1
CAPSULE ORAL
Qty: 90 CAPSULE | Refills: 1 | Status: SHIPPED | OUTPATIENT
Start: 2022-08-16 | End: 2023-01-11

## 2022-09-06 RX ORDER — MONTELUKAST SODIUM 4 MG/1
TABLET, CHEWABLE ORAL
Qty: 90 TABLET | Refills: 1 | Status: SHIPPED | OUTPATIENT
Start: 2022-09-06

## 2022-10-04 DIAGNOSIS — E11.9 NON-INSULIN DEPENDENT TYPE 2 DIABETES MELLITUS: ICD-10-CM

## 2022-10-04 RX ORDER — METFORMIN HYDROCHLORIDE 500 MG/1
TABLET, EXTENDED RELEASE ORAL
Qty: 90 TABLET | Refills: 0 | Status: SHIPPED | OUTPATIENT
Start: 2022-10-04

## 2022-10-24 RX ORDER — METOPROLOL SUCCINATE 50 MG/1
TABLET, EXTENDED RELEASE ORAL
Qty: 90 TABLET | Refills: 1 | OUTPATIENT
Start: 2022-10-24

## 2022-10-24 RX ORDER — DULOXETIN HYDROCHLORIDE 30 MG/1
CAPSULE, DELAYED RELEASE ORAL
Qty: 90 CAPSULE | Refills: 1 | OUTPATIENT
Start: 2022-10-24

## 2022-10-24 RX ORDER — MONTELUKAST SODIUM 4 MG/1
TABLET, CHEWABLE ORAL
Qty: 90 TABLET | Refills: 1 | OUTPATIENT
Start: 2022-10-24

## 2022-10-28 NOTE — TELEPHONE ENCOUNTER
----- Message from CORNELL Barnett sent at 2/15/2022  2:34 PM EST -----  Please call pt (she has no mychart) and let her know that her chest xray is clear with no evidence of active infection. Unfortunately cough following COVID infection can last several weeks before completely disappearing. Lungs are well expanded and clear. If she would like some tessalon perles okay to send TID PRN #[id number]. Let us know of any persistent, worsening or recurrent symptoms.   
Pt advised Xray results. Pt verbalized understanding.  Tessalon Pearles sent.  
none

## 2023-01-11 DIAGNOSIS — I10 ESSENTIAL HYPERTENSION: ICD-10-CM

## 2023-01-11 RX ORDER — AMLODIPINE BESYLATE AND BENAZEPRIL HYDROCHLORIDE 2.5; 1 MG/1; MG/1
CAPSULE ORAL
Qty: 90 CAPSULE | Refills: 0 | Status: SHIPPED | OUTPATIENT
Start: 2023-01-11

## 2023-03-31 NOTE — ANESTHESIA PROCEDURE NOTES
Airway  Urgency: elective    Airway not difficult    General Information and Staff    Patient location during procedure: OR  Anesthesiologist: HANS FLORES    Indications and Patient Condition  Indications for airway management: airway protection    Preoxygenated: yes  Mask difficulty assessment: 1 - vent by mask    Final Airway Details  Final airway type: endotracheal airway      Successful airway: ETT  Cuffed: yes   Successful intubation technique: direct laryngoscopy  Endotracheal tube insertion site: oral  Blade: Yamila  Blade size: #3  ETT size: 7.0 mm  Cormack-Lehane Classification: grade I - full view of glottis  Placement verified by: chest auscultation and capnometry   Inital cuff pressure (cm H2O): 21  Cuff volume (mL): 5  Measured from: gums  ETT to gums (cm): 21  Number of attempts at approach: 1    Additional Comments  BSBE. Atraumatic. +ETCO2.            
Additional Progress Note...

## 2023-04-05 RX ORDER — CHLORTHALIDONE 25 MG/1
TABLET ORAL
Qty: 90 TABLET | Refills: 2 | OUTPATIENT
Start: 2023-04-05

## 2023-04-17 DIAGNOSIS — I10 ESSENTIAL HYPERTENSION: ICD-10-CM

## 2023-04-18 RX ORDER — AMLODIPINE BESYLATE AND BENAZEPRIL HYDROCHLORIDE 2.5; 1 MG/1; MG/1
CAPSULE ORAL
Qty: 90 CAPSULE | Refills: 0 | OUTPATIENT
Start: 2023-04-18

## 2023-06-19 DIAGNOSIS — I10 ESSENTIAL HYPERTENSION: ICD-10-CM

## 2023-06-19 RX ORDER — AMLODIPINE BESYLATE AND BENAZEPRIL HYDROCHLORIDE 2.5; 1 MG/1; MG/1
CAPSULE ORAL
Qty: 90 CAPSULE | Refills: 0 | OUTPATIENT
Start: 2023-06-19

## 2024-09-10 ENCOUNTER — OFFICE VISIT (OUTPATIENT)
Dept: FAMILY MEDICINE CLINIC | Facility: CLINIC | Age: 84
End: 2024-09-10
Payer: MEDICARE

## 2024-09-10 VITALS
WEIGHT: 164 LBS | HEIGHT: 65 IN | TEMPERATURE: 98.6 F | SYSTOLIC BLOOD PRESSURE: 128 MMHG | BODY MASS INDEX: 27.32 KG/M2 | OXYGEN SATURATION: 97 % | HEART RATE: 88 BPM | DIASTOLIC BLOOD PRESSURE: 78 MMHG

## 2024-09-10 DIAGNOSIS — Z95.1 S/P CABG (CORONARY ARTERY BYPASS GRAFT): ICD-10-CM

## 2024-09-10 DIAGNOSIS — N18.31 STAGE 3A CHRONIC KIDNEY DISEASE: ICD-10-CM

## 2024-09-10 DIAGNOSIS — E78.2 MIXED HYPERLIPIDEMIA: Chronic | ICD-10-CM

## 2024-09-10 DIAGNOSIS — E11.9 NON-INSULIN DEPENDENT TYPE 2 DIABETES MELLITUS: Chronic | ICD-10-CM

## 2024-09-10 DIAGNOSIS — I10 PRIMARY HYPERTENSION: Primary | Chronic | ICD-10-CM

## 2024-09-10 DIAGNOSIS — M17.0 PRIMARY OSTEOARTHRITIS OF BOTH KNEES: ICD-10-CM

## 2024-09-10 DIAGNOSIS — R41.3 MEMORY CHANGES: ICD-10-CM

## 2024-09-10 DIAGNOSIS — I65.29 STENOSIS OF CAROTID ARTERY, UNSPECIFIED LATERALITY: ICD-10-CM

## 2024-09-10 DIAGNOSIS — F33.1 MODERATE EPISODE OF RECURRENT MAJOR DEPRESSIVE DISORDER: Chronic | ICD-10-CM

## 2024-09-10 PROCEDURE — 99214 OFFICE O/P EST MOD 30 MIN: CPT | Performed by: NURSE PRACTITIONER

## 2024-09-10 PROCEDURE — 1126F AMNT PAIN NOTED NONE PRSNT: CPT | Performed by: NURSE PRACTITIONER

## 2024-09-10 PROCEDURE — 3074F SYST BP LT 130 MM HG: CPT | Performed by: NURSE PRACTITIONER

## 2024-09-10 PROCEDURE — 3078F DIAST BP <80 MM HG: CPT | Performed by: NURSE PRACTITIONER

## 2024-09-10 RX ORDER — DULOXETIN HYDROCHLORIDE 60 MG/1
60 CAPSULE, DELAYED RELEASE ORAL EVERY MORNING
Qty: 90 CAPSULE | Refills: 2 | Status: SHIPPED | OUTPATIENT
Start: 2024-09-10

## 2024-09-10 RX ORDER — DAPAGLIFLOZIN 10 MG/1
10 TABLET, FILM COATED ORAL
Qty: 90 TABLET | Refills: 2 | Status: SHIPPED | OUTPATIENT
Start: 2024-09-10

## 2024-09-10 RX ORDER — BENAZEPRIL HYDROCHLORIDE 20 MG/1
20 TABLET ORAL DAILY
Qty: 90 TABLET | Refills: 2 | Status: SHIPPED | OUTPATIENT
Start: 2024-09-10

## 2024-09-10 NOTE — PROGRESS NOTES
Chief Complaint  Establish Care    Subjective        HPI   Navi presents to [facility] as an 83-year-old female to establish care, follow-up with chronic conditions, order labs, refill medications, obtain referrals.  Overall doing okay    Hypertension-currently she states that she is taking benazepril 20 mg p.o. daily and metoprolol 50 mg daily.  Blood pressure seems to be controlled.  She has no headaches no blurred vision no dizziness no flushing no chest pain or pressure    Hyperlipidemia-currently taking atorvastatin 20 mg p.o. daily.  She is tolerating this medication well with no muscle weakness or myalgias    Type 2 diabetes    Since the last visit, she has overall felt well.  she has been compliant with current meds.  she denies medication side effects.  Glucose control with medication is well controlled   The last HgbA1C result was   Lab Results   Component Value Date    HGBA1C 6.2 (H) 12/31/2023   .  The last dilated eye exam was 2023  Denies any polys, no open ulcerations or changes in numbness and tingling  States that her PCP while living in [place] did DC her metformin and she was taking glipizide at 1 time but that has also been DC'd.  She is currently only taking Farxiga.  Denies any medication side effects.  She does occasionally check her blood sugar in the morning states that it usually runs around 140  She does have upcoming labs with nephrology    She has been having some increased memory changes.  She did have an episode in December that caused her to have some strokelike symptoms and she did lose vision temporarily on her right side.  States that she did undergo some testing and an MRI it was all negative.  She has had some increase stress since moving to [place].  She is very concerned about forgetting names and words more often.  She would like a referral to neurology for further assessment    She does see specialist.  Cardiology, GI and nephrology.  She does  "have upcoming appointments        She does have some pain bilaterally in both knees.  She has had hip replacement on the left side.  She does have osteoarthritis.  She describes the pain as aching and stiff worse early in the morning.  She would like referral to Ortho she did see Dr. Albert Espino in the past and would like to see that office.  No acute swelling.  She is able to walk and bear weight.  Pain is intermittently 2-6 out of 10    She has been feeling down and having more stress with recent life changes  She is currently taking duloxetine 30 mg.  She feels this has helped her well in the past but she would be agreeable to increasing her dose.  She is having a lot of tears and feeling sad most of the time.  She denies any current SI or HI.  She is not currently counseling    She has no other acute complaints    The following portions of the patient's history were reviewed and updated as appropriate: allergies, current medications, past family history, past medical history, past social history, past surgical history, and problem list      Review of Systems   Constitutional:  Negative for chills, fatigue and fever.   Eyes:  Negative for visual disturbance.   Respiratory:  Negative for cough, shortness of breath and wheezing.    Cardiovascular:  Negative for chest pain and leg swelling.   Gastrointestinal:  Negative for abdominal pain, diarrhea, nausea and vomiting.   Endocrine: Negative for cold intolerance, heat intolerance, polydipsia, polyphagia and polyuria.   Skin:  Negative for rash.   Neurological:  Negative for dizziness.   Psychiatric/Behavioral:  Negative for self-injury, sleep disturbance and suicidal ideas. The patient is not nervous/anxious.         Objective   Vital Signs:   Vitals:    09/10/24 1309   BP: 128/78   Pulse: 88   Temp: 98.6 °F (37 °C)   SpO2: 97%   Weight: 74.4 kg (164 lb)   Height: 165.1 cm (65\")   PainSc: 0-No pain            9/10/2024     1:14 PM   PHQ-2/PHQ-9 Depression Screening "   Little Interest or Pleasure in Doing Things 0-->not at all   Feeling Down, Depressed or Hopeless 1-->several days   PHQ-9: Brief Depression Severity Measure Score 1       BMI is >= 25 and <30. (Overweight) The following options were offered after discussion;: weight loss educational material (shared in after visit summary)        Physical Exam  Vitals reviewed.   Constitutional:       General: She is not in acute distress.  Eyes:      Conjunctiva/sclera: Conjunctivae normal.   Neck:      Thyroid: No thyromegaly.      Vascular: No carotid bruit.   Cardiovascular:      Rate and Rhythm: Normal rate and regular rhythm.      Heart sounds: Normal heart sounds. No murmur heard.     No gallop.   Pulmonary:      Effort: Pulmonary effort is normal. No respiratory distress.      Breath sounds: Normal breath sounds. No stridor. No wheezing, rhonchi or rales.   Chest:      Chest wall: No tenderness.   Lymphadenopathy:      Cervical: No cervical adenopathy.   Neurological:      Mental Status: She is alert and oriented to person, place, and time.   Psychiatric:         Attention and Perception: Attention normal.         Mood and Affect: Mood normal.        Diabetic Foot Exam Performed and Monofilament Test Performed  Result Review :     The following data was reviewed by: CORNELL Buckner on 09/10/2024:  BASIC METABOLIC PANEL (12/31/2023 09:38)GFR 50  HEMOGLOBIN A1C (12/31/2023 09:38) 6.2 improved from last year at 6.6 (06/12/2023) and (9.0 3/14/2023)  LIPID PANEL (12/31/2023 09:38) total 213, LDL 79    COMPREHENSIVE METABOLIC PANEL (01/30/2024 20:50) GFR 58 normal liver enzymes  CBC AND DIFFERENTIAL (01/30/2024 20:50) H&H 14.8/44.9, platelet count normal     Assessment and Plan    Assessment & Plan  Primary hypertension  Hypertension is stable and controlled  Continue current treatment regimen.  Blood pressure will be reassessed in 6 months.  Primary osteoarthritis of both knees  Referral to ortho  Memory  changes  Referral to neuor-  last MRI 12/23  Mixed hyperlipidemia   Lipid abnormalities are stable    Plan:  Continue same medication/s without change.      Discussed medication dosage, use, side effects, and goals of treatment in detail.    Counseled patient on lifestyle modifications to help control hyperlipidemia.     Patient Treatment Goals:   LDL goal is less than 70    Followup in 6 months.  Stenosis of carotid artery, unspecified laterality  Keep follow up with cardiology  S/P CABG (coronary artery bypass graft)  Keep follow up with cardiology  Non-insulin dependent type 2 diabetes mellitus  Diabetes is stable.   Continue current treatment regimen.  Diabetes will be reassessed in 6 months  Stage 3a chronic kidney disease  Keep follow up with nephrology  Moderate episode of recurrent major depressive disorder  Increase duloxetine  To ER with any SI/HI  Report any medication side effects immediately      Orders Placed This Encounter   Procedures    Comprehensive metabolic panel    Lipid panel    TSH    Hemoglobin A1c    T4, Free    Ambulatory Referral to Orthopedic Surgery    Ambulatory Referral to Neurology    CBC and Differential     New Medications Ordered This Visit   Medications    benazepril (LOTENSIN) 20 MG tablet     Sig: Take 1 tablet by mouth Daily.     Dispense:  90 tablet     Refill:  2     On hold when due    Farxiga 10 MG tablet     Sig: Take 10 mg by mouth Every Morning Before Breakfast.     Dispense:  90 tablet     Refill:  2     On hold when needed    DULoxetine (CYMBALTA) 60 MG capsule     Sig: Take 1 capsule by mouth Every Morning.     Dispense:  90 capsule     Refill:  2     This prescription was filled on 6/14/2022. Any refills authorized will be placed on file.          Follow Up   Return in about 3 months (around 12/10/2024) for Medicare Wellness, Annual physical.  Patient was given instructions and counseling regarding her condition or for health maintenance advice. Please see specific  information pulled into the AVS if appropriate.

## 2024-10-30 ENCOUNTER — OFFICE VISIT (OUTPATIENT)
Dept: NEUROLOGY | Facility: CLINIC | Age: 84
End: 2024-10-30
Payer: MEDICARE

## 2024-10-30 VITALS
WEIGHT: 162 LBS | HEIGHT: 65 IN | DIASTOLIC BLOOD PRESSURE: 84 MMHG | HEART RATE: 65 BPM | SYSTOLIC BLOOD PRESSURE: 126 MMHG | BODY MASS INDEX: 26.99 KG/M2

## 2024-10-30 DIAGNOSIS — R41.3 MEMORY CHANGES: Primary | ICD-10-CM

## 2024-10-30 PROBLEM — I12.9 BENIGN HYPERTENSIVE KIDNEY DISEASE WITH CHRONIC KIDNEY DISEASE: Chronic | Status: ACTIVE | Noted: 2023-04-14

## 2024-10-30 PROBLEM — E55.9 VITAMIN D DEFICIENCY: Chronic | Status: ACTIVE | Noted: 2022-12-27

## 2024-10-30 PROBLEM — E78.5 DYSLIPIDEMIA: Chronic | Status: ACTIVE | Noted: 2023-04-14

## 2024-10-30 PROBLEM — M10.9 GOUT: Status: ACTIVE | Noted: 2023-10-19

## 2024-10-30 PROBLEM — G45.9 TIA (TRANSIENT ISCHEMIC ATTACK): Status: ACTIVE | Noted: 2023-12-31

## 2024-10-30 NOTE — PROGRESS NOTES
[facility] NEUROLOGY         Date of Visit: 10/30/2024    Name: Navi Kinney    :  1940    PCP: Christina Olivia APRN    Visit Type: an initial evaluation         Subjective     Patient ID: Navi is a 83 y.o. female.         History of Present Illness  I had the pleasure of seeing your patient for the first time today.  As you may know she is an 83-year-old female here today for initial evaluation for concerns of memory loss.  She was referred by her primary care nurse practitioner.    History:    Patient does have history of coronary artery disease with previous CABG, hypertension, hyperlipidemia, chronic kidney disease stage III, IBS, depression, lumbar radiculopathy, JACINTA, TIA.    Patient did end up having an MRI of the brain as well as CTA head and neck in 2023 due to concerns for some intermittent right sided weakness.  All imaging came back within normal limits with mild atherosclerotic disease with no flow-limiting stenosis and no significant abnormalities noted on MRI brain.  She had no signs or symptoms of stroke.  She continues on cholesterol medication and aspirin prophylactically.    Patient states that she has noticed some very mild cognitive changes mainly since she retired about a year ago from working.  She states that it is mostly word finding difficulty at times as well as occasionally walking into rooms and not remembering why she walks in.  For the majority of the time patient is able to remember what she was looking for or come up with the word eventually however just seems to take longer than before.  She recently had a good friend who has been diagnosed with early dementia and has now been concerned and wanted to make sure there were no other abnormalities.    Patient denies any family history of dementia.  No previous history of head injuries.  No previous history of cancer.    Current:    Patient denies any difficulty with instrumental activities of daily  living such as managing finances, driving, managing medications.  She did recently moved back to [place] from [place] and will be living in her own home soon.  She is currently living with her son and daughter-in-law.  She states that she is cooking without difficulty.  No difficulty with ADLs.  She denies sleep issues.  She denies any significant anxiety or depression.  She feels that she is managing the transition back to [place] well.  No hallucinations, vivid dreams, tremor, gait changes.  No other new neurological complaints at today's visit.          The following portions of the patient's history were reviewed and updated as appropriate: allergies, current medications, past family history, past medical history, past social history, past surgical history, and problem list.                 Review of Systems   Constitutional:  Negative for activity change, appetite change, fatigue and unexpected weight change.   HENT:  Negative for hearing loss and trouble swallowing.    Eyes:  Negative for visual disturbance.   Respiratory:  Negative for chest tightness and shortness of breath.    Cardiovascular:  Negative for palpitations.   Gastrointestinal:  Negative for constipation, diarrhea, nausea and vomiting.   Genitourinary:  Negative for decreased urine volume, difficulty urinating and frequency.   Musculoskeletal:  Negative for gait problem.   Neurological:  Negative for tremors, syncope, facial asymmetry, speech difficulty, weakness and light-headedness.   Psychiatric/Behavioral:  Positive for confusion. Negative for agitation, behavioral problems, dysphoric mood, hallucinations and sleep disturbance. The patient is not nervous/anxious.             Current Medications:    Current Outpatient Medications   Medication Instructions    aspirin 81 mg, Daily    atorvastatin (LIPITOR) 20 mg, Daily    benazepril (LOTENSIN) 20 mg, Oral, Daily    colestipol (COLESTID) 1 g tablet TAKE TWO TABLETS BY MOUTH DAILY     "Cyanocobalamin (VITAMIN B-12 PO) 500 mcg, Nightly    DULoxetine (CYMBALTA) 60 mg, Oral, Every Morning    Farxiga 10 mg, Oral, Every Morning Before Breakfast    metoprolol succinate XL (TOPROL-XL) 50 MG 24 hr tablet TAKE ONE TABLET BY MOUTH EVERY NIGHT    mupirocin (BACTROBAN) 2 % ointment 1 application , Topical, 3 Times Daily, APPLY TO AFFECTED AREA          /84   Pulse 65   Ht 165.1 cm (65\")   Wt 73.5 kg (162 lb)   BMI 26.96 kg/m²                Objective     Neurological Exam  Mental Status  Awake and alert. Oriented to person, place, time and situation. Recalls 3 of 3 objects immediately. Speech is normal. Language is fluent with no aphasia. Attention and concentration are normal. 3/5.    Cranial Nerves  CN II: Visual fields full to confrontation.  CN III, IV, VI: Extraocular movements intact bilaterally. Normal lids and orbits bilaterally. Pupils equal round and reactive to light bilaterally.  CN V: Facial sensation is normal.  CN VII: Full and symmetric facial movement.  CN IX, X: Palate elevates symmetrically  CN XI: Shoulder shrug strength is normal.  CN XII: Tongue midline without atrophy or fasciculations.    Motor  Normal muscle bulk throughout. Normal muscle tone. No abnormal involuntary movements. Strength is 5/5 throughout all four extremities.    Sensory  Sensation is intact to light touch, pinprick, vibration and proprioception in all four extremities.    Reflexes  Deep tendon reflexes are 2+ and symmetric in all four extremities.    Coordination    Finger-to-nose, rapid alternating movements and heel-to-shin normal bilaterally without dysmetria.    Gait  Normal casual, toe, heel and tandem gait.      Physical Exam  Constitutional:       General: She is awake.      Appearance: Normal appearance. She is normal weight.   Eyes:      General: Lids are normal.      Extraocular Movements: Extraocular movements intact.      Pupils: Pupils are equal, round, and reactive to light.   Pulmonary:      " Effort: Pulmonary effort is normal.   Skin:     General: Skin is warm.   Neurological:      Mental Status: She is alert.      Motor: Motor strength is normal.     Coordination: Coordination is intact.      Deep Tendon Reflexes: Reflexes are normal and symmetric.   Psychiatric:         Mood and Affect: Mood normal.         Speech: Speech normal.         Behavior: Behavior normal.                     Assessment & Plan     Diagnoses and all orders for this visit:    1. Memory changes (Primary)       At this time patient did score fairly well on Mini-Mental status examination.  I do not feel that her symptoms currently are concerning for more serious cognitive disorder however would be willing to continue to monitor patient for changes.  If patient's symptoms worsen we will go ahead and plan for neuropsychological testing.  Otherwise at this time we will continue to monitor.  Patient would like to follow-up as needed.      Total of 40 minutes was spent with patient discussing diagnosis, prognosis, plan of care, reviewing previous testing and performing cognitive evaluation.            Shea SARABIA    Neurology    [facility] [place]    Phone: [phone]    10/30/2024 , 14:29 EDT

## 2024-11-08 NOTE — TELEPHONE ENCOUNTER
Caller: Navi Kinney    Relationship: Self    Best call back number: [phone]     Requested Prescriptions:   Requested Prescriptions     Pending Prescriptions Disp Refills    colestipol (COLESTID) 1 g tablet 90 tablet 1     Sig: Take 2 tablets by mouth Daily.        Pharmacy where request should be sent: [facility] - [place], [place] - [address] [facility] - [phone]  - [phone]     Last office visit with prescribing clinician: 9/10/2024   Last telemedicine visit with prescribing clinician: Visit date not found   Next office visit with prescribing clinician: Visit date not found     Additional details provided by patient: PATIENT STATED THIS PRESCRIPTION WAS GIVEN BY A GASTROENTEROLOGIST.    PATIENT STATED THE GASTROENTEROLOGIST ADVISED SHE WOULD NOT REFILL THIS PRESCRIPTION UNTIL SHE IS SEEN AGAIN IN MAY 2025.    PATIENT STATED THAT IS FAR TOO LONG, AND WAS ADVISED TO ASK HER PRIMARY CARE PROVIDER TO REFILL THIS.    PATIENT IS REQUESTING TO KNOW IF [facility] MAY REFILL THIS PRESCRIPTION AT LEAST UNTIL 01- WHEN SHE WILL SEE A NEW GASTROENTEROLOGIST DR MACE.    PATIENT STATED SHE HAS ABOUT THREE TO FOUR TABLETS REMAINING.    PLEASE CALL TO ADVISE.    Does the patient have less than a 3 day supply:  [] Yes  [x] No    Would you like a call back once the refill request has been completed: [x] Yes [] No    If the office needs to give you a call back, can they leave a voicemail: [x] Yes [] No    Lyudmila Torre Rep   11/08/24 15:09 EST

## 2024-11-11 ENCOUNTER — TELEPHONE (OUTPATIENT)
Dept: FAMILY MEDICINE CLINIC | Facility: CLINIC | Age: 84
End: 2024-11-11

## 2024-11-11 ENCOUNTER — OFFICE VISIT (OUTPATIENT)
Dept: FAMILY MEDICINE CLINIC | Facility: CLINIC | Age: 84
End: 2024-11-11
Payer: MEDICARE

## 2024-11-11 VITALS
TEMPERATURE: 98 F | HEIGHT: 65 IN | OXYGEN SATURATION: 97 % | SYSTOLIC BLOOD PRESSURE: 126 MMHG | HEART RATE: 71 BPM | BODY MASS INDEX: 26.06 KG/M2 | DIASTOLIC BLOOD PRESSURE: 80 MMHG | WEIGHT: 156.4 LBS

## 2024-11-11 DIAGNOSIS — I10 PRIMARY HYPERTENSION: Primary | ICD-10-CM

## 2024-11-11 DIAGNOSIS — K58.0 IRRITABLE BOWEL SYNDROME WITH DIARRHEA: ICD-10-CM

## 2024-11-11 PROCEDURE — 1126F AMNT PAIN NOTED NONE PRSNT: CPT | Performed by: NURSE PRACTITIONER

## 2024-11-11 PROCEDURE — 3074F SYST BP LT 130 MM HG: CPT | Performed by: NURSE PRACTITIONER

## 2024-11-11 PROCEDURE — 99213 OFFICE O/P EST LOW 20 MIN: CPT | Performed by: NURSE PRACTITIONER

## 2024-11-11 PROCEDURE — 3079F DIAST BP 80-89 MM HG: CPT | Performed by: NURSE PRACTITIONER

## 2024-11-11 RX ORDER — MONTELUKAST SODIUM 4 MG/1
2 TABLET, CHEWABLE ORAL DAILY
Qty: 90 TABLET | Refills: 1 | OUTPATIENT
Start: 2024-11-11

## 2024-11-11 RX ORDER — MONTELUKAST SODIUM 4 MG/1
2 TABLET, CHEWABLE ORAL DAILY
Qty: 180 TABLET | Refills: 0 | Status: SHIPPED | OUTPATIENT
Start: 2024-11-11

## 2024-11-11 NOTE — TELEPHONE ENCOUNTER
REFUSED     Rx Refill Note  Requested Prescriptions     Pending Prescriptions Disp Refills    colestipol (COLESTID) 1 g tablet 90 tablet 1     Sig: Take 2 tablets by mouth Daily.      Last office visit with prescribing clinician: 9/10/2024   Last telemedicine visit with prescribing clinician: Visit date not found   Next office visit with prescribing clinician: Visit date not found                         Would you like a call back once the refill request has been completed: [] Yes [] No    If the office needs to give you a call back, can they leave a voicemail: [] Yes [] No    Alex Parisi MA  11/11/24, 08:27 EST

## 2024-11-11 NOTE — TELEPHONE ENCOUNTER
Caller: Navi Kinney    Relationship: Self    Best call back number: [phone]     Requested Prescriptions:     colestipol (COLESTID) 1 g tablet     Requested Prescriptions      No prescriptions requested or ordered in this encounter        Pharmacy where request should be sent:        [facility], [place] - [address] [facility] - [phone]  - [phone]  [phone]        Last office visit with prescribing clinician: 9/10/2024   Last telemedicine visit with prescribing clinician: Visit date not found   Next office visit with prescribing clinician: Visit date not found     Additional details provided by patient: PATIENT IS CALLING TO STATE SHE HAS AN APPOINTMENT WITH THE DR WHO PRESCRIBED THE ABOVE MEDICATION BUT SHE WENT TO [place] FOR A FEW YEARS AND STAYED WITH HER DAUGHTER, AND THE DR WILL NOT PRESCRIBE UNTIL SHE IS SEEN IN JANUARY,  SHE IS WANTING TO KNOW IF [facility] WOULD BE WILLING TO PRESCRIBE UNTIL SHE SEES THE GASTROENTEROLOGIST.  SHE STATES SHE CAN NOT GO WITHOUT THE MEDICATION UNTIL SHE GETS IN WITH THE NEW DR.  SHE STATES SHE TOOK THE  LAST OF THE MEDICATION YESTERDAY.    Does the patient have less than a 3 day supply:  [x] Yes  [] No    Would you like a call back once the refill request has been completed: [] Yes [] No    If the office needs to give you a call back, can they leave a voicemail: [] Yes [] No    Lyudmila Angel Rep   11/11/24 08:35 EST     PLEASE ADVISE.

## 2024-11-11 NOTE — TELEPHONE ENCOUNTER
Spoke with patient to inform her that she would need to schedule an appointment for her medication request. Patient voiced understanding and scheduled an appointment.

## 2024-11-11 NOTE — ASSESSMENT & PLAN NOTE
Hypertension is stable and controlled  Continue current treatment regimen.  Blood pressure will be reassessed in 6 months.

## 2024-11-11 NOTE — PROGRESS NOTES
Chief Complaint  Med Refill and Irritable Bowel Syndrome    Subjective        HPI   Navi presents to [facility] a an 83 year old female for medication refill and follow up on IBS   She is out of her Colestipol and has been having some  c/o abdominal cramping in the lower region and increased diarrhea.     She denies any fever or chills at this time. Hx of appendectomy, hernia repairs with mesh, and cholecystectomy.     Patient currently takes 2 tablets by mouth daily/Colestipol 1 GM for her history of diarrhea/IBS.      She is not julio to get back in with her Gi until after the first of the year, she did not know she was out of refills.  She is needing refills until she can get back in to that specialist  Has worked well for her in there past    Hypertension-has been well-controlled on current medication.  She takes her medication as directed and denies any side effects.  No increase or changes in headaches no blurred vision no dizziness no flushing no chest pain or pressure    She has no other acute C/o today    The following portions of the patient's history were reviewed and updated as appropriate: allergies, current medications, past family history, past medical history, past social history, past surgical history, and problem list      Review of Systems   Constitutional:  Negative for chills, fatigue and fever.   Eyes:  Negative for visual disturbance.   Respiratory:  Negative for cough, shortness of breath and wheezing.    Cardiovascular:  Negative for chest pain and leg swelling.   Gastrointestinal:  Positive for abdominal pain and diarrhea. Negative for abdominal distention, anal bleeding, blood in stool, constipation, nausea, rectal pain and vomiting.   Neurological:  Negative for dizziness.   Psychiatric/Behavioral:  Negative for self-injury, sleep disturbance and suicidal ideas. The patient is not nervous/anxious.         Objective   Vital Signs:   Vitals:    11/11/24 1613  "  BP: 126/80   Pulse: 71   Temp: 98 °F (36.7 °C)   SpO2: 97%   Weight: 70.9 kg (156 lb 6.4 oz)   Height: 165.1 cm (65\")   PainSc: 0-No pain            9/10/2024     1:14 PM   PHQ-2/PHQ-9 Depression Screening   Retired Little Interest or Pleasure in Doing Things 0-->not at all   Retired Feeling Down, Depressed or Hopeless 1-->several days   Retired PHQ-9: Brief Depression Severity Measure Score 1                 Physical Exam  Vitals reviewed.   Constitutional:       General: She is not in acute distress.  Eyes:      Conjunctiva/sclera: Conjunctivae normal.   Neck:      Thyroid: No thyromegaly.      Vascular: No carotid bruit.   Cardiovascular:      Rate and Rhythm: Normal rate and regular rhythm.      Heart sounds: Normal heart sounds. No murmur heard.  Pulmonary:      Effort: Pulmonary effort is normal. No respiratory distress.      Breath sounds: Normal breath sounds. No stridor. No wheezing, rhonchi or rales.   Chest:      Chest wall: No tenderness.   Abdominal:      General: Abdomen is flat. Bowel sounds are normal. There is no distension.      Palpations: Abdomen is soft. There is no mass.      Tenderness: There is no abdominal tenderness. There is no right CVA tenderness, left CVA tenderness, guarding or rebound.      Hernia: No hernia is present.   Lymphadenopathy:      Cervical: No cervical adenopathy.   Neurological:      Mental Status: She is alert and oriented to person, place, and time.   Psychiatric:         Attention and Perception: Attention normal.         Mood and Affect: Mood normal.          Result Review :     The following data was reviewed by: CORNELL Buckner on 11/11/2024:           Assessment and Plan       Primary hypertension  Hypertension is stable and controlled  Continue current treatment regimen.  Blood pressure will be reassessed in 6 months.         Irritable bowel syndrome with diarrhea  Refill colestipol until able to get into GI  Worked well in the past out of " medication    Follow-up with any worsening symptoms             Follow Up   Return if symptoms worsen or fail to improve, for Follow up with PCP as Directed.  Patient was given instructions and counseling regarding her condition or for health maintenance advice. Please see specific information pulled into the AVS if appropriate.

## 2024-11-11 NOTE — ASSESSMENT & PLAN NOTE
Refill colestipol until able to get into GI  Worked well in the past out of medication    Follow-up with any worsening symptoms

## 2024-11-19 ENCOUNTER — OFFICE VISIT (OUTPATIENT)
Dept: ORTHOPEDIC SURGERY | Facility: CLINIC | Age: 84
End: 2024-11-19
Payer: MEDICARE

## 2024-11-19 VITALS — HEIGHT: 64 IN | BODY MASS INDEX: 26.09 KG/M2 | WEIGHT: 152.8 LBS | TEMPERATURE: 98.7 F

## 2024-11-19 DIAGNOSIS — R52 PAIN: Primary | ICD-10-CM

## 2024-11-19 DIAGNOSIS — M17.0 PRIMARY OSTEOARTHRITIS OF BOTH KNEES: ICD-10-CM

## 2024-11-19 PROCEDURE — 99214 OFFICE O/P EST MOD 30 MIN: CPT | Performed by: NURSE PRACTITIONER

## 2024-11-19 PROCEDURE — 20610 DRAIN/INJ JOINT/BURSA W/O US: CPT | Performed by: NURSE PRACTITIONER

## 2024-11-19 PROCEDURE — 73562 X-RAY EXAM OF KNEE 3: CPT | Performed by: NURSE PRACTITIONER

## 2024-11-19 PROCEDURE — 1159F MED LIST DOCD IN RCRD: CPT | Performed by: NURSE PRACTITIONER

## 2024-11-19 PROCEDURE — 1160F RVW MEDS BY RX/DR IN RCRD: CPT | Performed by: NURSE PRACTITIONER

## 2024-11-19 RX ORDER — MELOXICAM 15 MG/1
7.5 TABLET ORAL DAILY PRN
Qty: 14 TABLET | Refills: 2 | Status: SHIPPED | OUTPATIENT
Start: 2024-11-19

## 2024-11-19 RX ORDER — METHYLPREDNISOLONE ACETATE 80 MG/ML
80 INJECTION, SUSPENSION INTRA-ARTICULAR; INTRALESIONAL; INTRAMUSCULAR; SOFT TISSUE
Status: COMPLETED | OUTPATIENT
Start: 2024-11-19 | End: 2024-11-19

## 2024-11-19 RX ADMIN — METHYLPREDNISOLONE ACETATE 80 MG: 80 INJECTION, SUSPENSION INTRA-ARTICULAR; INTRALESIONAL; INTRAMUSCULAR; SOFT TISSUE at 16:17

## 2024-11-19 RX ADMIN — METHYLPREDNISOLONE ACETATE 80 MG: 80 INJECTION, SUSPENSION INTRA-ARTICULAR; INTRALESIONAL; INTRAMUSCULAR; SOFT TISSUE at 16:18

## 2024-11-19 NOTE — PROGRESS NOTES
Patient: Navi Kinney  YOB: 1940 83 y.o. female  Medical Record Number: [id number]    Chief Complaints:   Chief Complaint   Patient presents with   • Right Knee - Initial Evaluation   • Left Knee - Initial Evaluation       History of Present Illness:Navi Kinney is a 83 y.o. female who presents with complaints of worsening in bilateral knee pain.  Patient reports she has had chronic pain in her knees usually left is worse than the right but about a month ago her right knee started giving out, she has had increased pain.  She has not had any recent injections or recent physical therapy.    Allergies:   Allergies   Allergen Reactions   • Sulfadiazine Unknown - Low Severity     Pt unsure if she has an allergy to this       Medications:   Current Outpatient Medications   Medication Sig Dispense Refill   • aspirin 81 MG chewable tablet Chew 1 tablet Daily.     • atorvastatin (LIPITOR) 20 MG tablet Take 1 tablet by mouth Daily.     • benazepril (LOTENSIN) 20 MG tablet Take 1 tablet by mouth Daily. 90 tablet 2   • colestipol (COLESTID) 1 g tablet Take 2 tablets by mouth Daily. 180 tablet 0   • Cyanocobalamin (VITAMIN B-12 PO) Take 500 mcg by mouth Every Night.     • DULoxetine (CYMBALTA) 60 MG capsule Take 1 capsule by mouth Every Morning. 90 capsule 2   • Farxiga 10 MG tablet Take 10 mg by mouth Every Morning Before Breakfast. 90 tablet 2   • metoprolol succinate XL (TOPROL-XL) 50 MG 24 hr tablet TAKE ONE TABLET BY MOUTH EVERY NIGHT 90 tablet 1   • mupirocin (BACTROBAN) 2 % ointment Apply 1 application topically to the appropriate area as directed 3 (Three) Times a Day. APPLY TO AFFECTED AREA 15 g 0     No current facility-administered medications for this visit.         The following portions of the patient's history were reviewed and updated as appropriate: allergies, current medications, past family history, past medical history, past social history, past surgical history and problem list.    Review  "of Systems:   14 point review of systems was performed. All systems negative except pertinent positives/negatives listed in HPI above    Physical Exam:   Vitals:    11/19/24 1520   Temp: 98.7 °F (37.1 °C)   TempSrc: Temporal   Weight: 69.3 kg (152 lb 12.8 oz)   Height: 162.6 cm (64\")   PainSc: 0-No pain   PainLoc: Knee       General: A and O x 3, ASA, NAD    Skin clear no unusual lesions noted  Bilateral knees patient has trace amount of effusion noted right knee none on the left with 116 degrees flexion neutral in extension positive Jada negative Lockman calf soft and nontender      Radiology:  Xrays 3views (ap,lateral, sunrise) bilateral knees were ordered and reviewed today secondary to increased pain and show significant osteoarthritis bilaterally particularly involving the patellofemoral compartment in which the patient has bone-on-bone end-stage osteoarthritis.  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    Patient and I discussed options, she would like to proceed with bilateral knee cortisone injections, physical therapy, prescription given for meloxicam to take as needed and I will see the patient back for follow-up in 3 to 4 months if needed    Large Joint Arthrocentesis: R knee  Date/Time: 11/19/2024 4:17 PM  Consent given by: patient  Site marked: site marked  Timeout: Immediately prior to procedure a time out was called to verify the correct patient, procedure, equipment, support staff and site/side marked as required   Supporting Documentation  Indications: pain and joint swelling   Procedure Details  Location: knee - R knee  Preparation: Patient was prepped and draped in the usual sterile fashion  Needle size: 22 G  Approach: anterolateral  Medications administered: 80 mg methylPREDNISolone acetate 80 MG/ML; 2 mL lidocaine (cardiac)  Patient tolerance: patient tolerated the procedure well with no immediate complications      Large " Joint Arthrocentesis: L knee  Date/Time: 11/19/2024 4:18 PM  Consent given by: patient  Site marked: site marked  Timeout: Immediately prior to procedure a time out was called to verify the correct patient, procedure, equipment, support staff and site/side marked as required   Supporting Documentation  Indications: pain and joint swelling   Procedure Details  Location: knee - L knee  Preparation: Patient was prepped and draped in the usual sterile fashion  Needle size: 22 G  Approach: anterolateral  Medications administered: 80 mg methylPREDNISolone acetate 80 MG/ML; 2 mL lidocaine (cardiac)  Patient tolerance: patient tolerated the procedure well with no immediate complications           Lyla Jones, APRN  11/19/2024

## 2024-12-16 ENCOUNTER — TELEPHONE (OUTPATIENT)
Dept: FAMILY MEDICINE CLINIC | Facility: CLINIC | Age: 84
End: 2024-12-16
Payer: MEDICARE

## 2024-12-16 RX ORDER — DAPAGLIFLOZIN 10 MG/1
10 TABLET, FILM COATED ORAL DAILY
Qty: 90 TABLET | Refills: 2 | Status: SHIPPED | OUTPATIENT
Start: 2024-12-16

## 2024-12-16 NOTE — TELEPHONE ENCOUNTER
Caller: Navi Kinney    Relationship: Self    Best call back number:   Telephone Information:   Mobile [phone]     Which medication are you concerned about: Farxiga 10 MG tablet     Who prescribed you this medication: CORNELL NAVARRETE IS NOW,SOMEONE ELSE DID PRIOR BUT PATIENT NOT SHAHLA    What are your concerns: PATIENT STATED THAT IT COSTS HER $55 TO GET THE FARXIGA, SO SHE ASKED HER PHARMACY ABOUT A GENERIC FORM AND THEY TOLD HER THEY DO CARRY A GENERIC FORM BUT HER INSURANCE WOULD NOT COVER IT. PHARMACY INSTRUCTED PATIENT TO CONTACT PCP TO POSSIBLY DO A PRIOR AUTHORIZATION IN ORDER  TO GET IT COVERED. PATIENT STATED THAT HER OTHER MEDICATION ARE $2-$3 SO SHE IS JUST TRYING TO LOWER THE COST TO BE MORE AFFORDABLE FOR HER.  IF NOT ABLE TO GET FARXIGA COVERED PATIENT IS WANTING TO KNOW IF THERE IS ANOTHER MEDICATION THAT CAN REPLACE IT.PLEASE CALLBACK AND ADVISE.    PREFERRED PHARMACY:[facility] - [place], [place] - [address] [facility] - [phone]  - [phone]  [phone]

## 2025-02-07 RX ORDER — COLESTIPOL HYDROCHLORIDE 1 G/1
2 TABLET ORAL DAILY
Qty: 180 TABLET | Refills: 0 | Status: SHIPPED | OUTPATIENT
Start: 2025-02-07

## 2025-02-07 NOTE — TELEPHONE ENCOUNTER
Last visit: 11-11-24  Next visit:   Return if symptoms worsen or fail to improve, for Follow up with PCP as Directed.

## 2025-02-21 ENCOUNTER — CLINICAL SUPPORT (OUTPATIENT)
Dept: ORTHOPEDIC SURGERY | Facility: CLINIC | Age: 85
End: 2025-02-21
Payer: MEDICARE

## 2025-02-21 VITALS — HEIGHT: 64 IN | BODY MASS INDEX: 26.15 KG/M2 | TEMPERATURE: 98.2 F | WEIGHT: 153.2 LBS

## 2025-02-21 DIAGNOSIS — M17.0 PRIMARY OSTEOARTHRITIS OF BOTH KNEES: Primary | ICD-10-CM

## 2025-02-21 RX ORDER — METHYLPREDNISOLONE ACETATE 80 MG/ML
80 INJECTION, SUSPENSION INTRA-ARTICULAR; INTRALESIONAL; INTRAMUSCULAR; SOFT TISSUE
Status: COMPLETED | OUTPATIENT
Start: 2025-02-21 | End: 2025-02-21

## 2025-02-21 RX ORDER — ATORVASTATIN CALCIUM 40 MG/1
40 TABLET, FILM COATED ORAL
COMMUNITY
Start: 2025-02-07

## 2025-02-21 RX ORDER — ISOSORBIDE MONONITRATE 60 MG/1
1 TABLET, EXTENDED RELEASE ORAL DAILY
COMMUNITY
Start: 2025-02-07

## 2025-02-21 RX ORDER — CLOBETASOL PROPIONATE 0.5 MG/ML
SOLUTION TOPICAL
COMMUNITY
Start: 2025-01-23

## 2025-02-21 RX ADMIN — METHYLPREDNISOLONE ACETATE 80 MG: 80 INJECTION, SUSPENSION INTRA-ARTICULAR; INTRALESIONAL; INTRAMUSCULAR; SOFT TISSUE at 13:49

## 2025-02-21 RX ADMIN — METHYLPREDNISOLONE ACETATE 80 MG: 80 INJECTION, SUSPENSION INTRA-ARTICULAR; INTRALESIONAL; INTRAMUSCULAR; SOFT TISSUE at 13:48

## 2025-02-21 NOTE — PROGRESS NOTES
2/21/2025    Navi Kinney is here today for worsening knee pain. Pt has undergone injection of the knee in the past with good resolution of symptoms. Pt is requesting a repeat injection.     KNEE Injection Procedure Note:    Large Joint Arthrocentesis: R knee  Date/Time: 2/21/2025 1:48 PM  Consent given by: patient  Site marked: site marked  Timeout: Immediately prior to procedure a time out was called to verify the correct patient, procedure, equipment, support staff and site/side marked as required   Supporting Documentation  Indications: pain and joint swelling   Procedure Details  Location: knee - R knee  Preparation: Patient was prepped and draped in the usual sterile fashion  Needle size: 22 G  Approach: anterolateral  Medications administered: 80 mg methylPREDNISolone acetate 80 MG/ML; 2 mL lidocaine (cardiac)  Patient tolerance: patient tolerated the procedure well with no immediate complications      Large Joint Arthrocentesis: L knee  Date/Time: 2/21/2025 1:49 PM  Consent given by: patient  Site marked: site marked  Timeout: Immediately prior to procedure a time out was called to verify the correct patient, procedure, equipment, support staff and site/side marked as required   Supporting Documentation  Indications: pain and joint swelling   Procedure Details  Location: knee - L knee  Preparation: Patient was prepped and draped in the usual sterile fashion  Needle size: 22 G  Approach: anterolateral  Medications administered: 80 mg methylPREDNISolone acetate 80 MG/ML; 2 mL lidocaine (cardiac)  Patient tolerance: patient tolerated the procedure well with no immediate complications      At the conclusion of the injection I discussed the importance of continued quad strengthening exercises on a daily basis. I will see the patient back if the symptoms should fail to improve or worsen.    Lyla Jones, APRN  2/21/2025

## 2025-03-21 ENCOUNTER — OFFICE VISIT (OUTPATIENT)
Dept: FAMILY MEDICINE CLINIC | Facility: CLINIC | Age: 85
End: 2025-03-21
Payer: MEDICARE

## 2025-03-21 VITALS
DIASTOLIC BLOOD PRESSURE: 90 MMHG | OXYGEN SATURATION: 96 % | WEIGHT: 156.4 LBS | BODY MASS INDEX: 26.7 KG/M2 | HEART RATE: 70 BPM | TEMPERATURE: 94.9 F | HEIGHT: 64 IN | SYSTOLIC BLOOD PRESSURE: 140 MMHG

## 2025-03-21 DIAGNOSIS — M17.0 PRIMARY OSTEOARTHRITIS OF BOTH KNEES: ICD-10-CM

## 2025-03-21 DIAGNOSIS — N18.31 STAGE 3A CHRONIC KIDNEY DISEASE: ICD-10-CM

## 2025-03-21 DIAGNOSIS — Z95.1 S/P CABG (CORONARY ARTERY BYPASS GRAFT): ICD-10-CM

## 2025-03-21 DIAGNOSIS — M25.511 ACUTE PAIN OF RIGHT SHOULDER: Primary | ICD-10-CM

## 2025-03-21 DIAGNOSIS — E11.9 NON-INSULIN DEPENDENT TYPE 2 DIABETES MELLITUS: Chronic | ICD-10-CM

## 2025-03-21 DIAGNOSIS — R60.1 GENERALIZED EDEMA: ICD-10-CM

## 2025-03-21 DIAGNOSIS — R41.3 MEMORY CHANGES: ICD-10-CM

## 2025-03-21 DIAGNOSIS — I10 PRIMARY HYPERTENSION: Chronic | ICD-10-CM

## 2025-03-21 DIAGNOSIS — E78.2 MIXED HYPERLIPIDEMIA: Chronic | ICD-10-CM

## 2025-03-21 DIAGNOSIS — I65.29 STENOSIS OF CAROTID ARTERY, UNSPECIFIED LATERALITY: ICD-10-CM

## 2025-03-21 DIAGNOSIS — F33.1 MODERATE EPISODE OF RECURRENT MAJOR DEPRESSIVE DISORDER: Chronic | ICD-10-CM

## 2025-03-21 RX ORDER — PREDNISONE 20 MG/1
20 TABLET ORAL 2 TIMES DAILY
Qty: 10 TABLET | Refills: 0 | Status: SHIPPED | OUTPATIENT
Start: 2025-03-21 | End: 2025-03-26

## 2025-03-21 NOTE — ASSESSMENT & PLAN NOTE
Lipid abnormalities are stable    Plan:  Continue same medication/s without change.      Discussed medication dosage, use, side effects, and goals of treatment in detail.    Counseled patient on lifestyle modifications to help control hyperlipidemia.     Patient Treatment Goals:   LDL goal is under 100    Followup in 6 months.    Orders:    Duplex Venous Upper Extremity - Left CAR; Future    Ambulatory Referral to Orthopedic Surgery    XR Shoulder 1 View Right; Future    Vitamin D,25-Hydroxy    Vitamin B12    Folate    T4, Free    Hemoglobin A1c    TSH    CBC and Differential    Lipid panel    Comprehensive metabolic panel

## 2025-03-21 NOTE — ASSESSMENT & PLAN NOTE
Keep follow-up with nephrology    Orders:    Duplex Venous Upper Extremity - Left CAR; Future    Ambulatory Referral to Orthopedic Surgery    XR Shoulder 1 View Right; Future    Vitamin D,25-Hydroxy    Vitamin B12    Folate    T4, Free    Hemoglobin A1c    TSH    CBC and Differential    Lipid panel    Comprehensive metabolic panel

## 2025-03-21 NOTE — ASSESSMENT & PLAN NOTE
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 Depression is in full remission and stable.    Plan:   Continue current medication therapy     Followup in 6 months.     Orders:    Duplex Venous Upper Extremity - Left CAR; Future    Ambulatory Referral to Orthopedic Surgery    XR Shoulder 1 View Right; Future    Vitamin D,25-Hydroxy    Vitamin B12    Folate    T4, Free    Hemoglobin A1c    TSH    CBC and Differential    Lipid panel    Comprehensive metabolic panel

## 2025-03-21 NOTE — ASSESSMENT & PLAN NOTE
Please follow-up with cardiology  Orders:    Duplex Venous Upper Extremity - Left CAR; Future    Ambulatory Referral to Orthopedic Surgery    XR Shoulder 1 View Right; Future    Vitamin D,25-Hydroxy    Vitamin B12    Folate    T4, Free    Hemoglobin A1c    TSH    CBC and Differential    Lipid panel    Comprehensive metabolic panel

## 2025-03-21 NOTE — ASSESSMENT & PLAN NOTE
Hypertension is stable and controlled  Continue current treatment regimen.  Blood pressure will be reassessed in 6 months.    Orders:    Duplex Venous Upper Extremity - Left CAR; Future    Ambulatory Referral to Orthopedic Surgery    XR Shoulder 1 View Right; Future    Vitamin D,25-Hydroxy    Vitamin B12    Folate    T4, Free    Hemoglobin A1c    TSH    CBC and Differential    Lipid panel    Comprehensive metabolic panel

## 2025-03-21 NOTE — ASSESSMENT & PLAN NOTE
Keep follow-up with cardiology  Orders:    Duplex Venous Upper Extremity - Left CAR; Future    Ambulatory Referral to Orthopedic Surgery    XR Shoulder 1 View Right; Future    Vitamin D,25-Hydroxy    Vitamin B12    Folate    T4, Free    Hemoglobin A1c    TSH    CBC and Differential    Lipid panel    Comprehensive metabolic panel

## 2025-03-21 NOTE — ASSESSMENT & PLAN NOTE
Follow-up with Ortho as directed  Orders:    Duplex Venous Upper Extremity - Left CAR; Future    Ambulatory Referral to Orthopedic Surgery    XR Shoulder 1 View Right; Future    Vitamin D,25-Hydroxy    Vitamin B12    Folate    T4, Free    Hemoglobin A1c    TSH    CBC and Differential    Lipid panel    Comprehensive metabolic panel

## 2025-03-21 NOTE — ASSESSMENT & PLAN NOTE
Needs better assessment checking hemoglobin A1c    Orders:    Duplex Venous Upper Extremity - Left CAR; Future    Ambulatory Referral to Orthopedic Surgery    XR Shoulder 1 View Right; Future    Vitamin D,25-Hydroxy    Vitamin B12    Folate    T4, Free    Hemoglobin A1c    TSH    CBC and Differential    Lipid panel    Comprehensive metabolic panel

## 2025-03-21 NOTE — PROGRESS NOTES
Chief Complaint  Arm Pain (right), Hypertension, Hyperlipidemia, and Diabetes    Subjective        Arm Pain   Pertinent negatives include no chest pain.      Navi presents to [facility] as an 84 year old female follow-up on chronic conditions, to order labs and to discuss some ongoing right arm swelling, pain, numbness and tingling that has been getting worse over the last few weeks    Hypertension-has been well-controlled on current medication.  She takes her medication as directed and denies any side effects.  No increase or changes in headaches no blurred vision no dizziness no flushing no chest pain or pressure.    Hyperlipidemia-currently taking atorvastatin 20 mg p.o. daily.  She is tolerating this medication well with no muscle weakness or myalgias     Type 2 diabetes   Since the last visit, she has overall felt well.  she has been compliant with current meds.  she denies medication side effects.  Glucose control with medication is needs further observation   The last HgbA1C result was   Lab Results   Component Value Date    HGBA1C 6.2 (H) 12/31/2023   .  The last dilated eye exam was 2024  .Denies any polys, no open ulcerations or changes in numbness and tingling in her feet but is having the tingling only in her right arm  States that her PCP while living in [place] did DC her metformin and she was taking glipizide at 1 time but that has also been DC'd.   Denies any medication side effects.  She does occasionally check her blood sugar in the morning states that it usually runs around 140  She does have upcoming labs with nephrology     She has been having some increased memory changes.  She did have an episode in December that caused her to have some strokelike symptoms and she did lose vision temporarily on her right side.  States that she did undergo some testing and an MRI it was all negative.  She has had some increase stress since moving to [place].  She is very  "concerned about forgetting names and words more often.  She has seen neurology and does have follow-up appointment scheduled    Her main complaint today is some right shoulder pain, some swelling intermittently at her right arm/forearm and some numbness and tingling.  States that it is worse at night when she lays down in bed  States that she has had some problems with her shoulder in the past has never seen Ortho.  She has pain when raising above 90 degrees or any lifting or twisting  She has tried over-the-counter Tylenol/NSAIDs and alternated ice and heat    She has seen Ortho in the past for osteoarthritis of the knees.  She would like to be referred to same office         She does see specialist.  Cardiology, GI and nephrology.  She does have upcoming appointments    No other acute C/o today    The following portions of the patient's history were reviewed and updated as appropriate: allergies, current medications, past family history, past medical history, past social history, past surgical history, and problem list       Review of Systems   Constitutional:  Negative for chills, fatigue and fever.   Eyes:  Negative for visual disturbance.   Respiratory:  Negative for cough, shortness of breath and wheezing.    Cardiovascular:  Negative for chest pain and leg swelling.   Skin:  Positive for rash.   Neurological:  Negative for dizziness.   Psychiatric/Behavioral:  Negative for self-injury, sleep disturbance and suicidal ideas. The patient is not nervous/anxious.         Objective   Vital Signs:   Vitals:    03/21/25 1130   BP: 140/90   Pulse: 70   Temp: 94.9 °F (34.9 °C)   SpO2: 96%   Weight: 70.9 kg (156 lb 6.4 oz)   Height: 162.6 cm (64.02\")   PainSc: 0-No pain            3/21/2025    11:31 AM   PHQ-2/PHQ-9 Depression Screening   Little interest or pleasure in doing things Not at all   Feeling down, depressed, or hopeless Not at all   How difficult have these problems made it for you to do your work, take care " of things at home, or get along with other people? Not difficult at all                 Physical Exam  Vitals reviewed.   Constitutional:       General: She is not in acute distress.  Eyes:      Conjunctiva/sclera: Conjunctivae normal.   Neck:      Thyroid: No thyromegaly.      Vascular: No carotid bruit.   Cardiovascular:      Rate and Rhythm: Normal rate and regular rhythm.      Heart sounds: Normal heart sounds.   Pulmonary:      Effort: Pulmonary effort is normal.      Breath sounds: Normal breath sounds.   Musculoskeletal:      Right shoulder: No swelling, deformity, effusion, laceration, tenderness, bony tenderness or crepitus. Decreased range of motion. Normal strength. Normal pulse.      Left shoulder: Normal.        Arms:    Neurological:      Mental Status: She is alert.   Psychiatric:         Attention and Perception: Attention normal.         Mood and Affect: Mood normal.          Result Review :     The following data was reviewed by: CORNELL Buckner on 03/21/2025:     Due for labs no recent labs    HEMOGLOBIN A1C (12/31/2023 09:38) 6.2     Assessment and Plan       Acute pain of right shoulder  Venous Doppler per patient request  X-ray ordered, referral to Ortho, checking labs  Orders:    Duplex Venous Upper Extremity - Left CAR; Future    Ambulatory Referral to Orthopedic Surgery    XR Shoulder 1 View Right; Future    Vitamin D,25-Hydroxy    Vitamin B12    Folate    Primary hypertension  Hypertension is stable and controlled  Continue current treatment regimen.  Blood pressure will be reassessed in 6 months.    Orders:    Duplex Venous Upper Extremity - Left CAR; Future    Ambulatory Referral to Orthopedic Surgery    XR Shoulder 1 View Right; Future    Vitamin D,25-Hydroxy    Vitamin B12    Folate    T4, Free    Hemoglobin A1c    TSH    CBC and Differential    Lipid panel    Comprehensive metabolic panel    Primary osteoarthritis of both knees  Follow-up with Ortho as directed  Orders:     Duplex Venous Upper Extremity - Left CAR; Future    Ambulatory Referral to Orthopedic Surgery    XR Shoulder 1 View Right; Future    Vitamin D,25-Hydroxy    Vitamin B12    Folate    T4, Free    Hemoglobin A1c    TSH    CBC and Differential    Lipid panel    Comprehensive metabolic panel    Memory changes  Keep follow-up with neurology  Orders:    T4, Free    Hemoglobin A1c    TSH    CBC and Differential    Lipid panel    Comprehensive metabolic panel    Mixed hyperlipidemia   Lipid abnormalities are stable    Plan:  Continue same medication/s without change.      Discussed medication dosage, use, side effects, and goals of treatment in detail.    Counseled patient on lifestyle modifications to help control hyperlipidemia.     Patient Treatment Goals:   LDL goal is under 100    Followup in 6 months.    Orders:    Duplex Venous Upper Extremity - Left CAR; Future    Ambulatory Referral to Orthopedic Surgery    XR Shoulder 1 View Right; Future    Vitamin D,25-Hydroxy    Vitamin B12    Folate    T4, Free    Hemoglobin A1c    TSH    CBC and Differential    Lipid panel    Comprehensive metabolic panel    Stenosis of carotid artery, unspecified laterality  Keep follow-up with cardiology  Orders:    Duplex Venous Upper Extremity - Left CAR; Future    Ambulatory Referral to Orthopedic Surgery    XR Shoulder 1 View Right; Future    Vitamin D,25-Hydroxy    Vitamin B12    Folate    T4, Free    Hemoglobin A1c    TSH    CBC and Differential    Lipid panel    Comprehensive metabolic panel    S/P CABG (coronary artery bypass graft)  Please follow-up with cardiology  Orders:    Duplex Venous Upper Extremity - Left CAR; Future    Ambulatory Referral to Orthopedic Surgery    XR Shoulder 1 View Right; Future    Vitamin D,25-Hydroxy    Vitamin B12    Folate    T4, Free    Hemoglobin A1c    TSH    CBC and Differential    Lipid panel    Comprehensive metabolic panel    Non-insulin dependent type 2 diabetes mellitus  Needs better assessment  checking hemoglobin A1c    Orders:    Duplex Venous Upper Extremity - Left CAR; Future    Ambulatory Referral to Orthopedic Surgery    XR Shoulder 1 View Right; Future    Vitamin D,25-Hydroxy    Vitamin B12    Folate    T4, Free    Hemoglobin A1c    TSH    CBC and Differential    Lipid panel    Comprehensive metabolic panel    Stage 3a chronic kidney disease  Keep follow-up with nephrology    Orders:    Duplex Venous Upper Extremity - Left CAR; Future    Ambulatory Referral to Orthopedic Surgery    XR Shoulder 1 View Right; Future    Vitamin D,25-Hydroxy    Vitamin B12    Folate    T4, Free    Hemoglobin A1c    TSH    CBC and Differential    Lipid panel    Comprehensive metabolic panel    Moderate episode of recurrent major depressive disorder  Patient's depression is a recurrent episode that is moderate without psychosis. Depression is in full remission and stable.    Plan:   Continue current medication therapy     Followup in 6 months.     Orders:    Duplex Venous Upper Extremity - Left CAR; Future    Ambulatory Referral to Orthopedic Surgery    XR Shoulder 1 View Right; Future    Vitamin D,25-Hydroxy    Vitamin B12    Folate    T4, Free    Hemoglobin A1c    TSH    CBC and Differential    Lipid panel    Comprehensive metabolic panel    Generalized edema  Venous Doppler ordered today  Intermittent no swelling noted in office today  Orders:    Duplex Venous Upper Extremity - Left CAR; Future    To ER with any severe pain, swelling or changes    Follow Up   Return if symptoms worsen or fail to improve, for Follow up with PCP as Directed.  Patient was given instructions and counseling regarding her condition or for health maintenance advice. Please see specific information pulled into the AVS if appropriate.

## 2025-03-31 ENCOUNTER — HOSPITAL ENCOUNTER (OUTPATIENT)
Dept: CARDIOLOGY | Facility: HOSPITAL | Age: 85
Discharge: HOME | End: 2025-03-31
Admitting: NURSE PRACTITIONER
Payer: MEDICARE

## 2025-03-31 ENCOUNTER — RESULTS FOLLOW-UP (OUTPATIENT)
Dept: FAMILY MEDICINE CLINIC | Facility: CLINIC | Age: 85
End: 2025-03-31
Payer: MEDICARE

## 2025-03-31 DIAGNOSIS — N18.31 STAGE 3A CHRONIC KIDNEY DISEASE: ICD-10-CM

## 2025-03-31 DIAGNOSIS — E78.2 MIXED HYPERLIPIDEMIA: Chronic | ICD-10-CM

## 2025-03-31 DIAGNOSIS — M17.0 PRIMARY OSTEOARTHRITIS OF BOTH KNEES: ICD-10-CM

## 2025-03-31 DIAGNOSIS — Z95.1 S/P CABG (CORONARY ARTERY BYPASS GRAFT): ICD-10-CM

## 2025-03-31 DIAGNOSIS — R60.1 GENERALIZED EDEMA: ICD-10-CM

## 2025-03-31 DIAGNOSIS — F33.1 MODERATE EPISODE OF RECURRENT MAJOR DEPRESSIVE DISORDER: Chronic | ICD-10-CM

## 2025-03-31 DIAGNOSIS — E11.9 NON-INSULIN DEPENDENT TYPE 2 DIABETES MELLITUS: Chronic | ICD-10-CM

## 2025-03-31 DIAGNOSIS — I10 PRIMARY HYPERTENSION: Chronic | ICD-10-CM

## 2025-03-31 DIAGNOSIS — M25.511 ACUTE PAIN OF RIGHT SHOULDER: ICD-10-CM

## 2025-03-31 DIAGNOSIS — I65.29 STENOSIS OF CAROTID ARTERY, UNSPECIFIED LATERALITY: ICD-10-CM

## 2025-03-31 LAB
BH CV UPPER VENOUS LEFT INTERNAL JUGULAR AUGMENT: NORMAL
BH CV UPPER VENOUS LEFT INTERNAL JUGULAR COMPRESS: NORMAL
BH CV UPPER VENOUS LEFT INTERNAL JUGULAR PHASIC: NORMAL
BH CV UPPER VENOUS LEFT INTERNAL JUGULAR SPONT: NORMAL
BH CV UPPER VENOUS LEFT SUBCLAVIAN AUGMENT: NORMAL
BH CV UPPER VENOUS LEFT SUBCLAVIAN COMPRESS: NORMAL
BH CV UPPER VENOUS LEFT SUBCLAVIAN PHASIC: NORMAL
BH CV UPPER VENOUS LEFT SUBCLAVIAN SPONT: NORMAL
BH CV UPPER VENOUS RIGHT AXILLARY AUGMENT: NORMAL
BH CV UPPER VENOUS RIGHT AXILLARY COMPRESS: NORMAL
BH CV UPPER VENOUS RIGHT AXILLARY PHASIC: NORMAL
BH CV UPPER VENOUS RIGHT AXILLARY SPONT: NORMAL
BH CV UPPER VENOUS RIGHT BASILIC FOREARM COMPRESS: NORMAL
BH CV UPPER VENOUS RIGHT BASILIC UPPER COMPRESS: NORMAL
BH CV UPPER VENOUS RIGHT BRACHIAL COMPRESS: NORMAL
BH CV UPPER VENOUS RIGHT CEPHALIC FOREARM COMPRESS: NORMAL
BH CV UPPER VENOUS RIGHT CEPHALIC UPPER COMPRESS: NORMAL
BH CV UPPER VENOUS RIGHT INTERNAL JUGULAR AUGMENT: NORMAL
BH CV UPPER VENOUS RIGHT INTERNAL JUGULAR COMPRESS: NORMAL
BH CV UPPER VENOUS RIGHT INTERNAL JUGULAR PHASIC: NORMAL
BH CV UPPER VENOUS RIGHT INTERNAL JUGULAR SPONT: NORMAL
BH CV UPPER VENOUS RIGHT RADIAL COMPRESS: NORMAL
BH CV UPPER VENOUS RIGHT SUBCLAVIAN AUGMENT: NORMAL
BH CV UPPER VENOUS RIGHT SUBCLAVIAN COMPRESS: NORMAL
BH CV UPPER VENOUS RIGHT SUBCLAVIAN PHASIC: NORMAL
BH CV UPPER VENOUS RIGHT SUBCLAVIAN SPONT: NORMAL
BH CV UPPER VENOUS RIGHT ULNAR COMPRESS: NORMAL
BH CV VAS PRELIMINARY FINDINGS SCRIPTING: 1

## 2025-03-31 PROCEDURE — 93971 EXTREMITY STUDY: CPT

## 2025-03-31 PROCEDURE — 93971 EXTREMITY STUDY: CPT | Performed by: SURGERY

## 2025-03-31 NOTE — LETTER
Navi Kinney  [address] [address]    April 2, 2025     Dear Ms. Kinney:    Below are the results from your recent visit:    Resulted Orders   Duplex Venous Upper Extremity - Right CAR   Result Value Ref Range    Right Internal Jugular Spont Y     Right Internal Jugular Phasic Y     Right Internal Jugular Compress C     Right Internal Jugular Augment Y     Right Subclavian Spont Y     Right Subclavian Phasic Y     Right Subclavian Compress C     Right Subclavian Augment Y     Right Axillary Spont Y     Right Axillary Phasic Y     Right Axillary Compress C     Right Axillary Augment Y     Right Brachial Compress C     Right Radial Compress C     Right Ulnar Compress C     Right Basilic Upper Compress C     Right Basilic Forearm Compress C     Right Cephalic Upper Compress C     Right Cephalic Forearm Compress C     Left Internal Jugular Spont Y     Left Internal Jugular Phasic Y     Left Internal Jugular Compress C     Left Internal Jugular Augment Y     Left Subclavian Spont Y     Left Subclavian Phasic Y     Left Subclavian Compress C     Left Subclavian Augment Y     BH CV VAS PRELIMINARY FINDINGS SCRIPTING 1.0     Narrative    •  Normal right upper extremity venous duplex scan.         Normal venous duplex  No blood clot/ DVT noted          If you have any questions or concerns, please don't hesitate to call.         Sincerely,        CORNELL Buckner

## 2025-04-07 RX ORDER — DULOXETIN HYDROCHLORIDE 60 MG/1
60 CAPSULE, DELAYED RELEASE ORAL EVERY MORNING
Qty: 90 CAPSULE | Refills: 2 | OUTPATIENT
Start: 2025-04-07

## 2025-04-07 NOTE — TELEPHONE ENCOUNTER
Rx Refill Note  Requested Prescriptions     Pending Prescriptions Disp Refills    DULoxetine (CYMBALTA) 60 MG capsule [Pharmacy Med Name: duloxetine 60 mg capsule,delayed release] 90 capsule 2     Sig: TAKE ONE CAPSULE BY MOUTH EVERY MORNING      Last filled 9/10/24   90 day supply with 2 refills should not be due until July       Last office visit with prescribing clinician: 3/21/2025   Last telemedicine visit with prescribing clinician: Visit date not found   Next office visit with prescribing clinician: Visit date not found         GENTRY Guerrero(R)  04/07/25, 08:18 EDT

## 2025-04-16 ENCOUNTER — TELEPHONE (OUTPATIENT)
Dept: FAMILY MEDICINE CLINIC | Facility: CLINIC | Age: 85
End: 2025-04-16
Payer: MEDICARE

## 2025-04-18 ENCOUNTER — OFFICE VISIT (OUTPATIENT)
Dept: FAMILY MEDICINE CLINIC | Facility: CLINIC | Age: 85
End: 2025-04-18
Payer: MEDICARE

## 2025-04-18 VITALS
OXYGEN SATURATION: 95 % | DIASTOLIC BLOOD PRESSURE: 88 MMHG | HEART RATE: 94 BPM | TEMPERATURE: 96.8 F | WEIGHT: 153.2 LBS | BODY MASS INDEX: 26.15 KG/M2 | HEIGHT: 64 IN | SYSTOLIC BLOOD PRESSURE: 130 MMHG

## 2025-04-18 DIAGNOSIS — R26.89 BALANCE PROBLEM: ICD-10-CM

## 2025-04-18 DIAGNOSIS — I10 PRIMARY HYPERTENSION: ICD-10-CM

## 2025-04-18 DIAGNOSIS — M17.0 PRIMARY OSTEOARTHRITIS OF BOTH KNEES: ICD-10-CM

## 2025-04-18 DIAGNOSIS — Z09 HOSPITAL DISCHARGE FOLLOW-UP: Primary | ICD-10-CM

## 2025-04-18 NOTE — PROGRESS NOTES
Chief Complaint  Transitional Care Management    Subjective        HPI   Navi presents to [facility] as an 84 year old female for follow up after being in the ER on 4/11/2025 and 4/16/2025    Note from ED 04/11/2025  [facility] ED  Reason for Visit -  Reason Comments   Fall Pt reports falling yesterday and injuring right big toe. Pt denies any LOC or blood thinner use.   Ordered Prescriptions  Prescription Sig Dispense Quantity Refills Last Filled Start Date End Date   meloxicam (MOBIC) 7.5 MG tablet  Take 1 tablet by mouth daily for 10 days. 10 tablet      04/16/2025 04/26/2025   Visit Diagnoses  - documented in this encounter   Visit Diagnoses  Diagnosis   Sprain of great toe, right, initial encounter - Primary        Note from ED 04/16/  [facility] ED    ReasonCommentsArm PainPt reports having pain in her R arm for a couple weeks. Pt had ultrasound for the pain on Wed. Pt reports tonihgt she was unable to sleep due to the pain radaiting to her back.    Medications we ordered in the emergency room   Medications   lidocaine (LIDODERM) 5 % 1 patch (1 patch Transdermal Patch/Ointment Applied 4/11/25 0245)   methocarbamol (ROBAXIN) tablet 500 mg (500 mg Oral Given 4/11/25 0247)     ASSESSMENT & PLAN  -Symptoms are consistent with musculoskeletal pain. Her symptoms resolved with therapy given in the emergency department. Patient encouraged to continue using heat and gentle stretching. She was prescribed medication to help with her symptoms. She is stable for discharge. No emergent pathology identified or suspected at this time.    Patient scapula x-ray reviewed independently. I do not appreciate any evidence of malalignment or fracture, no dislocation  CLINICAL IMPRESSION  ICD-10-CM ICD-9-CM   1. Scapulalgia M89.8X1 733.90             overall she has been doing much better since starting [id number]  She is wearing a boot on her right foot  She is having some slight balance issues  No falls but does  "feel wobbly  No headaches no blurred vision no dizziness no flushing no chest pain or pressure    She is taking all of her medication as directed  She does have follow-up appointment scheduled with both orthopedic specialist for her shoulder and foot in the next couple of weeks    No other acute complaints today            Review of Systems   Constitutional:  Negative for chills, fatigue and fever.   Eyes:  Negative for visual disturbance.   Respiratory:  Negative for cough, shortness of breath and wheezing.    Cardiovascular:  Negative for chest pain and leg swelling.   Musculoskeletal:  Positive for arthralgias.   Neurological:  Negative for dizziness.   Psychiatric/Behavioral:  Negative for self-injury, sleep disturbance and suicidal ideas. The patient is not nervous/anxious.         Objective   Vital Signs:   Vitals:    04/18/25 1503   BP: 130/88   Pulse: 94   Temp: 96.8 °F (36 °C)   SpO2: 95%   Weight: 69.5 kg (153 lb 3.2 oz)   Height: 162.6 cm (64.02\")   PainSc: 0-No pain            3/21/2025    11:31 AM   PHQ-2/PHQ-9 Depression Screening   Little interest or pleasure in doing things Not at all   Feeling down, depressed, or hopeless Not at all   How difficult have these problems made it for you to do your work, take care of things at home, or get along with other people? Not difficult at all                 Physical Exam  Vitals reviewed.   Constitutional:       General: She is not in acute distress.  Eyes:      Conjunctiva/sclera: Conjunctivae normal.   Neck:      Thyroid: No thyromegaly.      Vascular: No carotid bruit.   Cardiovascular:      Rate and Rhythm: Normal rate and regular rhythm.      Heart sounds: Normal heart sounds. No murmur heard.  Pulmonary:      Effort: Pulmonary effort is normal. No respiratory distress.      Breath sounds: Normal breath sounds. No stridor. No wheezing, rhonchi or rales.   Chest:      Chest wall: No tenderness.   Musculoskeletal:      Right shoulder: Decreased range of " motion.      Right foot: Decreased range of motion. Tenderness present.        Legs:    Neurological:      Mental Status: She is alert and oriented to person, place, and time.   Psychiatric:         Attention and Perception: Attention normal.         Mood and Affect: Mood normal.          Result Review :     The following data was reviewed by: CORNELL Buckner on 2025:  XR Scapula Ap & Lat RT (2025 02:41)     Imaging Results - XR Toes Min 2 Vws RT (2025 10:39 AM EDT)  Narrative   2025 11:20 AM EDT   RE[facility]ING [facility] RESULTS [facility] NOT A SUBSTITUTE FOR DISCUSSING THOSE RESULTS WITH YOUR HEALTH CARE PROVIDER.  PLEASE CONTACT YOUR PROVIDER [facility]IA [facility] TO DISCUSS ANY QUESTIONS OR CONCERNS YOU MAY HAVE REGARDING THESE TEST RESULTS.    RADIOLOGY REPORT    FACILITY:  [facility]  UNIT/AGE/GENDER: M.ED  ER      AGE:84 Y          SEX:F  PATIENT NAME/:  NEISHA BURCIAGA JIMENA    1940  UNIT NUMBER:  [id number]  ACCOUNT NUMBER:  [id number]  ACCESSION NUMBER:  [id number]    EXAM: XR TOES MIN 2 VWS RT    DATE:  2025    HISTORY: right toe pain.    COMPARISON: None    FINDINGS: Alignment is normal. Moderate first carpal metacarpal  osteoarthritis.. There is no acute fracture or foreign body.    IMPRESSION:    No acute abnormalities of the right toes.      XR Toes Min 2 Vws RT (2025 10:39)   IMPRESSION:     No acute abnormalities of the right toes.             Assessment and Plan       Hospital discharge follow-up  [facility] ED 2025-  scapulalgia    [facility] ED 2025-  sprain of right great toe       Primary hypertension  Hypertension is stable and controlled  Continue current treatment regimen.  Blood pressure will be reassessed in 6 months.       Continue meloxicam working well  Keep follow-up with Ortho  specialist as scheduled  Report any changes immediately          Balance problem  Would like to be referred to physical therapy  to be evaluated for balance issues  After completion of acute symptoms with Ortho       Primary osteoarthritis of both knees  Continue meloxicam-  working well  Need labs on next visit               Follow Up   Return in about 3 months (around 7/18/2025) for Medicare Wellness, Follow up/ Medication Check.  Patient was given instructions and counseling regarding her condition or for health maintenance advice. Please see specific information pulled into the AVS if appropriate.

## 2025-04-18 NOTE — ASSESSMENT & PLAN NOTE
Hypertension is stable and controlled  Continue current treatment regimen.  Blood pressure will be reassessed in 6 months.       Continue meloxicam working well  Keep follow-up with Ortho  specialist as scheduled  Report any changes immediately

## 2025-04-21 ENCOUNTER — OFFICE VISIT (OUTPATIENT)
Dept: ORTHOPEDIC SURGERY | Facility: CLINIC | Age: 85
End: 2025-04-21
Payer: MEDICARE

## 2025-04-21 VITALS — TEMPERATURE: 98.4 F | HEIGHT: 64 IN | WEIGHT: 154 LBS | BODY MASS INDEX: 26.29 KG/M2

## 2025-04-21 DIAGNOSIS — R52 PAIN: ICD-10-CM

## 2025-04-21 DIAGNOSIS — M79.2 CERVICAL SPINE ARTHRITIS WITH NERVE PAIN: Primary | ICD-10-CM

## 2025-04-21 DIAGNOSIS — M47.812 CERVICAL SPINE ARTHRITIS WITH NERVE PAIN: Primary | ICD-10-CM

## 2025-04-21 PROCEDURE — 1159F MED LIST DOCD IN RCRD: CPT | Performed by: NURSE PRACTITIONER

## 2025-04-21 PROCEDURE — 1160F RVW MEDS BY RX/DR IN RCRD: CPT | Performed by: NURSE PRACTITIONER

## 2025-04-21 PROCEDURE — 72040 X-RAY EXAM NECK SPINE 2-3 VW: CPT | Performed by: NURSE PRACTITIONER

## 2025-04-21 PROCEDURE — 99214 OFFICE O/P EST MOD 30 MIN: CPT | Performed by: NURSE PRACTITIONER

## 2025-04-21 RX ORDER — PREDNISONE 20 MG/1
20 TABLET ORAL 2 TIMES DAILY
Qty: 10 TABLET | Refills: 0 | Status: SHIPPED | OUTPATIENT
Start: 2025-04-21

## 2025-04-21 NOTE — PROGRESS NOTES
[facility]              New Problem      Patient Name: Navi Kinney  : 1940  Primary Care Physician: Christina Olivia APRN        Chief Complaint:  RUE pain, numbness and tingling    HPI:   Navi Kniney is a 84 y.o. year old who presents today for evaluation.  History of Present Illness  The patient presents for evaluation of shoulder pain.    Intermittent episodes of the right arm feeling as though it is asleep, accompanied by itching sensations, are reported. These symptoms predominantly occur at night when she is in a supine position and are less noticeable during periods of activity. An ultrasound was performed to rule out the presence of blood clots, which yielded negative results. Occasional discomfort in the shoulder and upper back is also reported, which she attributes to recent gardening activities involving the use of her right arm. The pain was severe enough to prevent her from lying down comfortably. No radiographic imaging has been performed to date. There is no associated neck pain reported. A steroid medication was previously prescribed, which she took until starting meloxicam, and some of the medication remains.    She had some imaging done at [facility] of the scapula last week- report suggested no abnormalities of the scapula but + for GH and AC arthritis.    Past Medical/Surgical, Social and Family History:  I have reviewed and/or updated pertinent history as noted in the medical record including:  Past Medical History:   Diagnosis Date    Anxiety     Arthritis     Carotid artery stenosis     mild stenosis bilaterally per US    Cholelithiasis     CKD (chronic kidney disease)     Stage III    Coronary artery disease     Depression     Diabetes     Hearing loss 2020    Hyperlipidemia     Hypertension     Irritable bowel syndrome 2020    Neuromuscular disorder     Obesity 2020    Renal insufficiency     Sinus bradycardia     Sleep apnea     does not uses CPAP or  BiPAP    Slow to wake up after anesthesia     Stroke     TIA    Ventral hernia      Past Surgical History:   Procedure Laterality Date    APPENDECTOMY N/A 2020    Procedure: APPENDECTOMY LAPAROSCOPIC;  Surgeon: Guzman Beal MD;  Location: [facility];  Service: General;  Laterality: N/A;    CARDIAC CATHETERIZATION N/A     Dr. Araiza; total RCA, occ SVG to RCA, subtotal small mild Cx, patent LIMA to LAD, MYA to OM; EF normal    CARDIAC SURGERY      CATARACT EXTRACTION W/ INTRAOCULAR LENS IMPLANT Bilateral     Dr. Carlos    CHOLECYSTECTOMY N/A 2008    Dr. Karlie Rabago    COLONOSCOPY N/A 2013    CORONARY ARTERY BYPASS GRAFT N/A 1997    Quadruple CABG-Dr. Henson    HERNIA REPAIR      JOINT REPLACEMENT  2014    TONSILLECTOMY Bilateral     TOTAL HIP ARTHROPLASTY Right 2015    Right anterior approach total hip arthroplasty-Dr. Albert Espino    TOTAL HIP ARTHROPLASTY Left 2014    Left anterior approach total hip arthroplasty-Dr. Albert Espino    VENTRAL/INCISIONAL HERNIA REPAIR N/A 2018    Procedure: open recurrent INCISIONAL HERNIA REPAIR with mesh;  Surgeon: Nereida Almodovar MD;  Location: [facility];  Service:      Social History     Occupational History     Employer: RETIRED   Tobacco Use    Smoking status: Former     Current packs/day: 0.00     Average packs/day: 1 pack/day for 25.0 years (25.0 ttl pk-yrs)     Types: Cigarettes     Start date:      Quit date:      Years since quittin.3     Passive exposure: Past    Smokeless tobacco: Never   Vaping Use    Vaping status: Never Used   Substance and Sexual Activity    Alcohol use: No    Drug use: No    Sexual activity: Never          Allergies:   Allergies   Allergen Reactions    Sulfadiazine Unknown - Low Severity     Pt unsure if she has an allergy to this       Medications:   Home Medications:  Current Outpatient Medications on File Prior to Visit   Medication Sig    aspirin 81 MG  chewable tablet Chew 1 tablet Daily.    atorvastatin (LIPITOR) 40 MG tablet Take 1 tablet by mouth every night at bedtime.    benazepril (LOTENSIN) 20 MG tablet Take 1 tablet by mouth Daily.    clobetasol (TEMOVATE) 0.05 % external solution APPLY TO AFFECTED AREA OF THE SCALP EVERY DAY FOR TWO WEEKS THEN AS NEEDED    colestipol (COLESTID) 1 g tablet TAKE TWO TABLETS BY MOUTH DAILY    Cyanocobalamin (VITAMIN B-12 PO) Take 500 mcg by mouth Every Night.    dapagliflozin Propanediol 10 MG tablet Take 10 mg by mouth Daily.    DULoxetine (CYMBALTA) 60 MG capsule Take 1 capsule by mouth Every Morning.    isosorbide mononitrate (IMDUR) 60 MG 24 hr tablet Take 1 tablet by mouth Daily.    meloxicam (Mobic) 15 MG tablet Take 0.5 tablets by mouth Daily As Needed for Moderate Pain.    metoprolol succinate XL (TOPROL-XL) 50 MG 24 hr tablet TAKE ONE TABLET BY MOUTH EVERY NIGHT    mupirocin (BACTROBAN) 2 % ointment Apply 1 application topically to the appropriate area as directed 3 (Three) Times a Day. APPLY TO AFFECTED AREA     No current facility-administered medications on file prior to visit.         ROS:  ROS negative except as listed in the HPI.    Physical Exam:   84 y.o. female  Body mass index is 26.43 kg/m²., 69.9 kg (154 lb)  Vitals:    04/21/25 1024   Temp: 98.4 °F (36.9 °C)     General: Alert, cooperative, appears well and in no observable distress. Appears stated age and BMI as listed above.  HEENT: Normocephalic, atraumatic on external visual inspection.  CV: No significant peripheral edema.  Respiratory: Normal respiratory effort.  Skin: Warm & well perfused; appropriate skin turgor.  Psych: Appropriate mood & affect.  Neuro: Gross sensation and motor intact in affected extremity/extremities.  Vascular: Peripheral pulses palpable in affected extremity/extremities.   Physical Exam      MSK Exam:  Spine Musculoskeletal Exam    Inspection    Cervical Spine    Cervical spine inspection is normal.    Palpation     Cervical Spine    Tenderness: none    Range of Motion    Cervical Spine       Cervical flexion: chin to chest. Cervical flexion detail: no pain.     Cervical extension: reduced extension (0-25%). Cervical extension detail: no pain.       Right      Lateral bending: reduced bend (0-25%). Lateral bending detail: pain.       Lateral rotation: reduced rotation (0-25%). Lateral rotation detail: pain.       Left      Lateral bending: reduced bend (0-25%). Lateral bending detail: pain.       Lateral rotation: reduced rotation (0-25%). Lateral rotation detail: pain.      Strength    Cervical Spine      Right      Deltoid: 5/5.       Shoulder external rotation: 5/5.       Biceps: 5/5.       Triceps: 5/5.       Wrist extension: 5/5.       Wrist flexion: 5/5.       FDP: 5/5.       Interossei: 5/5.       Left      Deltoid: 5/5.       Shoulder external rotation: 5/5.       Biceps: 5/5.       Triceps: 5/5.       Wrist extension: 5/5.       Wrist flexion: 5/5.       FDP: 5/5.       Interossei: 5/5.     Sensory    Cervical Spine    Cervical spine sensation is normal.    Neurovascular    Cervical Spine    Cervical spine neurovascular exam is normal.    Shoulder exam is within functional limits and painless with AROM. Strength of the cuff is 4+ to 5/5 and not painful with isometric testing.     Radiology:    Results  Imaging   - Ultrasound for blood clots: No abnormalities      The following X-rays were ordered/reviewed today to evaluate the patient's symptoms: Spine: AP and Lateral view of the cervical spine shows Advanced degenerative changes in the spine with spondylosis most significant at C3-C4 and C5-C6, there is anterolisthesis at C6-C7, perhaps additionally subtle anterolisthesis at C2-C3. There are degenerative changes at the facet joints as well. No prior films available for comparison .    Prior scapula images as noted above and in EPIC.    Procedure:   N/A      Misc. Data/Labs: N/A    Assessment & Plan:      ICD-10-CM  ICD-9-CM   1. Cervical spine arthritis with nerve pain  M47.812 720.9    M79.2 729.2   2. Pain  R52 780.96     New Medications Ordered This Visit   Medications    predniSONE (DELTASONE) 20 MG tablet     Sig: Take 1 tablet by mouth 2 (Two) Times a Day.     Dispense:  10 tablet     Refill:  0     Orders Placed This Encounter   Procedures    XR Spine Cervical 2 View    Ambulatory Referral to Physical Therapy for Evaluation & Treatment       Assessment & Plan  1. Cervical arthritis with nerve pain:  Neck x-ray reveals significant arthritis and reduced intervertebral space in several levels, suspect her pain is related to the same +/- nerve root compression causing nerve pain/symptoms. Symptoms include nocturnal arm numbness, tingling, and itching- worse and only noted when supine.    A short course of oral steroids will be prescribed to reduce inflammation. Referral to physical therapy for traction and other interventions to alleviate nerve compression. Tylenol recommended for pain management due to concerns about kidney function with meloxicam. If symptoms persist, an MRI will be ordered to assess the need for potential injections or other treatment. Experimentation with sleeping positions, such as using a neck roll or travel pillow, is advised to provide better neck support and reduce nocturnal symptoms.    Follow-up  Follow-up appointment scheduled in a few weeks. Immediate contact recommended if symptoms worsen or become continuous.    Continue with activity modifications as needed/discussed.  Continue ICE and/or HEAT PRN.  Recommend to continue activity as tolerated, focus on stretching and strengthening of the joint.    Focus on posture and body mechanics to improve/prevent symptoms.   Patient encouraged to call with questions or concerns prior to follow up.  Will discuss with attending as needed.  Consider additional referrals, work up and/or advanced imaging as indicated or if patient fails to respond to  conservative care.    Return in about 4 weeks (around 5/19/2025) for Recheck. If symptoms change call for sooner appt..    Patient or patient representative verbalized consent for the use of Ambient Listening during the visit with  CORNELL Bahena for chart documentation. 4/21/2025  12:45 EDT     CORNELL Hoffman    Dictation software was used to complete a portion or all of this note.

## 2025-04-29 RX ORDER — DULOXETIN HYDROCHLORIDE 60 MG/1
60 CAPSULE, DELAYED RELEASE ORAL EVERY MORNING
Qty: 90 CAPSULE | Refills: 2 | Status: SHIPPED | OUTPATIENT
Start: 2025-04-29

## 2025-04-29 NOTE — TELEPHONE ENCOUNTER
Rx Refill Note  Requested Prescriptions     Pending Prescriptions Disp Refills    DULoxetine (CYMBALTA) 60 MG capsule [Pharmacy Med Name: duloxetine 60 mg capsule,delayed release] 90 capsule 2     Sig: TAKE ONE CAPSULE BY MOUTH EVERY MORNING      Last office visit with prescribing clinician: 4/18/2025       GENTRY Guerrero(R)  04/29/25, 11:02 EDT

## 2025-05-08 ENCOUNTER — TREATMENT (OUTPATIENT)
Dept: PHYSICAL THERAPY | Facility: CLINIC | Age: 85
End: 2025-05-08
Payer: MEDICARE

## 2025-05-08 DIAGNOSIS — M79.2 CERVICAL SPINE ARTHRITIS WITH NERVE PAIN: ICD-10-CM

## 2025-05-08 DIAGNOSIS — M47.812 CERVICAL SPINE ARTHRITIS WITH NERVE PAIN: ICD-10-CM

## 2025-05-08 DIAGNOSIS — M54.2 PAIN, NECK: Primary | ICD-10-CM

## 2025-05-08 DIAGNOSIS — M54.12 RADICULOPATHY, CERVICAL: ICD-10-CM

## 2025-05-08 NOTE — PROGRESS NOTES
Physical Therapy Initial Evaluation and Plan of Care  [facility] [address], [address]  [place] [address]     Patient: Navi Kinney   : 1940  Referring practitioner: Devon Willingham APRN  Date of Initial Visit: 2025  Today's Date: 2025  Patient seen for 1 sessions  PT Clinic location: [address], [address] [place] [address].  [id number]          Visit Diagnoses:    ICD-10-CM ICD-9-CM   1. Pain, neck  M54.2 723.1   2. Cervical spine arthritis with nerve pain  M47.812 720.9    M79.2 729.2   3. Radiculopathy, cervical  M54.12 723.4       Subjective Questionnaire: NDI:, 14%    Subjective Evaluation    History of Present Illness  Mechanism of injury: The patient is a 84 y.o. female presenting to today's evaluation with R UE numbness and tingling. She reports reports tingling and numbness in her R UE. It travels down the front of her arm to her hand. She notes insidious onset a few weeks ago. It was keeping her up at night. She denies pain in R UE. She does get some itching in the arm but denies any skin problems. She started sleeping with a different pillow which has helped some. She also notes pain in her neck when she turns her head. She denies any weakness in the UE. She was evaluated for a blood clot and was negative. She states that she does not notice her symptoms during the day but it really bothers her at night. She also notes occasional pain in the R upper back (points to scapular region). She saw her MD who did and x-ray which showed degeneration. She was also given sterids without relief.       Aggravating factors: worse  Alleviating factors: none  Imaging: x-ray shows OA and degeneration       Occupation: retired  Prior level of function: independent without difficulty    Current Activity/Functional limitations:  She notes difficulty with sleeping, stating that her UE symptoms will wake her up at night. She sleeps  "on her side and alternates between L and R. She has seen some relief with the pillow. She denies any neck stiffness. She denies any difficulty with lifting a gallon of water. She states she avoids lifting heavier than that. She states that all of her activity is normal except for sleeping.         Pain  Current pain ratin  At best pain ratin  At worst pain ratin (when trying to sleep)    Patient Goals  Patient goal: improving sleep, getting rid of symptoms       Medical history: anxiety, OA, stage 3 chronic kidney disease, depression, diabetes, hyperlipidemia, HTN, IBS, TIA in . See chart for further detail.         Objective          Postural Observations    Additional Postural Observation Details  POSTURE: forward head posture, anterior GH translation, slight scapular winging, increased slope of L upper trap/shoulder region;   PALPATION: denies tenderness with palpation of the R GH region, R upper trap, R scapula, neck, and R UE  MEDIAN ULNT: (+) on R  ULNAR ULNT: (-)  SPURLINGS: (-) B     Active Range of Motion   Cervical/Thoracic Spine   Cervical    Flexion: 50 degrees   Extension: 48 degrees   Left lateral flexion: 35 degrees   Right lateral flexion: 30 (increased pain in the R scapular region) degrees   Left rotation: 65 degrees   Right rotation: 65 (\"maybe its number\") degrees     Strength/Myotome Testing     Left Shoulder     Planes of Motion   Flexion: 5   Abduction: 5   External rotation at 0°: 5   Internal rotation at 0°: 5     Right Shoulder     Planes of Motion   Flexion: 4-   Abduction: 4+   External rotation at 0°: 4   Internal rotation at 0°: 5     Left Elbow   Flexion: 5  Extension: 5    Right Elbow   Flexion: 5  Extension: 5          Assessment & Plan       Assessment  Impairments: abnormal or restricted ROM, activity intolerance, impaired physical strength, lacks appropriate home exercise program and pain with function   Assessment details: The patient is a 84 y.o. female who " presents to physical therapy today for R UE numbness and tingling. Upon initial evaluation, the patient demonstrates the following impairments: UE weakness, impaired postural alignment, and positive ULNT for the median nerve.     Due to these impairments, the patient is unable to perform or has difficulty with the following functional tasks: sleeping. The patient would benefit from skilled PT services to address functional limitations and impairments and to improve patient quality of life.      Prognosis: good    Goals  Plan Goals: STG: (to be met in 4 weeks)  1. Pt will be independent and compliant with initial HEP.  2. Pt will be independent with postural corrections in 2 weeks in order to decrease strain on cervical and thoracic spine.   3. Pt will report a 20% improvement in symptoms since starting therapy.  4. Pt will report symptom level at worst <7 when sleeping.   5. Pt will increase cervical flexion AROM to > 60 deg in order to improve function with ADLs.    LT. Pt will be independent with final HEP for self-management of condition by DC.  2. Pt will improve score on NDI to less than 10% by DC.   3. Pt will report a 50% improvement in symptoms by DC in order to allow return to PLOF.  4.  Pt will improve BUE strength to 4+/5 and without neck pain in order to be able to perform heavy household activities such as cleaning and doing laundry.       Plan  Therapy options: will be seen for skilled therapy services  Planned modality interventions: cryotherapy, electrical stimulation/Russian stimulation, TENS, thermotherapy (hydrocollator packs), traction and ultrasound  Planned therapy interventions: body mechanics training, flexibility, functional ROM exercises, home exercise program, joint mobilization, manual therapy, motor coordination training, neuromuscular re-education, postural training, soft tissue mobilization, spinal/joint mobilization, strengthening, stretching and therapeutic  activities  Frequency: 1x week  Duration in weeks: 4  Treatment plan discussed with: patient      Access Code: [email or web address]  URL: [email or web address]  Date: 05/08/2025  Prepared by: Lety Yost       See flowsheets for treatment detail.      History # of Personal Factors and/or Comorbidities: LOW (0)  Examination of Body System(s): # of elements: LOW (1-2)  Clinical Presentation: STABLE   Clinical Decision Making: LOW       Timed:         Manual Therapy:         mins  18961;     Therapeutic Exercise:    12     mins  67995;     Neuromuscular Bertin:        mins  28084;    Therapeutic Activity:          mins  87070;     Gait Training:           mins  02063;     Ionto                                   mins   29050  Self Care                       12     mins   89642      Un-Timed:  Electrical Stimulation:         mins  19148 ( );  Dry Needling          mins self-pay  Traction          mins 49194  Low Eval     15     Mins  87037  Mod Eval          Mins  35178  High Eval                            Mins  78985  Re-Eval                               mins  59065      Timed Treatment:   24   mins   Total Treatment:     50   mins    PT SIGNATURE: Lety Yost, PT   [place] PT license #: [id number]  DATE TREATMENT INITIATED: 5/8/2025    Initial Certification  Certification Period: 8/5/2025  I certify that the therapy services are furnished while this patient is under my care.  The services outlined above are required by this patient, and will be reviewed every 90 days.    PHYSICIAN: Devon Willingham APRN  NPI: [id number]                                      DATE:     Please sign and return via fax to [facility] - Fax #: [phone]. Thank you, [facility].

## 2025-05-30 RX ORDER — BENAZEPRIL HYDROCHLORIDE 20 MG/1
20 TABLET ORAL DAILY
Qty: 90 TABLET | Refills: 0 | Status: SHIPPED | OUTPATIENT
Start: 2025-05-30

## 2025-06-03 ENCOUNTER — EXTERNAL PBMM DATA (OUTPATIENT)
Dept: PHARMACY | Facility: OTHER | Age: 85
End: 2025-06-03
Payer: MEDICARE

## 2025-06-17 ENCOUNTER — POP HEALTH PHARMACY (OUTPATIENT)
Dept: PHARMACY | Facility: OTHER | Age: 85
End: 2025-06-17
Payer: MEDICARE

## 2025-06-17 NOTE — PROGRESS NOTES
[facility]      Navi Kinney was called today to discuss medication adherence with ATORVASTATIN CALCIUM ([facility] COA REDUCTASE INHIBITORS)  BENAZEPRIL HCL (ACE INHIBITORS) , as she was identified as having care opportunities.    Program Details    [facility]  Status: Enrolled  Effective Dates: 6/17/2025 - present  Responsible Staff: Nara Tucker LPN          Opportunities Identified    Adherence- Cholesterol  Adherence- Hypertension       Adherence and Medication Management    Adherence Questions   Patient Reported X Missed Doses in the Last 7 Days : 0  Reasons for Non-Adherence : No problems identified  Interested in a 90 day supply? : No (Pt. stated she will discuss this with her physician and pharmacist once she decides on a 30 or 90 day supply.)  Does this require clinical escalation to a pharmacist?: N         General Medication Management    Type of medication management: targeted medication review  Review Completed on: 6/17/25  Referred by: pharmacist  Provider: licensed practical nurse  Visit type: initial           Medication Therapy Problems     Medication Therapy Recommendations  No medication therapy recommendations to display      Summary    Medication Management Summary    Topics discussed: adherence and missed doses discussed, reminder to refill or  medication discussed  Time spent: 61 - 75 min         Nara Tucker LPN, 06/17/25, 3:19 PM EDT.

## 2025-07-08 ENCOUNTER — POP HEALTH PHARMACY (OUTPATIENT)
Dept: PHARMACY | Facility: OTHER | Age: 85
End: 2025-07-08
Payer: MEDICARE

## 2025-08-19 RX ORDER — COLESTIPOL HYDROCHLORIDE 1 G/1
2 TABLET ORAL DAILY
Qty: 180 TABLET | Refills: 0 | Status: SHIPPED | OUTPATIENT
Start: 2025-08-19

## 2025-08-19 RX ORDER — BENAZEPRIL HYDROCHLORIDE 20 MG/1
20 TABLET ORAL DAILY
Qty: 90 TABLET | Refills: 0 | Status: SHIPPED | OUTPATIENT
Start: 2025-08-19

## 2025-08-19 RX ORDER — DAPAGLIFLOZIN 10 MG/1
10 TABLET, FILM COATED ORAL DAILY
Qty: 90 TABLET | Refills: 2 | Status: SHIPPED | OUTPATIENT
Start: 2025-08-19

## 2025-08-19 RX ORDER — DULOXETIN HYDROCHLORIDE 60 MG/1
60 CAPSULE, DELAYED RELEASE ORAL EVERY MORNING
Qty: 90 CAPSULE | Refills: 2 | Status: SHIPPED | OUTPATIENT
Start: 2025-08-19

## (undated) DEVICE — SUT MNCRYL PLS ANTIB UD 4/0 PS2 18IN

## (undated) DEVICE — SUT VIC 0/0 UR6 27IN DYED J603H

## (undated) DEVICE — Device

## (undated) DEVICE — ENDOCUT SCISSOR TIP, DISPOSABLE: Brand: RENEW

## (undated) DEVICE — ENDOPATH XCEL BLADELESS TROCARS WITH STABILITY SLEEVES: Brand: ENDOPATH XCEL

## (undated) DEVICE — IRRIGATOR BULB ASEPTO 60CC STRL

## (undated) DEVICE — APPL CHLORAPREP HI/LITE 26ML ORNG

## (undated) DEVICE — SPNG GZ WOVN 4X4IN 12PLY 10/BX STRL

## (undated) DEVICE — SUT VIC 3/0 TIES 18IN J110T

## (undated) DEVICE — ECHELON FLEX45 ENDOPATH STAPLER, ARTICULATING ENDOSCOPIC LINEAR CUTTER (NO CARTRIDGE): Brand: ECHELON ENDOPATH

## (undated) DEVICE — SUT VIC 3/0 CTI 36IN J944H

## (undated) DEVICE — SUT PDS 0 CT1 36IN Z346H

## (undated) DEVICE — APPL HEMO SURG ARISTA/AH/FLEXITIP XL 38CM

## (undated) DEVICE — ELECTRD BLD EDGE/INSUL1P 2.4X5.1MM STRL

## (undated) DEVICE — LOU LAP CHOLE: Brand: MEDLINE INDUSTRIES, INC.

## (undated) DEVICE — TP CLTH MEDIPORE SFT 2IN 10YD

## (undated) DEVICE — GLV SURG BIOGEL LTX PF 7 1/2

## (undated) DEVICE — TROCAR SITE CLOSURE DEVICE: Brand: ENDO CLOSE

## (undated) DEVICE — GLV SURG BIOGEL LTX PF 6 1/2

## (undated) DEVICE — LAPAROSCOPIC SMOKE FILTRATION SYSTEM: Brand: PALL LAPAROSHIELD® PLUS LAPAROSCOPIC SMOKE FILTRATION SYSTEM

## (undated) DEVICE — LAPAROSCOPIC SMOKE ELIMINATION DEVICE: Brand: PNEUVIEW XE

## (undated) DEVICE — STPLR SKIN VISISTAT WD 35CT

## (undated) DEVICE — PREP SOL POVIDONE/IODINE BT 4OZ

## (undated) DEVICE — ADHS SKIN DERMABOND TOP ADVANCED

## (undated) DEVICE — ENDOPOUCH RETRIEVER SPECIMEN RETRIEVAL BAGS: Brand: ENDOPOUCH RETRIEVER

## (undated) DEVICE — ENDOPATH XCEL UNIVERSAL TROCAR STABLILITY SLEEVES: Brand: ENDOPATH XCEL

## (undated) DEVICE — SUT VIC 0 CT1 36IN J946H

## (undated) DEVICE — NDL HYPO PRECISIONGLIDE REG 25G 1 1/2

## (undated) DEVICE — SKIN AFFIX SURG ADHESIVE 72/CS 0.55ML: Brand: MEDLINE

## (undated) DEVICE — PK PROC MAJ 40

## (undated) DEVICE — SKIN PREP TRAY W/CHG: Brand: MEDLINE INDUSTRIES, INC.

## (undated) DEVICE — JACKSON-PRATT 100CC BULB RESERVOIR: Brand: CARDINAL HEALTH

## (undated) DEVICE — SUT VIC 2/0 TIES 18IN J111T

## (undated) DEVICE — VISUALIZATION SYSTEM: Brand: CLEARIFY

## (undated) DEVICE — TOTAL TRAY, 16FR 10ML SIL FOLEY, URN: Brand: MEDLINE

## (undated) DEVICE — ANTIBACTERIAL UNDYED BRAIDED (POLYGLACTIN 910), SYNTHETIC ABSORBABLE SUTURE: Brand: COATED VICRYL

## (undated) DEVICE — SYS PERFUS SEP PLATLT W TIPS CUST

## (undated) DEVICE — INTENDED FOR TISSUE SEPARATION, AND OTHER PROCEDURES THAT REQUIRE A SHARP SURGICAL BLADE TO PUNCTURE OR CUT.: Brand: BARD-PARKER ® CARBON RIB-BACK BLADES